# Patient Record
Sex: MALE | Race: WHITE | Employment: OTHER | URBAN - METROPOLITAN AREA
[De-identification: names, ages, dates, MRNs, and addresses within clinical notes are randomized per-mention and may not be internally consistent; named-entity substitution may affect disease eponyms.]

---

## 2018-01-08 PROBLEM — N40.1 BENIGN PROSTATIC HYPERPLASIA WITH WEAK URINARY STREAM: Chronic | Status: ACTIVE | Noted: 2018-01-08

## 2018-01-08 PROBLEM — R39.12 BENIGN PROSTATIC HYPERPLASIA WITH WEAK URINARY STREAM: Chronic | Status: ACTIVE | Noted: 2018-01-08

## 2018-01-08 PROBLEM — K40.90 NON-RECURRENT UNILATERAL INGUINAL HERNIA WITHOUT OBSTRUCTION OR GANGRENE: Chronic | Status: ACTIVE | Noted: 2018-01-08

## 2018-01-09 DIAGNOSIS — Z13.220 SCREENING FOR HYPERLIPIDEMIA: ICD-10-CM

## 2018-01-09 DIAGNOSIS — Z11.59 ENCOUNTER FOR HEPATITIS C SCREENING TEST FOR LOW RISK PATIENT: ICD-10-CM

## 2018-01-09 DIAGNOSIS — Z13.1 SCREENING FOR DIABETES MELLITUS: ICD-10-CM

## 2018-01-09 DIAGNOSIS — Z11.4 SCREENING FOR HIV (HUMAN IMMUNODEFICIENCY VIRUS): ICD-10-CM

## 2018-01-09 LAB
CHOLESTEROL, TOTAL: 192 MG/DL (ref 0–199)
GLUCOSE FASTING: 89 MG/DL (ref 74–109)
HDLC SERPL-MCNC: 39 MG/DL (ref 40–59)
HEPATITIS C ANTIBODY INTERPRETATION: NORMAL
LDL CHOLESTEROL CALCULATED: 140 MG/DL (ref 0–129)
TRIGL SERPL-MCNC: 66 MG/DL (ref 0–200)

## 2018-01-11 LAB — HIV-1 AND HIV-2 ANTIBODIES: NEGATIVE

## 2018-01-12 PROBLEM — E78.00 PURE HYPERCHOLESTEROLEMIA: Chronic | Status: ACTIVE | Noted: 2018-01-12

## 2018-01-18 ENCOUNTER — OFFICE VISIT (OUTPATIENT)
Dept: UROLOGY | Age: 58
End: 2018-01-18

## 2018-01-18 VITALS
WEIGHT: 205 LBS | DIASTOLIC BLOOD PRESSURE: 80 MMHG | BODY MASS INDEX: 27.17 KG/M2 | SYSTOLIC BLOOD PRESSURE: 130 MMHG | HEART RATE: 77 BPM | HEIGHT: 73 IN

## 2018-01-18 DIAGNOSIS — N40.1 BPH WITH OBSTRUCTION/LOWER URINARY TRACT SYMPTOMS: Primary | ICD-10-CM

## 2018-01-18 DIAGNOSIS — N40.1 BPH WITH OBSTRUCTION/LOWER URINARY TRACT SYMPTOMS: ICD-10-CM

## 2018-01-18 DIAGNOSIS — N13.8 BPH WITH OBSTRUCTION/LOWER URINARY TRACT SYMPTOMS: ICD-10-CM

## 2018-01-18 DIAGNOSIS — N13.8 BPH WITH OBSTRUCTION/LOWER URINARY TRACT SYMPTOMS: Primary | ICD-10-CM

## 2018-01-18 LAB — PROSTATE SPECIFIC ANTIGEN: 1.64 NG/ML (ref 0–5.4)

## 2018-01-18 PROCEDURE — 99243 OFF/OP CNSLTJ NEW/EST LOW 30: CPT | Performed by: UROLOGY

## 2018-01-18 ASSESSMENT — ENCOUNTER SYMPTOMS
RESPIRATORY NEGATIVE: 1
GASTROINTESTINAL NEGATIVE: 1
ALLERGIC/IMMUNOLOGIC NEGATIVE: 1

## 2018-01-18 NOTE — PROGRESS NOTES
Subjective:      Patient ID: Flaca Smith is a 59 y.o. male. HPI  This is a 60 yo male with no reported PMH sent for consultation by Dr Phuc Macias for voiding problems. He reports having slowly developed a decreased urinary stream with PVD. 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 He has no hematuria or dysuria. He has no urgency or UI. He feels the bladder empties and has no hesitancy. He has no intermittency. He has no prior UTI's and no abd or flank pain. He has no prior kidney stones. He has no prior Gu surgical history. He is not that bothered by his current voiding complaints. He quit cigg smoking in the 1980's. He has no family h/o  malignancies. He has no other complaints. He was unable to void for a U/A today. Past Medical History:   Diagnosis Date    Pure hypercholesterolemia 1/12/2018     History reviewed. No pertinent surgical history. Social History     Social History    Marital status: Single     Spouse name: N/A    Number of children: N/A    Years of education: N/A     Social History Main Topics    Smoking status: Former Smoker     Quit date: 1/8/1988    Smokeless tobacco: Never Used    Alcohol use No    Drug use: No    Sexual activity: Not Asked     Other Topics Concern    None     Social History Narrative    None     Family History   Problem Relation Age of Onset    Cancer Mother     No Known Problems Brother     Other Sister     Cancer Sister     No Known Problems Sister     No Known Problems Brother      Current Outpatient Prescriptions   Medication Sig Dispense Refill    Multiple Vitamins-Minerals (THERAPEUTIC MULTIVITAMIN-MINERALS) tablet Take 1 tablet by mouth daily      Multiple Vitamins-Minerals (EYE VITAMINS PO) Take by mouth      COD LIVER OIL PO Take by mouth       No current facility-administered medications for this visit. Review of patient's allergies indicates no known allergies.   reviewed      Review of 11:49 AM - Marissa Mohr     Component Results     Component Collected Lab   Color, UA 01/08/2018 11:47 AM Unknown   yellow    Clarity, UA 01/08/2018 11:47 AM Unknown   clear    Glucose, UA POC 01/08/2018 11:47 AM Unknown   neg    Bilirubin, UA 01/08/2018 11:47 AM Unknown   neg    Ketones, UA 01/08/2018 11:47 AM Unknown   neg    Spec Grav, UA 01/08/2018 11:47 AM Unknown   1.025    Blood, UA POC 01/08/2018 11:47 AM Unknown   trace    Comment:   non-hemolyzed   pH, UA 01/08/2018 11:47 AM Unknown   5.0    Protein, UA POC 01/08/2018 11:47 AM Unknown   trace    Urobilinogen, UA 01/08/2018 11:47 AM Unknown   neg    Leukocytes, UA 01/08/2018 11:47 AM Unknown   neg    Nitrite, UA 01/08/2018 11:47 AM Unknown   neg    Lab and Collection         Assessment: This is a 60 yo male with BPH by exam and mild LUTs. I recommend a Flow/PVR in follow-up and U/A. I also recommend a PSA. The purpose of which was dicussed today. Will discuss treatment option for his BPH in follow-up. He may decide against any treatment given his symptoms are not that bothersome which is reasonable. Plan:      1. F/U 1-2 weeks for Flow/PVR and PSA and U/A  2.  See Dr Brenda Goldberg on same day for Rt inguinal hernia

## 2018-02-01 ENCOUNTER — OFFICE VISIT (OUTPATIENT)
Dept: UROLOGY | Age: 58
End: 2018-02-01
Payer: COMMERCIAL

## 2018-02-01 ENCOUNTER — OFFICE VISIT (OUTPATIENT)
Dept: SURGERY | Age: 58
End: 2018-02-01
Payer: COMMERCIAL

## 2018-02-01 ENCOUNTER — PREP FOR PROCEDURE (OUTPATIENT)
Dept: SURGERY | Age: 58
End: 2018-02-01

## 2018-02-01 VITALS
HEIGHT: 73 IN | SYSTOLIC BLOOD PRESSURE: 130 MMHG | WEIGHT: 209 LBS | BODY MASS INDEX: 27.7 KG/M2 | DIASTOLIC BLOOD PRESSURE: 84 MMHG | HEART RATE: 69 BPM

## 2018-02-01 VITALS
TEMPERATURE: 97.3 F | WEIGHT: 209 LBS | BODY MASS INDEX: 27.7 KG/M2 | HEIGHT: 73 IN | SYSTOLIC BLOOD PRESSURE: 130 MMHG | DIASTOLIC BLOOD PRESSURE: 84 MMHG

## 2018-02-01 DIAGNOSIS — N40.1 BPH WITH OBSTRUCTION/LOWER URINARY TRACT SYMPTOMS: ICD-10-CM

## 2018-02-01 DIAGNOSIS — R33.9 INCOMPLETE EMPTYING OF BLADDER: Primary | ICD-10-CM

## 2018-02-01 DIAGNOSIS — N13.8 BPH WITH OBSTRUCTION/LOWER URINARY TRACT SYMPTOMS: ICD-10-CM

## 2018-02-01 DIAGNOSIS — K40.90 RIGHT INGUINAL HERNIA: Primary | ICD-10-CM

## 2018-02-01 LAB
BILIRUBIN, POC: NORMAL
BLOOD URINE, POC: NORMAL
CLARITY, POC: CLEAR
COLOR, POC: YELLOW
GLUCOSE URINE, POC: NORMAL
KETONES, POC: NORMAL
LEUKOCYTE EST, POC: NORMAL
NITRITE, POC: NORMAL
PH, POC: 6
PROTEIN, POC: NORMAL
SPECIFIC GRAVITY, POC: <1.005
UROBILINOGEN, POC: 0.2

## 2018-02-01 PROCEDURE — 81003 URINALYSIS AUTO W/O SCOPE: CPT | Performed by: UROLOGY

## 2018-02-01 PROCEDURE — 99214 OFFICE O/P EST MOD 30 MIN: CPT | Performed by: UROLOGY

## 2018-02-01 PROCEDURE — 99203 OFFICE O/P NEW LOW 30 MIN: CPT | Performed by: SURGERY

## 2018-02-01 PROCEDURE — 99999 PR ELECTRO-UROFLOWMETRY, FIRST: CPT | Performed by: UROLOGY

## 2018-02-01 PROCEDURE — 51798 US URINE CAPACITY MEASURE: CPT | Performed by: UROLOGY

## 2018-02-01 RX ORDER — TAMSULOSIN HYDROCHLORIDE 0.4 MG/1
0.4 CAPSULE ORAL DAILY
Qty: 90 CAPSULE | Refills: 3 | Status: SHIPPED | OUTPATIENT
Start: 2018-02-01 | End: 2020-01-31

## 2018-02-01 ASSESSMENT — ENCOUNTER SYMPTOMS
ABDOMINAL PAIN: 0
COLOR CHANGE: 0
ABDOMINAL PAIN: 0
CHEST TIGHTNESS: 0
SHORTNESS OF BREATH: 0
SHORTNESS OF BREATH: 0
CONSTIPATION: 0
EYES NEGATIVE: 1
ABDOMINAL DISTENTION: 0
RHINORRHEA: 0
ABDOMINAL DISTENTION: 0
ALLERGIC/IMMUNOLOGIC NEGATIVE: 1
BLOOD IN STOOL: 0

## 2018-02-01 NOTE — PROGRESS NOTES
allergies indicates no known allergies. No Known Allergies  Current Outpatient Prescriptions   Medication Sig Dispense Refill    Multiple Vitamins-Minerals (THERAPEUTIC MULTIVITAMIN-MINERALS) tablet Take 1 tablet by mouth daily      Multiple Vitamins-Minerals (EYE VITAMINS PO) Take by mouth      COD LIVER OIL PO Take by mouth      tamsulosin (FLOMAX) 0.4 MG capsule Take 1 capsule by mouth daily 90 capsule 3     No current facility-administered medications for this visit. /84   Temp 97.3 °F (36.3 °C) (Temporal)   Ht 6' 1\" (1.854 m)   Wt 209 lb (94.8 kg)   BMI 27.57 kg/m²     Objective:   Physical Exam   Constitutional: He is oriented to person, place, and time. He appears well-developed and well-nourished. No distress. HENT:   Mouth/Throat: Oropharynx is clear and moist.   Eyes: Pupils are equal, round, and reactive to light. Cardiovascular: Normal rate and normal heart sounds. Pulmonary/Chest: Effort normal and breath sounds normal. No respiratory distress. Abdominal: There is no hepatosplenomegaly. There is no tenderness. A hernia is present. Hernia confirmed positive in the right inguinal area (large reducible RIH). Hernia confirmed negative in the left inguinal area. Musculoskeletal:   Normal gait   Neurological: He is alert and oriented to person, place, and time. Skin: No bruising, no lesion and no rash noted. Psychiatric: He has a normal mood and affect. Judgment normal.       Assessment:      Reducible right inguinal hernia      Plan:      Right Inguinal hernia repair    The risks, benefits and indications for repair of the inguinal hernia are reviewed with the patient. The potential risks and complications including but not exclusive to bleeding, infection, nerve damage, testicular damage, chronic pain and recurrence were reviewed. Anticipated convalescence is discussed. The probable use of prosthetic materials/ mesh is reviewed.   All questions are answered and the patient elects to proceed with surgical repair.

## 2018-02-07 ENCOUNTER — TELEPHONE (OUTPATIENT)
Dept: SURGERY | Age: 58
End: 2018-02-07

## 2018-02-20 ENCOUNTER — OFFICE VISIT (OUTPATIENT)
Dept: UROLOGY | Age: 58
End: 2018-02-20
Payer: COMMERCIAL

## 2018-02-20 VITALS
SYSTOLIC BLOOD PRESSURE: 132 MMHG | BODY MASS INDEX: 27.17 KG/M2 | HEART RATE: 74 BPM | HEIGHT: 73 IN | WEIGHT: 205 LBS | DIASTOLIC BLOOD PRESSURE: 86 MMHG

## 2018-02-20 DIAGNOSIS — N13.8 BPH WITH OBSTRUCTION/LOWER URINARY TRACT SYMPTOMS: Primary | ICD-10-CM

## 2018-02-20 DIAGNOSIS — N40.1 BPH WITH OBSTRUCTION/LOWER URINARY TRACT SYMPTOMS: Primary | ICD-10-CM

## 2018-02-20 PROCEDURE — 99212 OFFICE O/P EST SF 10 MIN: CPT | Performed by: UROLOGY

## 2018-02-20 PROCEDURE — 51798 US URINE CAPACITY MEASURE: CPT | Performed by: UROLOGY

## 2018-02-20 PROCEDURE — 51741 ELECTRO-UROFLOWMETRY FIRST: CPT | Performed by: UROLOGY

## 2018-02-20 ASSESSMENT — ENCOUNTER SYMPTOMS
ABDOMINAL PAIN: 0
ABDOMINAL DISTENTION: 0

## 2018-04-13 DIAGNOSIS — E78.00 PURE HYPERCHOLESTEROLEMIA: Chronic | ICD-10-CM

## 2018-04-13 LAB
CHOLESTEROL, FASTING: 180 MG/DL (ref 0–199)
HDLC SERPL-MCNC: 42 MG/DL (ref 40–59)
LDL CHOLESTEROL CALCULATED: 123 MG/DL (ref 0–129)
TRIGLYCERIDE, FASTING: 73 MG/DL (ref 0–200)

## 2018-04-17 ENCOUNTER — OFFICE VISIT (OUTPATIENT)
Dept: FAMILY MEDICINE CLINIC | Age: 58
End: 2018-04-17
Payer: COMMERCIAL

## 2018-04-17 VITALS
SYSTOLIC BLOOD PRESSURE: 128 MMHG | DIASTOLIC BLOOD PRESSURE: 92 MMHG | WEIGHT: 214.38 LBS | BODY MASS INDEX: 28.28 KG/M2 | OXYGEN SATURATION: 96 % | HEART RATE: 85 BPM | TEMPERATURE: 97.8 F

## 2018-04-17 DIAGNOSIS — R03.0 ELEVATED BLOOD PRESSURE READING: Chronic | ICD-10-CM

## 2018-04-17 DIAGNOSIS — E78.00 PURE HYPERCHOLESTEROLEMIA: Primary | Chronic | ICD-10-CM

## 2018-04-17 DIAGNOSIS — B35.4 TINEA CORPORIS: ICD-10-CM

## 2018-04-17 PROCEDURE — 99213 OFFICE O/P EST LOW 20 MIN: CPT | Performed by: FAMILY MEDICINE

## 2018-04-17 ASSESSMENT — PATIENT HEALTH QUESTIONNAIRE - PHQ9
1. LITTLE INTEREST OR PLEASURE IN DOING THINGS: 0
SUM OF ALL RESPONSES TO PHQ9 QUESTIONS 1 & 2: 0
2. FEELING DOWN, DEPRESSED OR HOPELESS: 0
SUM OF ALL RESPONSES TO PHQ QUESTIONS 1-9: 0

## 2018-04-18 PROBLEM — R03.0 ELEVATED BLOOD PRESSURE READING: Chronic | Status: ACTIVE | Noted: 2018-04-18

## 2018-04-18 RX ORDER — CLOTRIMAZOLE AND BETAMETHASONE DIPROPIONATE 10; .64 MG/G; MG/G
CREAM TOPICAL
Qty: 45 G | Refills: 0 | Status: SHIPPED | OUTPATIENT
Start: 2018-04-18 | End: 2020-01-31

## 2020-01-31 ENCOUNTER — OFFICE VISIT (OUTPATIENT)
Dept: FAMILY MEDICINE CLINIC | Age: 60
End: 2020-01-31

## 2020-01-31 VITALS
TEMPERATURE: 97.1 F | OXYGEN SATURATION: 98 % | HEIGHT: 72 IN | SYSTOLIC BLOOD PRESSURE: 146 MMHG | WEIGHT: 209 LBS | HEART RATE: 66 BPM | BODY MASS INDEX: 28.31 KG/M2 | RESPIRATION RATE: 16 BRPM | DIASTOLIC BLOOD PRESSURE: 82 MMHG

## 2020-01-31 PROBLEM — I10 ESSENTIAL HYPERTENSION: Chronic | Status: ACTIVE | Noted: 2018-04-18

## 2020-01-31 PROCEDURE — 99213 OFFICE O/P EST LOW 20 MIN: CPT | Performed by: FAMILY MEDICINE

## 2020-01-31 RX ORDER — LISINOPRIL 20 MG/1
20 TABLET ORAL DAILY
Qty: 90 TABLET | Refills: 4 | Status: SHIPPED | OUTPATIENT
Start: 2020-01-31 | End: 2021-01-01 | Stop reason: SDUPTHER

## 2020-01-31 RX ORDER — LISINOPRIL 20 MG/1
20 TABLET ORAL DAILY
Qty: 90 TABLET | Refills: 4 | Status: SHIPPED | OUTPATIENT
Start: 2020-01-31 | End: 2020-01-31 | Stop reason: SDUPTHER

## 2020-01-31 ASSESSMENT — PATIENT HEALTH QUESTIONNAIRE - PHQ9
SUM OF ALL RESPONSES TO PHQ9 QUESTIONS 1 & 2: 0
1. LITTLE INTEREST OR PLEASURE IN DOING THINGS: 0
2. FEELING DOWN, DEPRESSED OR HOPELESS: 0
SUM OF ALL RESPONSES TO PHQ QUESTIONS 1-9: 0
SUM OF ALL RESPONSES TO PHQ QUESTIONS 1-9: 0
DEPRESSION UNABLE TO ASSESS: PT REFUSES

## 2020-01-31 NOTE — PROGRESS NOTES
Subjective  Adam Sarah, 62 y.o. male presents today with:  Chief Complaint   Patient presents with    Hypertension     Patient present today to check his BP. 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 Just want to make sure his Bp is ok.  Health Maintenance     Patient refuse all. HPI    Patient is here for f/u HTN. 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 Avoids added salt. Tries to eat healthy. Exercises regularly. Has no chest pain, shortness of breath, palpitations or edema. No other questions and or concerns for today's visit      Review of Systems   No fevers, chills, sweats. No unintended weight loss. No abdominal pain, nausea, vomiting, diarrhea, constipation, bloody stools, black tarry stools. No rashes. No swollen glands. Past Medical History:   Diagnosis Date    Pure hypercholesterolemia 2018     History reviewed. No pertinent surgical history.   Social History     Socioeconomic History    Marital status: Single     Spouse name: Not on file    Number of children: Not on file    Years of education: Not on file    Highest education level: Not on file   Occupational History    Not on file   Social Needs    Financial resource strain: Not on file    Food insecurity:     Worry: Not on file     Inability: Not on file    Transportation needs:     Medical: Not on file     Non-medical: Not on file   Tobacco Use    Smoking status: Former Smoker     Packs/day: 1.00     Years: 10.00     Pack years: 10.00     Types: Cigarettes     Start date: 1979     Last attempt to quit: 1988     Years since quittin.0    Smokeless tobacco: Never Used   Substance and Sexual Activity    Alcohol use: No    Drug use: No    Sexual activity: Not on file   Lifestyle    Physical activity:     Days per week: Not on file     Minutes per session: Not on file    Stress: Not on file Relationships    Social connections:     Talks on phone: Not on file     Gets together: Not on file     Attends [employer] service: Not on file     Active member of club or organization: Not on file     Attends meetings of clubs or organizations: Not on file     Relationship status: Not on file    Intimate partner violence:     Fear of current or ex partner: Not on file     Emotionally abused: Not on file     Physically abused: Not on file     Forced sexual activity: Not on file   Other Topics Concern    Not on file   Social History Narrative    Not on file     Family History   Problem Relation Age of Onset    Cancer Mother     No Known Problems Brother     Other Sister     Cancer Sister     No Known Problems Sister     No Known Problems Brother      No Known Allergies  Current Outpatient Medications   Medication Sig Dispense Refill    lisinopril (PRINIVIL;ZESTRIL) 20 MG tablet Take 1 tablet by mouth daily 90 tablet 4     No current facility-administered medications for this visit. PMH, Surgical Hx, Family Hx, and Social Hxreviewed and updated. Health Maintenance reviewed. Objective    Vitals:    01/31/20 0807   BP: (!) 146/82   Site: Left Upper Arm   Position: Sitting   Cuff Size: Medium Adult   Pulse: 66   Resp: 16   Temp: 97.1 °F (36.2 °C)   TempSrc: Tympanic   SpO2: 98%   Weight: 209 lb (94.8 kg)   Height: 6' (1.829 m)        Physical Exam  Constitutional:       Appearance: He is well-developed. HENT:      Head: Normocephalic and atraumatic. Eyes:      General: No scleral icterus. Conjunctiva/sclera: Conjunctivae normal.   Neck:      Musculoskeletal: Normal range of motion and neck supple. Thyroid: No thyromegaly. Vascular: No carotid bruit. Cardiovascular:      Rate and Rhythm: Normal rate and regular rhythm. Heart sounds: Normal heart sounds, S1 normal and S2 normal.   Pulmonary:      Effort: Pulmonary effort is normal.      Breath sounds: Normal breath sounds.  No wheezing or rales. Abdominal:      General: Bowel sounds are normal. There is no distension. Palpations: Abdomen is soft. There is no mass. Tenderness: There is no abdominal tenderness. There is no guarding or rebound. Skin:     General: Skin is warm and dry. Neurological:      Mental Status: He is alert and oriented to person, place, and time. No results found for: LABA1C  No results found for: LABMICR, CREATININE  No results found for: ALT, AST  Lab Results   Component Value Date    CHOL 192 01/09/2018    TRIG 66 01/09/2018    HDL 42 04/13/2018    LDLCALC 123 04/13/2018        Assessment & Plan   Visit Diagnoses and Associated Orders     Essential hypertension    -  Primary    Basic Metabolic Panel [91130 Custom]   - Future Order    Lipid, Fasting [39763 Custom]   - Future Order    lisinopril (PRINIVIL;ZESTRIL) 20 MG tablet [[id number]]               Reviewed with the patient: all disease processes, current clinical status, medications, activities and diet.      Side effects, adverse effects of the medication prescribed today, as well as treatment plan/ rationale and result expectations have been discussed with the patient who expresses understanding and desires to proceed.     Close follow up to evaluate treatment results and for coordination of care. I have reviewed the patient's medical history in detail and updated the computerized patient record. More than 50% of the appointment was spent in face-to-face counseling, education and care coordination.       Orders Placed This Encounter   Procedures    Basic Metabolic Panel     Standing Status:   Future     Standing Expiration Date:   1/31/2021    Lipid, Fasting     Standing Status:   Future     Standing Expiration Date:   1/31/2021     Orders Placed This Encounter   Medications    lisinopril (PRINIVIL;ZESTRIL) 20 MG tablet     Sig: Take 1 tablet by mouth daily     Dispense:  90 tablet     Refill:  4     Medications Discontinued During

## 2020-01-31 NOTE — PATIENT INSTRUCTIONS
are labeled \"unsalted,\" \"sodium-free,\" or \"low-sodium. \" Foods labeled \"reduced-sodium\" and \"light sodium\" may still have too much sodium. ? Flavor your food with garlic, lemon juice, onion, vinegar, herbs, and spices instead of salt. Do not use soy sauce, steak sauce, onion salt, garlic salt, mustard, or ketchup on your food. ? Use less salt (or none) when recipes call for it. You can often use half the salt a recipe calls for without losing flavor. · Be physically active. Get at least 30 minutes of exercise on most days of the week. Walking is a good choice. You also may want to do other activities, such as running, swimming, cycling, or playing tennis or team sports. · Limit alcohol to 2 drinks a day for men and 1 drink a day for women. · Eat plenty of fruits, vegetables, and low-fat dairy products. Eat less saturated and total fats. How is high blood pressure treated? · Your doctor will suggest making lifestyle changes to help your heart. For example, your doctor may ask you to eat healthy foods, quit smoking, lose extra weight, and be more active. · If lifestyle changes don't help enough, your doctor may recommend that you take medicine. · When blood pressure is very high, medicines are needed to lower it. Follow-up care is a key part of your treatment and safety. Be sure to make and go to all appointments, and call your doctor if you are having problems. It's also a good idea to know your test results and keep a list of the medicines you take. Where can you learn more? Go to [email or web address] and sign in to your [employer] account. Enter P501 in the [facility] box to learn more about \"Learning About High Blood Pressure. \"     If you do not have an account, please click on the \"Sign Up Now\" link. Current as of: April 9, 2019  Content Version: 12.3  © 0581-2966 [employer]. Care instructions adapted under license by [facility] ([facility]).  If you have questions

## 2020-03-13 ENCOUNTER — OFFICE VISIT (OUTPATIENT)
Dept: FAMILY MEDICINE CLINIC | Age: 60
End: 2020-03-13

## 2020-03-13 VITALS
SYSTOLIC BLOOD PRESSURE: 128 MMHG | HEIGHT: 72 IN | RESPIRATION RATE: 16 BRPM | BODY MASS INDEX: 28.17 KG/M2 | TEMPERATURE: 96.7 F | WEIGHT: 208 LBS | HEART RATE: 79 BPM | OXYGEN SATURATION: 99 % | DIASTOLIC BLOOD PRESSURE: 62 MMHG

## 2020-03-13 PROCEDURE — 99213 OFFICE O/P EST LOW 20 MIN: CPT | Performed by: FAMILY MEDICINE

## 2020-03-13 NOTE — PROGRESS NOTES
organization: Not on file     Attends meetings of clubs or organizations: Not on file     Relationship status: Not on file    Intimate partner violence     Fear of current or ex partner: Not on file     Emotionally abused: Not on file     Physically abused: Not on file     Forced sexual activity: Not on file   Other Topics Concern    Not on file   Social History Narrative    Not on file     Family History   Problem Relation Age of Onset    Cancer Mother     No Known Problems Brother     Other Sister     Cancer Sister     No Known Problems Sister     No Known Problems Brother      No Known Allergies  Current Outpatient Medications   Medication Sig Dispense Refill    lisinopril (PRINIVIL;ZESTRIL) 20 MG tablet Take 1 tablet by mouth daily 90 tablet 4     No current facility-administered medications for this visit. PMH, Surgical Hx, Family Hx, and Social Hxreviewed and updated. Health Maintenance reviewed. Objective    Vitals:    03/13/20 0750   BP: 128/62   Site: Left Upper Arm   Position: Sitting   Cuff Size: Medium Adult   Pulse: 79   Resp: 16   Temp: 96.7 °F (35.9 °C)   TempSrc: Temporal   SpO2: 99%   Weight: 208 lb (94.3 kg)   Height: 6' (1.829 m)        Physical Exam  Constitutional:       Appearance: He is well-developed. HENT:      Head: Normocephalic and atraumatic. Eyes:      Conjunctiva/sclera: Conjunctivae normal.   Neck:      Musculoskeletal: Normal range of motion and neck supple. Thyroid: No thyromegaly. Vascular: No carotid bruit. Cardiovascular:      Rate and Rhythm: Normal rate and regular rhythm. Heart sounds: S1 normal and S2 normal.   Pulmonary:      Effort: Pulmonary effort is normal.      Breath sounds: No wheezing or rales. Musculoskeletal:      Right lower leg: No edema. Left lower leg: No edema. Skin:     General: Skin is warm and dry. Neurological:      Mental Status: He is alert and oriented to person, place, and time.            Lab Results

## 2021-01-01 ENCOUNTER — TELEPHONE (OUTPATIENT)
Dept: PALLATIVE CARE | Age: 61
End: 2021-01-01

## 2021-01-01 ENCOUNTER — HOSPITAL ENCOUNTER (INPATIENT)
Age: 61
LOS: 2 days | Discharge: HOME | DRG: 134 | End: 2021-12-12
Attending: EMERGENCY MEDICINE | Admitting: INTERNAL MEDICINE
Payer: COMMERCIAL

## 2021-01-01 ENCOUNTER — OFFICE VISIT (OUTPATIENT)
Dept: FAMILY MEDICINE CLINIC | Age: 61
End: 2021-01-01

## 2021-01-01 ENCOUNTER — TELEPHONE (OUTPATIENT)
Dept: CARDIOLOGY CLINIC | Age: 61
End: 2021-01-01

## 2021-01-01 ENCOUNTER — HOSPITAL ENCOUNTER (INPATIENT)
Age: 61
LOS: 1 days | DRG: 139 | End: 2021-12-30
Attending: INTERNAL MEDICINE | Admitting: INTERNAL MEDICINE
Payer: COMMERCIAL

## 2021-01-01 ENCOUNTER — HOSPITAL ENCOUNTER (OUTPATIENT)
Dept: ULTRASOUND IMAGING | Age: 61
Discharge: HOME | End: 2021-11-07
Payer: COMMERCIAL

## 2021-01-01 ENCOUNTER — CARE COORDINATION (OUTPATIENT)
Dept: CARE COORDINATION | Age: 61
End: 2021-01-01

## 2021-01-01 ENCOUNTER — OFFICE VISIT (OUTPATIENT)
Dept: PALLATIVE CARE | Age: 61
End: 2021-01-01
Payer: COMMERCIAL

## 2021-01-01 ENCOUNTER — TELEPHONE (OUTPATIENT)
Dept: FAMILY MEDICINE CLINIC | Age: 61
End: 2021-01-01

## 2021-01-01 ENCOUNTER — HOSPITAL ENCOUNTER (OUTPATIENT)
Dept: CT IMAGING | Age: 61
Discharge: HOME | End: 2021-10-04
Payer: COMMERCIAL

## 2021-01-01 ENCOUNTER — HOSPITAL ENCOUNTER (EMERGENCY)
Age: 61
Discharge: HOME | End: 2021-10-30
Attending: EMERGENCY MEDICINE
Payer: COMMERCIAL

## 2021-01-01 ENCOUNTER — APPOINTMENT (OUTPATIENT)
Dept: ULTRASOUND IMAGING | Age: 61
DRG: 139 | End: 2021-01-01
Payer: COMMERCIAL

## 2021-01-01 ENCOUNTER — TELEPHONE (OUTPATIENT)
Dept: OTHER | Facility: CLINIC | Age: 61
End: 2021-01-01

## 2021-01-01 ENCOUNTER — APPOINTMENT (OUTPATIENT)
Dept: CT IMAGING | Age: 61
DRG: 139 | End: 2021-01-01
Payer: COMMERCIAL

## 2021-01-01 ENCOUNTER — TELEPHONE (OUTPATIENT)
Dept: INTERVENTIONAL RADIOLOGY/VASCULAR | Age: 61
End: 2021-01-01

## 2021-01-01 ENCOUNTER — APPOINTMENT (OUTPATIENT)
Dept: CT IMAGING | Age: 61
End: 2021-01-01
Payer: COMMERCIAL

## 2021-01-01 ENCOUNTER — APPOINTMENT (OUTPATIENT)
Dept: CT IMAGING | Age: 61
DRG: 137 | End: 2021-01-01
Payer: COMMERCIAL

## 2021-01-01 ENCOUNTER — VIRTUAL VISIT (OUTPATIENT)
Dept: PALLATIVE CARE | Age: 61
End: 2021-01-01
Payer: COMMERCIAL

## 2021-01-01 ENCOUNTER — HOSPITAL ENCOUNTER (OUTPATIENT)
Dept: GENERAL RADIOLOGY | Age: 61
Discharge: HOME | End: 2021-10-04
Payer: COMMERCIAL

## 2021-01-01 ENCOUNTER — PREP FOR PROCEDURE (OUTPATIENT)
Dept: INTERVENTIONAL RADIOLOGY/VASCULAR | Age: 61
End: 2021-01-01

## 2021-01-01 ENCOUNTER — HOSPITAL ENCOUNTER (EMERGENCY)
Age: 61
Discharge: HOME | End: 2021-12-16
Attending: EMERGENCY MEDICINE
Payer: COMMERCIAL

## 2021-01-01 ENCOUNTER — VIRTUAL VISIT (OUTPATIENT)
Dept: INTERVENTIONAL RADIOLOGY/VASCULAR | Age: 61
End: 2021-01-01
Payer: COMMERCIAL

## 2021-01-01 ENCOUNTER — HOSPITAL ENCOUNTER (INPATIENT)
Age: 61
LOS: 2 days | Discharge: HOME | DRG: 137 | End: 2021-11-18
Attending: INTERNAL MEDICINE | Admitting: INTERNAL MEDICINE
Payer: COMMERCIAL

## 2021-01-01 ENCOUNTER — APPOINTMENT (OUTPATIENT)
Dept: GENERAL RADIOLOGY | Age: 61
DRG: 139 | End: 2021-01-01
Payer: COMMERCIAL

## 2021-01-01 ENCOUNTER — APPOINTMENT (OUTPATIENT)
Dept: CT IMAGING | Age: 61
DRG: 134 | End: 2021-01-01
Payer: COMMERCIAL

## 2021-01-01 ENCOUNTER — APPOINTMENT (OUTPATIENT)
Dept: ULTRASOUND IMAGING | Age: 61
DRG: 134 | End: 2021-01-01
Payer: COMMERCIAL

## 2021-01-01 ENCOUNTER — HOSPITAL ENCOUNTER (EMERGENCY)
Age: 61
Discharge: HOME | End: 2021-11-05
Payer: COMMERCIAL

## 2021-01-01 VITALS
DIASTOLIC BLOOD PRESSURE: 84 MMHG | WEIGHT: 150 LBS | TEMPERATURE: 98 F | OXYGEN SATURATION: 92 % | BODY MASS INDEX: 21.47 KG/M2 | SYSTOLIC BLOOD PRESSURE: 125 MMHG | RESPIRATION RATE: 19 BRPM | HEART RATE: 125 BPM | HEIGHT: 70 IN

## 2021-01-01 VITALS
SYSTOLIC BLOOD PRESSURE: 91 MMHG | HEART RATE: 134 BPM | TEMPERATURE: 98.2 F | DIASTOLIC BLOOD PRESSURE: 68 MMHG | RESPIRATION RATE: 32 BRPM | OXYGEN SATURATION: 94 % | WEIGHT: 150 LBS | BODY MASS INDEX: 22.73 KG/M2 | HEIGHT: 68 IN

## 2021-01-01 VITALS
HEIGHT: 70 IN | WEIGHT: 170 LBS | DIASTOLIC BLOOD PRESSURE: 79 MMHG | OXYGEN SATURATION: 98 % | SYSTOLIC BLOOD PRESSURE: 117 MMHG | HEART RATE: 97 BPM | TEMPERATURE: 98.2 F | BODY MASS INDEX: 24.34 KG/M2 | RESPIRATION RATE: 18 BRPM

## 2021-01-01 VITALS
OXYGEN SATURATION: 94 % | BODY MASS INDEX: 22.03 KG/M2 | HEIGHT: 70 IN | WEIGHT: 153.88 LBS | DIASTOLIC BLOOD PRESSURE: 89 MMHG | SYSTOLIC BLOOD PRESSURE: 133 MMHG | RESPIRATION RATE: 20 BRPM | TEMPERATURE: 97.3 F | HEART RATE: 118 BPM

## 2021-01-01 VITALS
DIASTOLIC BLOOD PRESSURE: 66 MMHG | SYSTOLIC BLOOD PRESSURE: 109 MMHG | TEMPERATURE: 98.5 F | RESPIRATION RATE: 18 BRPM | OXYGEN SATURATION: 96 % | HEART RATE: 94 BPM

## 2021-01-01 VITALS
HEART RATE: 106 BPM | TEMPERATURE: 97.5 F | SYSTOLIC BLOOD PRESSURE: 119 MMHG | OXYGEN SATURATION: 96 % | RESPIRATION RATE: 18 BRPM | HEIGHT: 70 IN | WEIGHT: 171 LBS | BODY MASS INDEX: 24.48 KG/M2 | DIASTOLIC BLOOD PRESSURE: 73 MMHG

## 2021-01-01 VITALS
TEMPERATURE: 97.2 F | HEART RATE: 97 BPM | WEIGHT: 188.6 LBS | DIASTOLIC BLOOD PRESSURE: 80 MMHG | OXYGEN SATURATION: 96 % | BODY MASS INDEX: 25.55 KG/M2 | HEIGHT: 72 IN | RESPIRATION RATE: 16 BRPM | SYSTOLIC BLOOD PRESSURE: 112 MMHG

## 2021-01-01 VITALS
HEART RATE: 86 BPM | DIASTOLIC BLOOD PRESSURE: 78 MMHG | OXYGEN SATURATION: 98 % | RESPIRATION RATE: 20 BRPM | SYSTOLIC BLOOD PRESSURE: 114 MMHG

## 2021-01-01 VITALS — SYSTOLIC BLOOD PRESSURE: 144 MMHG | RESPIRATION RATE: 16 BRPM | DIASTOLIC BLOOD PRESSURE: 85 MMHG | HEART RATE: 118 BPM

## 2021-01-01 DIAGNOSIS — G89.3 CANCER RELATED PAIN: ICD-10-CM

## 2021-01-01 DIAGNOSIS — R53.1 WEAKNESS: ICD-10-CM

## 2021-01-01 DIAGNOSIS — G89.29 CHRONIC BILATERAL LOW BACK PAIN WITHOUT SCIATICA: Chronic | ICD-10-CM

## 2021-01-01 DIAGNOSIS — I10 ESSENTIAL HYPERTENSION: Chronic | ICD-10-CM

## 2021-01-01 DIAGNOSIS — K81.0 ACUTE CHOLECYSTITIS: ICD-10-CM

## 2021-01-01 DIAGNOSIS — Z51.5 ENCOUNTER FOR PALLIATIVE CARE: ICD-10-CM

## 2021-01-01 DIAGNOSIS — R53.1 WEAKNESS: Primary | ICD-10-CM

## 2021-01-01 DIAGNOSIS — G47.00 INSOMNIA, UNSPECIFIED TYPE: ICD-10-CM

## 2021-01-01 DIAGNOSIS — I26.94 MULTIPLE SUBSEGMENTAL PULMONARY EMBOLI WITHOUT ACUTE COR PULMONALE (HCC): ICD-10-CM

## 2021-01-01 DIAGNOSIS — M89.9 BONE LESION: ICD-10-CM

## 2021-01-01 DIAGNOSIS — E87.20 LACTIC ACIDOSIS: ICD-10-CM

## 2021-01-01 DIAGNOSIS — R53.81 DEBILITY: ICD-10-CM

## 2021-01-01 DIAGNOSIS — I46.9 CARDIOPULMONARY ARREST (HCC): ICD-10-CM

## 2021-01-01 DIAGNOSIS — R93.5 ABNORMAL ABDOMINAL CT SCAN: ICD-10-CM

## 2021-01-01 DIAGNOSIS — K76.9 LESION OF LIVER: ICD-10-CM

## 2021-01-01 DIAGNOSIS — Z12.5 SCREENING FOR PROSTATE CANCER: ICD-10-CM

## 2021-01-01 DIAGNOSIS — R10.0 ACUTE ABDOMEN: ICD-10-CM

## 2021-01-01 DIAGNOSIS — K40.90 NON-RECURRENT UNILATERAL INGUINAL HERNIA WITHOUT OBSTRUCTION OR GANGRENE: Chronic | ICD-10-CM

## 2021-01-01 DIAGNOSIS — R68.89 SUSPECTED MALIGNANT NEOPLASM: ICD-10-CM

## 2021-01-01 DIAGNOSIS — R63.0 ANOREXIA: ICD-10-CM

## 2021-01-01 DIAGNOSIS — M89.8X5 LYTIC BONE LESION OF HIP: ICD-10-CM

## 2021-01-01 DIAGNOSIS — K76.9 LIVER LESION: ICD-10-CM

## 2021-01-01 DIAGNOSIS — J18.9 PNEUMONIA DUE TO INFECTIOUS ORGANISM, UNSPECIFIED LATERALITY, UNSPECIFIED PART OF LUNG: ICD-10-CM

## 2021-01-01 DIAGNOSIS — M48.54XA PATHOLOGIC COMPRESSION FRACTURE OF THORACIC VERTEBRA, INITIAL ENCOUNTER (HCC): ICD-10-CM

## 2021-01-01 DIAGNOSIS — G89.29 OTHER CHRONIC PAIN: Primary | ICD-10-CM

## 2021-01-01 DIAGNOSIS — K40.91 UNILATERAL RECURRENT INGUINAL HERNIA WITHOUT OBSTRUCTION OR GANGRENE: Primary | ICD-10-CM

## 2021-01-01 DIAGNOSIS — J90 PLEURAL EFFUSION: Primary | ICD-10-CM

## 2021-01-01 DIAGNOSIS — U07.1 COVID-19: Primary | ICD-10-CM

## 2021-01-01 DIAGNOSIS — M54.50 CHRONIC BILATERAL LOW BACK PAIN WITHOUT SCIATICA: Chronic | ICD-10-CM

## 2021-01-01 DIAGNOSIS — G89.29 OTHER CHRONIC PAIN: ICD-10-CM

## 2021-01-01 DIAGNOSIS — R63.4 UNINTENTIONAL WEIGHT LOSS: Chronic | ICD-10-CM

## 2021-01-01 DIAGNOSIS — K76.9 LIVER LESION: Primary | ICD-10-CM

## 2021-01-01 DIAGNOSIS — E78.00 PURE HYPERCHOLESTEROLEMIA: Chronic | ICD-10-CM

## 2021-01-01 DIAGNOSIS — R00.0 TACHYCARDIA: Primary | ICD-10-CM

## 2021-01-01 DIAGNOSIS — R63.4 UNINTENTIONAL WEIGHT LOSS: ICD-10-CM

## 2021-01-01 DIAGNOSIS — M79.89 PAIN AND SWELLING OF RIGHT LOWER LEG: ICD-10-CM

## 2021-01-01 DIAGNOSIS — K72.90 LIVER FAILURE WITHOUT HEPATIC COMA, UNSPECIFIED CHRONICITY (HCC): ICD-10-CM

## 2021-01-01 DIAGNOSIS — M79.89 LEG SWELLING: ICD-10-CM

## 2021-01-01 DIAGNOSIS — Z12.11 SCREEN FOR COLON CANCER: ICD-10-CM

## 2021-01-01 DIAGNOSIS — I27.82 CHRONIC PULMONARY EMBOLISM WITH ACUTE COR PULMONALE, UNSPECIFIED PULMONARY EMBOLISM TYPE (HCC): ICD-10-CM

## 2021-01-01 DIAGNOSIS — A41.9 SEPTICEMIA (HCC): ICD-10-CM

## 2021-01-01 DIAGNOSIS — J90 CHRONIC BILATERAL PLEURAL EFFUSIONS: ICD-10-CM

## 2021-01-01 DIAGNOSIS — R63.4 UNINTENTIONAL WEIGHT LOSS: Primary | ICD-10-CM

## 2021-01-01 DIAGNOSIS — F41.9 ANXIETY: Primary | ICD-10-CM

## 2021-01-01 DIAGNOSIS — I26.09 CHRONIC PULMONARY EMBOLISM WITH ACUTE COR PULMONALE, UNSPECIFIED PULMONARY EMBOLISM TYPE (HCC): ICD-10-CM

## 2021-01-01 DIAGNOSIS — I26.02 ACUTE SADDLE PULMONARY EMBOLISM WITH ACUTE COR PULMONALE (HCC): ICD-10-CM

## 2021-01-01 DIAGNOSIS — I82.411 ACUTE DEEP VEIN THROMBOSIS (DVT) OF FEMORAL VEIN OF RIGHT LOWER EXTREMITY (HCC): Primary | ICD-10-CM

## 2021-01-01 DIAGNOSIS — R73.09 ABNORMAL GLUCOSE: ICD-10-CM

## 2021-01-01 DIAGNOSIS — R52 PAIN: ICD-10-CM

## 2021-01-01 DIAGNOSIS — I10 ESSENTIAL HYPERTENSION: Primary | ICD-10-CM

## 2021-01-01 DIAGNOSIS — J96.21 ACUTE ON CHRONIC RESPIRATORY FAILURE WITH HYPOXIA (HCC): Primary | ICD-10-CM

## 2021-01-01 DIAGNOSIS — M79.661 PAIN AND SWELLING OF RIGHT LOWER LEG: ICD-10-CM

## 2021-01-01 LAB
ALBUMIN SERPL-MCNC: 2.1 G/DL (ref 3.5–4.6)
ALBUMIN SERPL-MCNC: 2.1 G/DL (ref 3.5–4.6)
ALBUMIN SERPL-MCNC: 2.2 G/DL (ref 3.5–4.6)
ALBUMIN SERPL-MCNC: 2.3 G/DL (ref 3.5–4.6)
ALBUMIN SERPL-MCNC: 2.4 G/DL (ref 3.5–4.6)
ALBUMIN SERPL-MCNC: 2.4 G/DL (ref 3.5–4.6)
ALBUMIN SERPL-MCNC: 2.7 G/DL (ref 3.5–4.6)
ALBUMIN SERPL-MCNC: 2.7 G/DL (ref 3.5–4.6)
ALBUMIN SERPL-MCNC: 3 G/DL (ref 3.5–4.6)
ALBUMIN SERPL-MCNC: 3.4 G/DL (ref 3.5–4.6)
ALP BLD-CCNC: 1126 U/L (ref 35–104)
ALP BLD-CCNC: 1184 U/L (ref 35–104)
ALP BLD-CCNC: 1190 U/L (ref 35–104)
ALP BLD-CCNC: 1402 U/L (ref 35–104)
ALP BLD-CCNC: 1461 U/L (ref 35–104)
ALP BLD-CCNC: 1558 U/L (ref 35–104)
ALP BLD-CCNC: 1909 U/L (ref 35–104)
ALP BLD-CCNC: 3409 U/L (ref 35–104)
ALP BLD-CCNC: 3435 U/L (ref 35–104)
ALP BLD-CCNC: 3643 U/L (ref 35–104)
ALT SERPL-CCNC: 35 U/L (ref 0–41)
ALT SERPL-CCNC: 36 U/L (ref 0–41)
ALT SERPL-CCNC: 47 U/L (ref 0–41)
ALT SERPL-CCNC: 52 U/L (ref 0–41)
ALT SERPL-CCNC: 53 U/L (ref 0–41)
ALT SERPL-CCNC: 57 U/L (ref 0–41)
ALT SERPL-CCNC: 61 U/L (ref 0–41)
ALT SERPL-CCNC: 63 U/L (ref 0–41)
ALT SERPL-CCNC: 64 U/L (ref 0–41)
ALT SERPL-CCNC: 77 U/L (ref 0–41)
ANION GAP SERPL CALCULATED.3IONS-SCNC: 11 MEQ/L (ref 9–15)
ANION GAP SERPL CALCULATED.3IONS-SCNC: 12 MEQ/L (ref 9–15)
ANION GAP SERPL CALCULATED.3IONS-SCNC: 13 MEQ/L (ref 9–15)
ANION GAP SERPL CALCULATED.3IONS-SCNC: 13 MEQ/L (ref 9–15)
ANION GAP SERPL CALCULATED.3IONS-SCNC: 15 MEQ/L (ref 9–15)
ANION GAP SERPL CALCULATED.3IONS-SCNC: 15 MEQ/L (ref 9–15)
ANION GAP SERPL CALCULATED.3IONS-SCNC: 16 MEQ/L (ref 9–15)
ANION GAP SERPL CALCULATED.3IONS-SCNC: 18 MEQ/L (ref 9–15)
ANISOCYTOSIS: ABNORMAL
ANTI-XA UNFRAC HEPARIN: 0.13 IU/ML
ANTI-XA UNFRAC HEPARIN: 0.33 IU/ML
ANTI-XA UNFRAC HEPARIN: 0.36 IU/ML
ANTI-XA UNFRAC HEPARIN: 0.47 IU/ML
ANTICARDIOLIPIN IGG ANTIBODY: <10 GPL
APTT: 29.5 SEC (ref 24.4–36.8)
APTT: 35 SEC (ref 24.4–36.8)
APTT: 37.4 SEC (ref 24.4–36.8)
APTT: 37.8 SEC (ref 24.4–36.8)
APTT: 38.5 SEC (ref 24.4–36.8)
AST SERPL-CCNC: 106 U/L (ref 0–40)
AST SERPL-CCNC: 129 U/L (ref 0–40)
AST SERPL-CCNC: 42 U/L (ref 0–40)
AST SERPL-CCNC: 50 U/L (ref 0–40)
AST SERPL-CCNC: 50 U/L (ref 0–40)
AST SERPL-CCNC: 53 U/L (ref 0–40)
AST SERPL-CCNC: 54 U/L (ref 0–40)
AST SERPL-CCNC: 64 U/L (ref 0–40)
AST SERPL-CCNC: 82 U/L (ref 0–40)
AST SERPL-CCNC: 91 U/L (ref 0–40)
ATYPICAL LYMPHOCYTE RELATIVE PERCENT: 1 %
BANDED NEUTROPHILS RELATIVE PERCENT: 2 %
BANDED NEUTROPHILS RELATIVE PERCENT: 7 %
BASE EXCESS ARTERIAL: -1 (ref -3–3)
BASOPHILS ABSOLUTE: 0 K/UL (ref 0–0.2)
BASOPHILS ABSOLUTE: 0.1 K/UL (ref 0–0.2)
BASOPHILS RELATIVE PERCENT: 0.2 %
BASOPHILS RELATIVE PERCENT: 0.3 %
BASOPHILS RELATIVE PERCENT: 0.3 %
BASOPHILS RELATIVE PERCENT: 0.4 %
BASOPHILS RELATIVE PERCENT: 0.4 %
BASOPHILS RELATIVE PERCENT: 0.5 %
BASOPHILS RELATIVE PERCENT: 0.5 %
BASOPHILS RELATIVE PERCENT: 0.7 %
BASOPHILS RELATIVE PERCENT: 0.7 %
BASOPHILS RELATIVE PERCENT: 1 %
BETA-2 GLYCOPROTEIN 1 IGA ANTIBODY: <10 SAU
BETA-2 GLYCOPROTEIN 1 IGG ANTIBODY: <10 SGU
BETA-2 GLYCOPROTEIN 1 IGM ANTIBODY: <10 SMU
BILIRUB SERPL-MCNC: 0.3 MG/DL (ref 0.2–0.7)
BILIRUB SERPL-MCNC: 0.4 MG/DL (ref 0.2–0.7)
BILIRUB SERPL-MCNC: 0.5 MG/DL (ref 0.2–0.7)
BILIRUB SERPL-MCNC: 0.5 MG/DL (ref 0.2–0.7)
BILIRUB SERPL-MCNC: 0.6 MG/DL (ref 0.2–0.7)
BILIRUB SERPL-MCNC: 0.9 MG/DL (ref 0.2–0.7)
BILIRUB SERPL-MCNC: 1.3 MG/DL (ref 0.2–0.7)
BILIRUB SERPL-MCNC: 1.6 MG/DL (ref 0.2–0.7)
BILIRUBIN URINE: NEGATIVE
BILIRUBIN URINE: NEGATIVE
BLOOD CULTURE, ROUTINE: NORMAL
BLOOD, URINE: NEGATIVE
BLOOD, URINE: NEGATIVE
BUN BLDV-MCNC: 13 MG/DL (ref 8–23)
BUN BLDV-MCNC: 13 MG/DL (ref 8–23)
BUN BLDV-MCNC: 16 MG/DL (ref 8–23)
BUN BLDV-MCNC: 17 MG/DL (ref 8–23)
BUN BLDV-MCNC: 19 MG/DL (ref 8–23)
BUN BLDV-MCNC: 20 MG/DL (ref 8–23)
BUN BLDV-MCNC: 21 MG/DL (ref 8–23)
BUN BLDV-MCNC: 26 MG/DL (ref 8–23)
BUN BLDV-MCNC: 28 MG/DL (ref 8–23)
BUN BLDV-MCNC: 29 MG/DL (ref 8–23)
CA 19-9: 14 U/ML (ref 0–35)
CALCIUM IONIZED: 1.21 MMOL/L (ref 1.12–1.32)
CALCIUM SERPL-MCNC: 7.9 MG/DL (ref 8.5–9.9)
CALCIUM SERPL-MCNC: 8.1 MG/DL (ref 8.5–9.9)
CALCIUM SERPL-MCNC: 8.2 MG/DL (ref 8.5–9.9)
CALCIUM SERPL-MCNC: 8.3 MG/DL (ref 8.5–9.9)
CALCIUM SERPL-MCNC: 8.3 MG/DL (ref 8.5–9.9)
CALCIUM SERPL-MCNC: 8.4 MG/DL (ref 8.5–9.9)
CALCIUM SERPL-MCNC: 8.4 MG/DL (ref 8.5–9.9)
CALCIUM SERPL-MCNC: 8.5 MG/DL (ref 8.5–9.9)
CALCIUM SERPL-MCNC: 8.6 MG/DL (ref 8.5–9.9)
CALCIUM SERPL-MCNC: 9.2 MG/DL (ref 8.5–9.9)
CARDIOLIPIN AB IGM: <10 MPL
CEA: 8596 NG/ML (ref 0–5.5)
CHLORIDE BLD-SCNC: 100 MEQ/L (ref 95–107)
CHLORIDE BLD-SCNC: 101 MEQ/L (ref 95–107)
CHLORIDE BLD-SCNC: 101 MEQ/L (ref 95–107)
CHLORIDE BLD-SCNC: 102 MEQ/L (ref 95–107)
CHLORIDE BLD-SCNC: 107 MEQ/L (ref 95–107)
CHLORIDE BLD-SCNC: 108 MEQ/L (ref 95–107)
CHLORIDE BLD-SCNC: 97 MEQ/L (ref 95–107)
CHLORIDE BLD-SCNC: 98 MEQ/L (ref 95–107)
CLARITY: CLEAR
CLARITY: CLEAR
CO2: 19 MEQ/L (ref 20–31)
CO2: 19 MEQ/L (ref 20–31)
CO2: 21 MEQ/L (ref 20–31)
CO2: 21 MEQ/L (ref 20–31)
CO2: 22 MEQ/L (ref 20–31)
CO2: 22 MEQ/L (ref 20–31)
CO2: 23 MEQ/L (ref 20–31)
CO2: 24 MEQ/L (ref 20–31)
CO2: 24 MEQ/L (ref 20–31)
CO2: 26 MEQ/L (ref 20–31)
COLOR: YELLOW
COLOR: YELLOW
CREAT SERPL-MCNC: 0.32 MG/DL (ref 0.7–1.2)
CREAT SERPL-MCNC: 0.36 MG/DL (ref 0.7–1.2)
CREAT SERPL-MCNC: 0.37 MG/DL (ref 0.7–1.2)
CREAT SERPL-MCNC: 0.4 MG/DL (ref 0.7–1.2)
CREAT SERPL-MCNC: 0.4 MG/DL (ref 0.7–1.2)
CREAT SERPL-MCNC: 0.41 MG/DL (ref 0.7–1.2)
CREAT SERPL-MCNC: 0.42 MG/DL (ref 0.7–1.2)
CREAT SERPL-MCNC: 0.44 MG/DL (ref 0.7–1.2)
CREAT SERPL-MCNC: 0.55 MG/DL (ref 0.7–1.2)
CREAT SERPL-MCNC: 0.65 MG/DL (ref 0.7–1.2)
CULTURE, BLOOD 2: NORMAL
DRVVT CONFIRMATION TEST: ABNORMAL RATIO
DRVVT SCREEN: 37 SEC (ref 33–44)
DRVVT,DIL: ABNORMAL SEC (ref 33–44)
EKG ATRIAL RATE: 109 BPM
EKG ATRIAL RATE: 118 BPM
EKG ATRIAL RATE: 122 BPM
EKG ATRIAL RATE: 137 BPM
EKG P AXIS: 37 DEGREES
EKG P AXIS: 47 DEGREES
EKG P AXIS: 48 DEGREES
EKG P AXIS: 63 DEGREES
EKG P-R INTERVAL: 130 MS
EKG P-R INTERVAL: 136 MS
EKG P-R INTERVAL: 138 MS
EKG P-R INTERVAL: 142 MS
EKG Q-T INTERVAL: 280 MS
EKG Q-T INTERVAL: 310 MS
EKG Q-T INTERVAL: 320 MS
EKG Q-T INTERVAL: 320 MS
EKG QRS DURATION: 84 MS
EKG QRS DURATION: 84 MS
EKG QRS DURATION: 86 MS
EKG QRS DURATION: 86 MS
EKG QTC CALCULATION (BAZETT): 422 MS
EKG QTC CALCULATION (BAZETT): 430 MS
EKG QTC CALCULATION (BAZETT): 434 MS
EKG QTC CALCULATION (BAZETT): 456 MS
EKG R AXIS: -6 DEGREES
EKG R AXIS: 37 DEGREES
EKG R AXIS: 6 DEGREES
EKG R AXIS: 8 DEGREES
EKG T AXIS: 21 DEGREES
EKG T AXIS: 28 DEGREES
EKG T AXIS: 33 DEGREES
EKG T AXIS: 43 DEGREES
EKG VENTRICULAR RATE: 109 BPM
EKG VENTRICULAR RATE: 118 BPM
EKG VENTRICULAR RATE: 122 BPM
EKG VENTRICULAR RATE: 137 BPM
EOSINOPHILS ABSOLUTE: 0 K/UL (ref 0–0.7)
EOSINOPHILS ABSOLUTE: 0.1 K/UL (ref 0–0.7)
EOSINOPHILS RELATIVE PERCENT: 0.1 %
EOSINOPHILS RELATIVE PERCENT: 0.2 %
EOSINOPHILS RELATIVE PERCENT: 0.6 %
EOSINOPHILS RELATIVE PERCENT: 0.7 %
EOSINOPHILS RELATIVE PERCENT: 0.7 %
EOSINOPHILS RELATIVE PERCENT: 0.9 %
EOSINOPHILS RELATIVE PERCENT: 1 %
EOSINOPHILS RELATIVE PERCENT: 1.2 %
FERRITIN: 2260 NG/ML (ref 30–400)
FIBRINOGEN: 298 MG/DL (ref 235–507)
GFR AFRICAN AMERICAN: >60
GFR NON-AFRICAN AMERICAN: >60
GLOBULIN: 2.7 G/DL (ref 2.3–3.5)
GLOBULIN: 2.7 G/DL (ref 2.3–3.5)
GLOBULIN: 2.8 G/DL (ref 2.3–3.5)
GLOBULIN: 2.8 G/DL (ref 2.3–3.5)
GLOBULIN: 3 G/DL (ref 2.3–3.5)
GLOBULIN: 3.2 G/DL (ref 2.3–3.5)
GLOBULIN: 3.3 G/DL (ref 2.3–3.5)
GLUCOSE BLD-MCNC: 104 MG/DL (ref 70–99)
GLUCOSE BLD-MCNC: 106 MG/DL (ref 70–99)
GLUCOSE BLD-MCNC: 107 MG/DL (ref 60–115)
GLUCOSE BLD-MCNC: 113 MG/DL (ref 70–99)
GLUCOSE BLD-MCNC: 118 MG/DL (ref 70–99)
GLUCOSE BLD-MCNC: 131 MG/DL (ref 70–99)
GLUCOSE BLD-MCNC: 132 MG/DL (ref 70–99)
GLUCOSE BLD-MCNC: 73 MG/DL (ref 70–99)
GLUCOSE BLD-MCNC: 79 MG/DL (ref 70–99)
GLUCOSE BLD-MCNC: 85 MG/DL (ref 70–99)
GLUCOSE BLD-MCNC: 89 MG/DL (ref 70–99)
GLUCOSE URINE: NEGATIVE MG/DL
GLUCOSE URINE: NEGATIVE MG/DL
HBA1C MFR BLD: 5.4 %
HCO3 ARTERIAL: 22.8 MMOL/L (ref 21–29)
HCT VFR BLD CALC: 32.6 % (ref 42–52)
HCT VFR BLD CALC: 32.9 % (ref 42–52)
HCT VFR BLD CALC: 33.3 % (ref 42–52)
HCT VFR BLD CALC: 33.3 % (ref 42–52)
HCT VFR BLD CALC: 33.4 % (ref 42–52)
HCT VFR BLD CALC: 34.1 % (ref 42–52)
HCT VFR BLD CALC: 35 % (ref 42–52)
HCT VFR BLD CALC: 36.3 % (ref 42–52)
HCT VFR BLD CALC: 36.4 % (ref 42–52)
HCT VFR BLD CALC: 36.5 % (ref 42–52)
HCT VFR BLD CALC: 38.5 % (ref 42–52)
HEMOGLOBIN: 10.5 G/DL (ref 14–18)
HEMOGLOBIN: 10.6 G/DL (ref 14–18)
HEMOGLOBIN: 10.8 G/DL (ref 14–18)
HEMOGLOBIN: 10.9 G/DL (ref 14–18)
HEMOGLOBIN: 11.1 G/DL (ref 14–18)
HEMOGLOBIN: 11.6 G/DL (ref 14–18)
HEMOGLOBIN: 11.6 G/DL (ref 14–18)
HEMOGLOBIN: 12.2 G/DL (ref 14–18)
HEMOGLOBIN: 12.4 G/DL (ref 14–18)
HEMOGLOBIN: 13.4 GM/DL (ref 13.5–17.5)
HEXAGONAL PHOSPHOLIPID NEUTRALIZAT TEST: POSITIVE
HOWELL-JOLLY BODIES: ABNORMAL
HYPOCHROMIA: ABNORMAL
INFLUENZA A BY PCR: NEGATIVE
INFLUENZA B BY PCR: NEGATIVE
INR BLD: 1.1
INR BLD: 1.2
INR BLD: 1.5
INR BLD: 1.8
INR BLD: 3.1
KETONES, URINE: 15 MG/DL
KETONES, URINE: NEGATIVE MG/DL
LACTATE DEHYDROGENASE: 965 U/L (ref 135–225)
LACTATE: 6.28 MMOL/L (ref 0.4–2)
LACTIC ACID: 2.4 MMOL/L (ref 0.5–2.2)
LACTIC ACID: 5.5 MMOL/L (ref 0.5–2.2)
LACTIC ACID: 5.9 MMOL/L (ref 0.5–2.2)
LACTIC ACID: 6 MMOL/L (ref 0.5–2.2)
LACTIC ACID: 6.3 MMOL/L (ref 0.5–2.2)
LACTIC ACID: 6.6 MMOL/L (ref 0.5–2.2)
LACTIC ACID: 6.8 MMOL/L (ref 0.5–2.2)
LEUKOCYTE ESTERASE, URINE: NEGATIVE
LEUKOCYTE ESTERASE, URINE: NEGATIVE
LIPASE: 19 U/L (ref 12–95)
LUPUS ANTICOAG INTERP: ABNORMAL
LYMPHOCYTES ABSOLUTE: 0.2 K/UL (ref 1–4.8)
LYMPHOCYTES ABSOLUTE: 0.2 K/UL (ref 1–4.8)
LYMPHOCYTES ABSOLUTE: 0.8 K/UL (ref 1–4.8)
LYMPHOCYTES ABSOLUTE: 1 K/UL (ref 1–4.8)
LYMPHOCYTES ABSOLUTE: 1.1 K/UL (ref 1–4.8)
LYMPHOCYTES ABSOLUTE: 1.2 K/UL (ref 1–4.8)
LYMPHOCYTES ABSOLUTE: 1.3 K/UL (ref 1–4.8)
LYMPHOCYTES ABSOLUTE: 1.4 K/UL (ref 1–4.8)
LYMPHOCYTES ABSOLUTE: 1.4 K/UL (ref 1–4.8)
LYMPHOCYTES ABSOLUTE: 1.6 K/UL (ref 1–4.8)
LYMPHOCYTES RELATIVE PERCENT: 1 %
LYMPHOCYTES RELATIVE PERCENT: 11.2 %
LYMPHOCYTES RELATIVE PERCENT: 12.8 %
LYMPHOCYTES RELATIVE PERCENT: 13.4 %
LYMPHOCYTES RELATIVE PERCENT: 13.7 %
LYMPHOCYTES RELATIVE PERCENT: 13.7 %
LYMPHOCYTES RELATIVE PERCENT: 14 %
LYMPHOCYTES RELATIVE PERCENT: 14.3 %
LYMPHOCYTES RELATIVE PERCENT: 2 %
LYMPHOCYTES RELATIVE PERCENT: 9.1 %
MAGNESIUM: 1.9 MG/DL (ref 1.7–2.4)
MAGNESIUM: 2 MG/DL (ref 1.7–2.4)
MAGNESIUM: 2.1 MG/DL (ref 1.7–2.4)
MAGNESIUM: 2.4 MG/DL (ref 1.7–2.4)
MAGNESIUM: 2.5 MG/DL (ref 1.7–2.4)
MCH RBC QN AUTO: 27.9 PG (ref 27–31.3)
MCH RBC QN AUTO: 28.2 PG (ref 27–31.3)
MCH RBC QN AUTO: 28.4 PG (ref 27–31.3)
MCH RBC QN AUTO: 28.4 PG (ref 27–31.3)
MCH RBC QN AUTO: 28.5 PG (ref 27–31.3)
MCH RBC QN AUTO: 28.5 PG (ref 27–31.3)
MCH RBC QN AUTO: 28.7 PG (ref 27–31.3)
MCH RBC QN AUTO: 28.7 PG (ref 27–31.3)
MCH RBC QN AUTO: 28.8 PG (ref 27–31.3)
MCH RBC QN AUTO: 28.9 PG (ref 27–31.3)
MCH RBC QN AUTO: 29.3 PG (ref 27–31.3)
MCHC RBC AUTO-ENTMCNC: 31.5 % (ref 33–37)
MCHC RBC AUTO-ENTMCNC: 31.6 % (ref 33–37)
MCHC RBC AUTO-ENTMCNC: 31.8 % (ref 33–37)
MCHC RBC AUTO-ENTMCNC: 31.9 % (ref 33–37)
MCHC RBC AUTO-ENTMCNC: 32 % (ref 33–37)
MCHC RBC AUTO-ENTMCNC: 32 % (ref 33–37)
MCHC RBC AUTO-ENTMCNC: 32.3 % (ref 33–37)
MCHC RBC AUTO-ENTMCNC: 32.7 % (ref 33–37)
MCHC RBC AUTO-ENTMCNC: 34.1 % (ref 33–37)
MCV RBC AUTO: 85.8 FL (ref 80–100)
MCV RBC AUTO: 87.6 FL (ref 80–100)
MCV RBC AUTO: 88 FL (ref 80–100)
MCV RBC AUTO: 88.1 FL (ref 80–100)
MCV RBC AUTO: 89.2 FL (ref 80–100)
MCV RBC AUTO: 89.3 FL (ref 80–100)
MCV RBC AUTO: 89.3 FL (ref 80–100)
MCV RBC AUTO: 89.7 FL (ref 80–100)
MCV RBC AUTO: 89.9 FL (ref 80–100)
MCV RBC AUTO: 90.5 FL (ref 80–100)
MCV RBC AUTO: 90.7 FL (ref 80–100)
METAMYELOCYTES RELATIVE PERCENT: 1 %
MICROCYTES: ABNORMAL
MONOCYTES ABSOLUTE: 0.1 K/UL (ref 0.2–0.8)
MONOCYTES ABSOLUTE: 0.3 K/UL (ref 0.2–0.8)
MONOCYTES ABSOLUTE: 0.5 K/UL (ref 0.2–0.8)
MONOCYTES ABSOLUTE: 0.6 K/UL (ref 0.2–0.8)
MONOCYTES ABSOLUTE: 0.9 K/UL (ref 0.2–0.8)
MONOCYTES RELATIVE PERCENT: 0.9 %
MONOCYTES RELATIVE PERCENT: 2.5 %
MONOCYTES RELATIVE PERCENT: 3.9 %
MONOCYTES RELATIVE PERCENT: 4.7 %
MONOCYTES RELATIVE PERCENT: 5.1 %
MONOCYTES RELATIVE PERCENT: 6.4 %
MONOCYTES RELATIVE PERCENT: 6.4 %
MONOCYTES RELATIVE PERCENT: 6.9 %
MONOCYTES RELATIVE PERCENT: 7.4 %
MONOCYTES RELATIVE PERCENT: 8.6 %
MYELOCYTE PERCENT: 3 %
NEUTROPHILS ABSOLUTE: 10.2 K/UL (ref 1.4–6.5)
NEUTROPHILS ABSOLUTE: 10.7 K/UL (ref 1.4–6.5)
NEUTROPHILS ABSOLUTE: 5.7 K/UL (ref 1.4–6.5)
NEUTROPHILS ABSOLUTE: 6.3 K/UL (ref 1.4–6.5)
NEUTROPHILS ABSOLUTE: 7.7 K/UL (ref 1.4–6.5)
NEUTROPHILS ABSOLUTE: 7.8 K/UL (ref 1.4–6.5)
NEUTROPHILS ABSOLUTE: 7.8 K/UL (ref 1.4–6.5)
NEUTROPHILS ABSOLUTE: 8.4 K/UL (ref 1.4–6.5)
NEUTROPHILS ABSOLUTE: 8.9 K/UL (ref 1.4–6.5)
NEUTROPHILS ABSOLUTE: 8.9 K/UL (ref 1.4–6.5)
NEUTROPHILS RELATIVE PERCENT: 77 %
NEUTROPHILS RELATIVE PERCENT: 77.2 %
NEUTROPHILS RELATIVE PERCENT: 78 %
NEUTROPHILS RELATIVE PERCENT: 78.4 %
NEUTROPHILS RELATIVE PERCENT: 78.8 %
NEUTROPHILS RELATIVE PERCENT: 81.2 %
NEUTROPHILS RELATIVE PERCENT: 82 %
NEUTROPHILS RELATIVE PERCENT: 84.1 %
NEUTROPHILS RELATIVE PERCENT: 91 %
NEUTROPHILS RELATIVE PERCENT: 92 %
NITRITE, URINE: NEGATIVE
NITRITE, URINE: NEGATIVE
NUCLEATED RED BLOOD CELLS: 1 /100 WBC
O2 SAT, ARTERIAL: 94 % (ref 93–100)
PCO2 ARTERIAL: 32 MM HG (ref 35–45)
PDW BLD-RTO: 16 % (ref 11.5–14.5)
PDW BLD-RTO: 17 % (ref 11.5–14.5)
PDW BLD-RTO: 17.4 % (ref 11.5–14.5)
PDW BLD-RTO: 17.5 % (ref 11.5–14.5)
PDW BLD-RTO: 18.3 % (ref 11.5–14.5)
PDW BLD-RTO: 18.3 % (ref 11.5–14.5)
PDW BLD-RTO: 18.5 % (ref 11.5–14.5)
PDW BLD-RTO: 18.7 % (ref 11.5–14.5)
PDW BLD-RTO: 18.8 % (ref 11.5–14.5)
PERFORMED ON: ABNORMAL
PH ARTERIAL: 7.47 (ref 7.35–7.45)
PH UA: 5.5 (ref 5–9)
PH UA: 7 (ref 5–9)
PLATELET # BLD: 171 K/UL (ref 130–400)
PLATELET # BLD: 206 K/UL (ref 130–400)
PLATELET # BLD: 207 K/UL (ref 130–400)
PLATELET # BLD: 228 K/UL (ref 130–400)
PLATELET # BLD: 234 K/UL (ref 130–400)
PLATELET # BLD: 35 K/UL (ref 130–400)
PLATELET # BLD: 350 K/UL (ref 130–400)
PLATELET # BLD: 358 K/UL (ref 130–400)
PLATELET # BLD: 362 K/UL (ref 130–400)
PLATELET # BLD: 386 K/UL (ref 130–400)
PLATELET # BLD: 53 K/UL (ref 130–400)
PLATELET SLIDE REVIEW: ABNORMAL
PLATELET SLIDE REVIEW: ABNORMAL
PLATELET SLIDE REVIEW: ADEQUATE
PLATELET SLIDE REVIEW: NORMAL
PLT NEUTA: NEGATIVE
PO2 ARTERIAL: 64 MM HG (ref 75–108)
POC CHLORIDE: 104 MEQ/L (ref 99–110)
POC CREATININE WHOLE BLOOD: 0.4
POC CREATININE WHOLE BLOOD: 0.4
POC CREATININE: 0.4 MG/DL (ref 0.8–1.3)
POC CREATININE: 0.4 MG/DL (ref 0.8–1.3)
POC CREATININE: 0.5 MG/DL (ref 0.8–1.3)
POC CREATININE: 0.5 MG/DL (ref 0.8–1.3)
POC CREATININE: 0.6 MG/DL (ref 0.8–1.3)
POC FIO2: 5
POC HEMATOCRIT: 39 % (ref 41–53)
POC POTASSIUM: 4.5 MEQ/L (ref 3.5–5.1)
POC SAMPLE TYPE: ABNORMAL
POC SODIUM: 141 MEQ/L (ref 136–145)
POIKILOCYTES: ABNORMAL
POIKILOCYTES: ABNORMAL
POLYCHROMASIA: ABNORMAL
POTASSIUM REFLEX MAGNESIUM: 3.9 MEQ/L (ref 3.4–4.9)
POTASSIUM REFLEX MAGNESIUM: 3.9 MEQ/L (ref 3.4–4.9)
POTASSIUM SERPL-SCNC: 3.5 MEQ/L (ref 3.4–4.9)
POTASSIUM SERPL-SCNC: 3.7 MEQ/L (ref 3.4–4.9)
POTASSIUM SERPL-SCNC: 3.7 MEQ/L (ref 3.4–4.9)
POTASSIUM SERPL-SCNC: 4.1 MEQ/L (ref 3.4–4.9)
POTASSIUM SERPL-SCNC: 4.1 MEQ/L (ref 3.4–4.9)
POTASSIUM SERPL-SCNC: 4.5 MEQ/L (ref 3.4–4.9)
POTASSIUM SERPL-SCNC: 4.6 MEQ/L (ref 3.4–4.9)
POTASSIUM SERPL-SCNC: 4.7 MEQ/L (ref 3.4–4.9)
PRO-BNP: 112 PG/ML
PRO-BNP: 214 PG/ML
PRO-BNP: 420 PG/ML
PROCALCITONIN: 0.11 NG/ML (ref 0–0.15)
PROCALCITONIN: 0.35 NG/ML (ref 0–0.15)
PROCALCITONIN: 0.49 NG/ML (ref 0–0.15)
PROLYMPHOCYTES: 1 %
PROTEIN UA: NEGATIVE MG/DL
PROTEIN UA: NEGATIVE MG/DL
PROTHROMBIN TIME: 14.4 SEC (ref 12.3–14.9)
PROTHROMBIN TIME: 14.8 SEC (ref 12.3–14.9)
PROTHROMBIN TIME: 17.8 SEC (ref 12.3–14.9)
PROTHROMBIN TIME: 20.8 SEC (ref 12.3–14.9)
PROTHROMBIN TIME: 31.4 SEC (ref 12.3–14.9)
PT D: 15.6 SEC (ref 12–15.5)
PTT D: 56 SEC (ref 32–48)
PTT-D CORR REFLEX: 49 SEC (ref 32–48)
PTT-HEPARIN NEUTRALIZED: ABNORMAL SEC (ref 32–48)
RBC # BLD: 3.69 M/UL (ref 4.7–6.1)
RBC # BLD: 3.7 M/UL (ref 4.7–6.1)
RBC # BLD: 3.71 M/UL (ref 4.7–6.1)
RBC # BLD: 3.79 M/UL (ref 4.7–6.1)
RBC # BLD: 3.81 M/UL (ref 4.7–6.1)
RBC # BLD: 3.82 M/UL (ref 4.7–6.1)
RBC # BLD: 3.9 M/UL (ref 4.7–6.1)
RBC # BLD: 4.01 M/UL (ref 4.7–6.1)
RBC # BLD: 4.04 M/UL (ref 4.7–6.1)
RBC # BLD: 4.24 M/UL (ref 4.7–6.1)
RBC # BLD: 4.31 M/UL (ref 4.7–6.1)
REPTILASE TIME: ABNORMAL SEC
SARS-COV-2, NAAT: DETECTED
SARS-COV-2, NAAT: NOT DETECTED
SARS-COV-2, NAAT: NOT DETECTED
SLIDE REVIEW: ABNORMAL
SMUDGE CELLS: 2.8
SMUDGE CELLS: 5.9
SODIUM BLD-SCNC: 131 MEQ/L (ref 135–144)
SODIUM BLD-SCNC: 137 MEQ/L (ref 135–144)
SODIUM BLD-SCNC: 137 MEQ/L (ref 135–144)
SODIUM BLD-SCNC: 139 MEQ/L (ref 135–144)
SODIUM BLD-SCNC: 141 MEQ/L (ref 135–144)
SPECIFIC GRAVITY UA: 1.08 (ref 1–1.03)
SPECIFIC GRAVITY UA: 1.09 (ref 1–1.03)
TCO2 ARTERIAL: 24 (ref 22–29)
TEAR DROP CELLS: ABNORMAL
TEAR DROP CELLS: ABNORMAL
THROMBIN TIME: 19.5 SEC (ref 14.7–19.5)
TOTAL PROTEIN: 5 G/DL (ref 6.3–8)
TOTAL PROTEIN: 5.2 G/DL (ref 6.3–8)
TOTAL PROTEIN: 5.2 G/DL (ref 6.3–8)
TOTAL PROTEIN: 5.4 G/DL (ref 6.3–8)
TOTAL PROTEIN: 5.4 G/DL (ref 6.3–8)
TOTAL PROTEIN: 5.5 G/DL (ref 6.3–8)
TOTAL PROTEIN: 5.7 G/DL (ref 6.3–8)
TOTAL PROTEIN: 6 G/DL (ref 6.3–8)
TOTAL PROTEIN: 6.1 G/DL (ref 6.3–8)
TOTAL PROTEIN: 6.2 G/DL (ref 6.3–8)
TROPONIN: <0.01 NG/ML (ref 0–0.01)
TSH SERPL DL<=0.05 MIU/L-ACNC: 2.6 UIU/ML (ref 0.44–3.86)
URINE REFLEX TO CULTURE: ABNORMAL
UROBILINOGEN, URINE: 1 E.U./DL
UROBILINOGEN, URINE: 1 E.U./DL
WBC # BLD: 10 K/UL (ref 4.8–10.8)
WBC # BLD: 10.3 K/UL (ref 4.8–10.8)
WBC # BLD: 10.4 K/UL (ref 4.8–10.8)
WBC # BLD: 10.9 K/UL (ref 4.8–10.8)
WBC # BLD: 10.9 K/UL (ref 4.8–10.8)
WBC # BLD: 11.2 K/UL (ref 4.8–10.8)
WBC # BLD: 11.4 K/UL (ref 4.8–10.8)
WBC # BLD: 7.3 K/UL (ref 4.8–10.8)
WBC # BLD: 8.2 K/UL (ref 4.8–10.8)
WBC # BLD: 9.3 K/UL (ref 4.8–10.8)
WBC # BLD: 9.9 K/UL (ref 4.8–10.8)

## 2021-01-01 PROCEDURE — 85613 RUSSELL VIPER VENOM DILUTED: CPT

## 2021-01-01 PROCEDURE — 80053 COMPREHEN METABOLIC PANEL: CPT

## 2021-01-01 PROCEDURE — 96375 TX/PRO/DX INJ NEW DRUG ADDON: CPT

## 2021-01-01 PROCEDURE — 83735 ASSAY OF MAGNESIUM: CPT

## 2021-01-01 PROCEDURE — 6360000002 HC RX W HCPCS: Performed by: INTERNAL MEDICINE

## 2021-01-01 PROCEDURE — 71275 CT ANGIOGRAPHY CHEST: CPT

## 2021-01-01 PROCEDURE — 96374 THER/PROPH/DIAG INJ IV PUSH: CPT

## 2021-01-01 PROCEDURE — 82803 BLOOD GASES ANY COMBINATION: CPT

## 2021-01-01 PROCEDURE — 99283 EMERGENCY DEPT VISIT LOW MDM: CPT

## 2021-01-01 PROCEDURE — 96365 THER/PROPH/DIAG IV INF INIT: CPT

## 2021-01-01 PROCEDURE — 83880 ASSAY OF NATRIURETIC PEPTIDE: CPT

## 2021-01-01 PROCEDURE — 97166 OT EVAL MOD COMPLEX 45 MIN: CPT

## 2021-01-01 PROCEDURE — 6360000004 HC RX CONTRAST MEDICATION: Performed by: EMERGENCY MEDICINE

## 2021-01-01 PROCEDURE — 99214 OFFICE O/P EST MOD 30 MIN: CPT | Performed by: NURSE PRACTITIONER

## 2021-01-01 PROCEDURE — 36415 COLL VENOUS BLD VENIPUNCTURE: CPT

## 2021-01-01 PROCEDURE — 85610 PROTHROMBIN TIME: CPT

## 2021-01-01 PROCEDURE — XW033E5 INTRODUCTION OF REMDESIVIR ANTI-INFECTIVE INTO PERIPHERAL VEIN, PERCUTANEOUS APPROACH, NEW TECHNOLOGY GROUP 5: ICD-10-PCS | Performed by: INTERNAL MEDICINE

## 2021-01-01 PROCEDURE — 81003 URINALYSIS AUTO W/O SCOPE: CPT

## 2021-01-01 PROCEDURE — 85730 THROMBOPLASTIN TIME PARTIAL: CPT

## 2021-01-01 PROCEDURE — 87635 SARS-COV-2 COVID-19 AMP PRB: CPT

## 2021-01-01 PROCEDURE — 6360000002 HC RX W HCPCS: Performed by: NURSE PRACTITIONER

## 2021-01-01 PROCEDURE — 87040 BLOOD CULTURE FOR BACTERIA: CPT

## 2021-01-01 PROCEDURE — 6360000002 HC RX W HCPCS: Performed by: EMERGENCY MEDICINE

## 2021-01-01 PROCEDURE — 85597 PHOSPHOLIPID PLTLT NEUTRALIZ: CPT

## 2021-01-01 PROCEDURE — 85598 HEXAGNAL PHOSPH PLTLT NEUTRL: CPT

## 2021-01-01 PROCEDURE — 2580000003 HC RX 258: Performed by: NURSE PRACTITIONER

## 2021-01-01 PROCEDURE — 85025 COMPLETE CBC W/AUTO DIFF WBC: CPT

## 2021-01-01 PROCEDURE — 6360000004 HC RX CONTRAST MEDICATION: Performed by: PHYSICIAN ASSISTANT

## 2021-01-01 PROCEDURE — 97162 PT EVAL MOD COMPLEX 30 MIN: CPT

## 2021-01-01 PROCEDURE — 6360000004 HC RX CONTRAST MEDICATION: Performed by: GENERAL PRACTICE

## 2021-01-01 PROCEDURE — 83605 ASSAY OF LACTIC ACID: CPT

## 2021-01-01 PROCEDURE — 1111F DSCHRG MED/CURRENT MED MERGE: CPT | Performed by: FAMILY MEDICINE

## 2021-01-01 PROCEDURE — G8420 CALC BMI NORM PARAMETERS: HCPCS | Performed by: FAMILY MEDICINE

## 2021-01-01 PROCEDURE — G8484 FLU IMMUNIZE NO ADMIN: HCPCS | Performed by: FAMILY MEDICINE

## 2021-01-01 PROCEDURE — 85520 HEPARIN ASSAY: CPT

## 2021-01-01 PROCEDURE — 93005 ELECTROCARDIOGRAM TRACING: CPT | Performed by: EMERGENCY MEDICINE

## 2021-01-01 PROCEDURE — 84295 ASSAY OF SERUM SODIUM: CPT

## 2021-01-01 PROCEDURE — 84145 PROCALCITONIN (PCT): CPT

## 2021-01-01 PROCEDURE — 73030 X-RAY EXAM OF SHOULDER: CPT

## 2021-01-01 PROCEDURE — 86147 CARDIOLIPIN ANTIBODY EA IG: CPT

## 2021-01-01 PROCEDURE — 87081 CULTURE SCREEN ONLY: CPT

## 2021-01-01 PROCEDURE — 6360000004 HC RX CONTRAST MEDICATION: Performed by: NURSE PRACTITIONER

## 2021-01-01 PROCEDURE — 2580000003 HC RX 258: Performed by: INTERNAL MEDICINE

## 2021-01-01 PROCEDURE — 74177 CT ABD & PELVIS W/CONTRAST: CPT

## 2021-01-01 PROCEDURE — 82330 ASSAY OF CALCIUM: CPT

## 2021-01-01 PROCEDURE — 2580000003 HC RX 258

## 2021-01-01 PROCEDURE — 6370000000 HC RX 637 (ALT 250 FOR IP): Performed by: INTERNAL MEDICINE

## 2021-01-01 PROCEDURE — 2700000000 HC OXYGEN THERAPY PER DAY

## 2021-01-01 PROCEDURE — 99232 SBSQ HOSP IP/OBS MODERATE 35: CPT | Performed by: INTERNAL MEDICINE

## 2021-01-01 PROCEDURE — 6370000000 HC RX 637 (ALT 250 FOR IP): Performed by: STUDENT IN AN ORGANIZED HEALTH CARE EDUCATION/TRAINING PROGRAM

## 2021-01-01 PROCEDURE — 2580000003 HC RX 258: Performed by: STUDENT IN AN ORGANIZED HEALTH CARE EDUCATION/TRAINING PROGRAM

## 2021-01-01 PROCEDURE — 81002 URINALYSIS NONAUTO W/O SCOPE: CPT | Performed by: FAMILY MEDICINE

## 2021-01-01 PROCEDURE — 76705 ECHO EXAM OF ABDOMEN: CPT

## 2021-01-01 PROCEDURE — 71045 X-RAY EXAM CHEST 1 VIEW: CPT

## 2021-01-01 PROCEDURE — 93005 ELECTROCARDIOGRAM TRACING: CPT

## 2021-01-01 PROCEDURE — 84484 ASSAY OF TROPONIN QUANT: CPT

## 2021-01-01 PROCEDURE — 6360000002 HC RX W HCPCS: Performed by: STUDENT IN AN ORGANIZED HEALTH CARE EDUCATION/TRAINING PROGRAM

## 2021-01-01 PROCEDURE — 2060000000 HC ICU INTERMEDIATE R&B

## 2021-01-01 PROCEDURE — 85027 COMPLETE CBC AUTOMATED: CPT

## 2021-01-01 PROCEDURE — 93926 LOWER EXTREMITY STUDY: CPT

## 2021-01-01 PROCEDURE — 2500000003 HC RX 250 WO HCPCS: Performed by: GENERAL PRACTICE

## 2021-01-01 PROCEDURE — 94761 N-INVAS EAR/PLS OXIMETRY MLT: CPT

## 2021-01-01 PROCEDURE — 2500000003 HC RX 250 WO HCPCS

## 2021-01-01 PROCEDURE — 93320 DOPPLER ECHO COMPLETE: CPT

## 2021-01-01 PROCEDURE — 99255 IP/OBS CONSLTJ NEW/EST HI 80: CPT | Performed by: INTERNAL MEDICINE

## 2021-01-01 PROCEDURE — 99223 1ST HOSP IP/OBS HIGH 75: CPT | Performed by: INTERNAL MEDICINE

## 2021-01-01 PROCEDURE — 99215 OFFICE O/P EST HI 40 MIN: CPT | Performed by: FAMILY MEDICINE

## 2021-01-01 PROCEDURE — 86301 IMMUNOASSAY TUMOR CA 19-9: CPT

## 2021-01-01 PROCEDURE — 85670 THROMBIN TIME PLASMA: CPT

## 2021-01-01 PROCEDURE — 93005 ELECTROCARDIOGRAM TRACING: CPT | Performed by: NURSE PRACTITIONER

## 2021-01-01 PROCEDURE — 92950 HEART/LUNG RESUSCITATION CPR: CPT

## 2021-01-01 PROCEDURE — 1210000000 HC MED SURG R&B

## 2021-01-01 PROCEDURE — 93308 TTE F-UP OR LMTD: CPT

## 2021-01-01 PROCEDURE — G8428 CUR MEDS NOT DOCUMENT: HCPCS | Performed by: FAMILY MEDICINE

## 2021-01-01 PROCEDURE — 82435 ASSAY OF BLOOD CHLORIDE: CPT

## 2021-01-01 PROCEDURE — 82728 ASSAY OF FERRITIN: CPT

## 2021-01-01 PROCEDURE — 85384 FIBRINOGEN ACTIVITY: CPT

## 2021-01-01 PROCEDURE — 83690 ASSAY OF LIPASE: CPT

## 2021-01-01 PROCEDURE — G8420 CALC BMI NORM PARAMETERS: HCPCS | Performed by: NURSE PRACTITIONER

## 2021-01-01 PROCEDURE — 82378 CARCINOEMBRYONIC ANTIGEN: CPT

## 2021-01-01 PROCEDURE — 6360000002 HC RX W HCPCS

## 2021-01-01 PROCEDURE — 93010 ELECTROCARDIOGRAM REPORT: CPT | Performed by: INTERNAL MEDICINE

## 2021-01-01 PROCEDURE — 6360000004 HC RX CONTRAST MEDICATION

## 2021-01-01 PROCEDURE — 87502 INFLUENZA DNA AMP PROBE: CPT

## 2021-01-01 PROCEDURE — 6370000000 HC RX 637 (ALT 250 FOR IP): Performed by: NURSE PRACTITIONER

## 2021-01-01 PROCEDURE — 99285 EMERGENCY DEPT VISIT HI MDM: CPT

## 2021-01-01 PROCEDURE — 99214 OFFICE O/P EST MOD 30 MIN: CPT | Performed by: FAMILY MEDICINE

## 2021-01-01 PROCEDURE — 83615 LACTATE (LD) (LDH) ENZYME: CPT

## 2021-01-01 PROCEDURE — 93970 EXTREMITY STUDY: CPT

## 2021-01-01 PROCEDURE — 85732 THROMBOPLASTIN TIME PARTIAL: CPT

## 2021-01-01 PROCEDURE — G8427 DOCREV CUR MEDS BY ELIG CLIN: HCPCS | Performed by: FAMILY MEDICINE

## 2021-01-01 PROCEDURE — 84132 ASSAY OF SERUM POTASSIUM: CPT

## 2021-01-01 PROCEDURE — 85014 HEMATOCRIT: CPT

## 2021-01-01 PROCEDURE — 1036F TOBACCO NON-USER: CPT | Performed by: FAMILY MEDICINE

## 2021-01-01 PROCEDURE — 6360000002 HC RX W HCPCS: Performed by: PHYSICIAN ASSISTANT

## 2021-01-01 PROCEDURE — 3017F COLORECTAL CA SCREEN DOC REV: CPT | Performed by: FAMILY MEDICINE

## 2021-01-01 PROCEDURE — 94664 DEMO&/EVAL PT USE INHALER: CPT

## 2021-01-01 PROCEDURE — 2580000003 HC RX 258: Performed by: PHYSICIAN ASSISTANT

## 2021-01-01 PROCEDURE — 99205 OFFICE O/P NEW HI 60 MIN: CPT | Performed by: FAMILY MEDICINE

## 2021-01-01 PROCEDURE — 84443 ASSAY THYROID STIM HORMONE: CPT

## 2021-01-01 PROCEDURE — APPSS30 APP SPLIT SHARED TIME 16-30 MINUTES: Performed by: NURSE PRACTITIONER

## 2021-01-01 PROCEDURE — G8484 FLU IMMUNIZE NO ADMIN: HCPCS | Performed by: NURSE PRACTITIONER

## 2021-01-01 PROCEDURE — 93325 DOPPLER ECHO COLOR FLOW MAPG: CPT

## 2021-01-01 PROCEDURE — 93971 EXTREMITY STUDY: CPT

## 2021-01-01 PROCEDURE — 86146 BETA-2 GLYCOPROTEIN ANTIBODY: CPT

## 2021-01-01 PROCEDURE — 96372 THER/PROPH/DIAG INJ SC/IM: CPT

## 2021-01-01 PROCEDURE — 82565 ASSAY OF CREATININE: CPT

## 2021-01-01 PROCEDURE — G8428 CUR MEDS NOT DOCUMENT: HCPCS | Performed by: NURSE PRACTITIONER

## 2021-01-01 PROCEDURE — 83036 HEMOGLOBIN GLYCOSYLATED A1C: CPT | Performed by: FAMILY MEDICINE

## 2021-01-01 RX ORDER — IPRATROPIUM BROMIDE AND ALBUTEROL SULFATE 2.5; .5 MG/3ML; MG/3ML
1 SOLUTION RESPIRATORY (INHALATION) EVERY 4 HOURS PRN
Status: DISCONTINUED | OUTPATIENT
Start: 2021-01-01 | End: 2021-12-31 | Stop reason: HOSPADM

## 2021-01-01 RX ORDER — ACETAMINOPHEN 325 MG/1
650 TABLET ORAL EVERY 6 HOURS PRN
Status: DISCONTINUED | OUTPATIENT
Start: 2021-01-01 | End: 2021-01-01 | Stop reason: HOSPADM

## 2021-01-01 RX ORDER — SODIUM CHLORIDE 9 MG/ML
25 INJECTION, SOLUTION INTRAVENOUS PRN
Status: DISCONTINUED | OUTPATIENT
Start: 2021-01-01 | End: 2021-01-01 | Stop reason: HOSPADM

## 2021-01-01 RX ORDER — EPINEPHRINE 1 MG/ML
INJECTION, SOLUTION, CONCENTRATE INTRAVENOUS
Status: DISCONTINUED
Start: 2021-01-01 | End: 2021-12-31 | Stop reason: HOSPADM

## 2021-01-01 RX ORDER — TRAZODONE HYDROCHLORIDE 50 MG/1
50 TABLET ORAL NIGHTLY
Qty: 30 TABLET | Refills: 1 | Status: SHIPPED | OUTPATIENT
Start: 2021-01-01

## 2021-01-01 RX ORDER — 0.9 % SODIUM CHLORIDE 0.9 %
1000 INTRAVENOUS SOLUTION INTRAVENOUS ONCE
Status: COMPLETED | OUTPATIENT
Start: 2021-01-01 | End: 2021-01-01

## 2021-01-01 RX ORDER — SODIUM CHLORIDE 0.9 % (FLUSH) 0.9 %
5-40 SYRINGE (ML) INJECTION PRN
Status: DISCONTINUED | OUTPATIENT
Start: 2021-01-01 | End: 2021-01-01 | Stop reason: HOSPADM

## 2021-01-01 RX ORDER — HEPARIN SODIUM 1000 [USP'U]/ML
80 INJECTION, SOLUTION INTRAVENOUS; SUBCUTANEOUS PRN
Status: DISCONTINUED | OUTPATIENT
Start: 2021-01-01 | End: 2021-01-01 | Stop reason: ALTCHOICE

## 2021-01-01 RX ORDER — VITAMIN B COMPLEX
2000 TABLET ORAL DAILY
Status: DISCONTINUED | OUTPATIENT
Start: 2021-01-01 | End: 2021-01-01 | Stop reason: HOSPADM

## 2021-01-01 RX ORDER — OXYCODONE HYDROCHLORIDE AND ACETAMINOPHEN 5; 325 MG/1; MG/1
.5-1 TABLET ORAL EVERY 8 HOURS PRN
Qty: 90 TABLET | Refills: 0 | Status: SHIPPED | OUTPATIENT
Start: 2021-01-01 | End: 2021-01-01 | Stop reason: SDUPTHER

## 2021-01-01 RX ORDER — OXYCODONE HYDROCHLORIDE AND ACETAMINOPHEN 5; 325 MG/1; MG/1
1 TABLET ORAL EVERY 6 HOURS PRN
Qty: 12 TABLET | Refills: 0 | Status: SHIPPED | OUTPATIENT
Start: 2021-01-01 | End: 2021-01-01

## 2021-01-01 RX ORDER — IBUPROFEN 200 MG
TABLET ORAL ONCE
Status: CANCELLED | OUTPATIENT
Start: 2021-01-01 | End: 2021-01-01

## 2021-01-01 RX ORDER — LIDOCAINE HYDROCHLORIDE 20 MG/ML
10 INJECTION, SOLUTION INFILTRATION; PERINEURAL
Status: CANCELLED | OUTPATIENT
Start: 2021-01-01

## 2021-01-01 RX ORDER — EPINEPHRINE 0.1 MG/ML
SYRINGE (ML) INJECTION
Status: DISCONTINUED
Start: 2021-01-01 | End: 2021-12-31 | Stop reason: HOSPADM

## 2021-01-01 RX ORDER — HEPARIN SODIUM 1000 [USP'U]/ML
40 INJECTION, SOLUTION INTRAVENOUS; SUBCUTANEOUS PRN
Status: DISCONTINUED | OUTPATIENT
Start: 2021-01-01 | End: 2021-01-01 | Stop reason: ALTCHOICE

## 2021-01-01 RX ORDER — ACETAMINOPHEN 650 MG/1
650 SUPPOSITORY RECTAL EVERY 6 HOURS PRN
Status: DISCONTINUED | OUTPATIENT
Start: 2021-01-01 | End: 2021-12-31 | Stop reason: HOSPADM

## 2021-01-01 RX ORDER — METHYLPREDNISOLONE SODIUM SUCCINATE 125 MG/2ML
125 INJECTION, POWDER, LYOPHILIZED, FOR SOLUTION INTRAMUSCULAR; INTRAVENOUS ONCE
Status: COMPLETED | OUTPATIENT
Start: 2021-01-01 | End: 2021-01-01

## 2021-01-01 RX ORDER — SODIUM CHLORIDE 0.9 % (FLUSH) 0.9 %
10 SYRINGE (ML) INJECTION ONCE
Status: COMPLETED | OUTPATIENT
Start: 2021-01-01 | End: 2021-01-01

## 2021-01-01 RX ORDER — SODIUM CHLORIDE 0.9 % (FLUSH) 0.9 %
5-40 SYRINGE (ML) INJECTION EVERY 12 HOURS SCHEDULED
Status: DISCONTINUED | OUTPATIENT
Start: 2021-01-01 | End: 2021-12-31 | Stop reason: HOSPADM

## 2021-01-01 RX ORDER — FUROSEMIDE 40 MG/1
40 TABLET ORAL DAILY
Qty: 7 TABLET | Refills: 0 | Status: SHIPPED | OUTPATIENT
Start: 2021-01-01 | End: 2021-01-01

## 2021-01-01 RX ORDER — ONDANSETRON 4 MG/1
4 TABLET, ORALLY DISINTEGRATING ORAL EVERY 8 HOURS PRN
Status: DISCONTINUED | OUTPATIENT
Start: 2021-01-01 | End: 2021-01-01 | Stop reason: HOSPADM

## 2021-01-01 RX ORDER — POLYETHYLENE GLYCOL 3350 17 G/17G
17 POWDER, FOR SOLUTION ORAL DAILY PRN
Status: DISCONTINUED | OUTPATIENT
Start: 2021-01-01 | End: 2021-01-01 | Stop reason: HOSPADM

## 2021-01-01 RX ORDER — ONDANSETRON 2 MG/ML
4 INJECTION INTRAMUSCULAR; INTRAVENOUS EVERY 6 HOURS PRN
Status: DISCONTINUED | OUTPATIENT
Start: 2021-01-01 | End: 2021-12-31 | Stop reason: HOSPADM

## 2021-01-01 RX ORDER — MAGNESIUM SULFATE IN WATER 40 MG/ML
2000 INJECTION, SOLUTION INTRAVENOUS ONCE
Status: COMPLETED | OUTPATIENT
Start: 2021-01-01 | End: 2021-01-01

## 2021-01-01 RX ORDER — SODIUM CHLORIDE 0.9 % (FLUSH) 0.9 %
5-40 SYRINGE (ML) INJECTION EVERY 12 HOURS SCHEDULED
Status: DISCONTINUED | OUTPATIENT
Start: 2021-01-01 | End: 2021-01-01 | Stop reason: HOSPADM

## 2021-01-01 RX ORDER — ONDANSETRON 2 MG/ML
4 INJECTION INTRAMUSCULAR; INTRAVENOUS EVERY 6 HOURS PRN
Status: DISCONTINUED | OUTPATIENT
Start: 2021-01-01 | End: 2021-01-01 | Stop reason: HOSPADM

## 2021-01-01 RX ORDER — FENTANYL CITRATE 50 UG/ML
50 INJECTION, SOLUTION INTRAMUSCULAR; INTRAVENOUS
Status: CANCELLED | OUTPATIENT
Start: 2021-01-01

## 2021-01-01 RX ORDER — MIDAZOLAM HYDROCHLORIDE 1 MG/ML
0.5 INJECTION INTRAMUSCULAR; INTRAVENOUS
Status: CANCELLED | OUTPATIENT
Start: 2021-01-01

## 2021-01-01 RX ORDER — OXYCODONE HYDROCHLORIDE AND ACETAMINOPHEN 5; 325 MG/1; MG/1
0.5 TABLET ORAL ONCE
Status: COMPLETED | OUTPATIENT
Start: 2021-01-01 | End: 2021-01-01

## 2021-01-01 RX ORDER — FUROSEMIDE 10 MG/ML
40 INJECTION INTRAMUSCULAR; INTRAVENOUS ONCE
Status: COMPLETED | OUTPATIENT
Start: 2021-01-01 | End: 2021-01-01

## 2021-01-01 RX ORDER — SODIUM CHLORIDE 9 MG/ML
INJECTION, SOLUTION INTRAVENOUS CONTINUOUS
Status: DISCONTINUED | OUTPATIENT
Start: 2021-01-01 | End: 2021-01-01 | Stop reason: HOSPADM

## 2021-01-01 RX ORDER — TRAZODONE HYDROCHLORIDE 50 MG/1
50 TABLET ORAL NIGHTLY
Status: DISCONTINUED | OUTPATIENT
Start: 2021-01-01 | End: 2021-12-31 | Stop reason: HOSPADM

## 2021-01-01 RX ORDER — ACETAMINOPHEN 325 MG/1
650 TABLET ORAL EVERY 6 HOURS PRN
Status: DISCONTINUED | OUTPATIENT
Start: 2021-01-01 | End: 2021-12-31 | Stop reason: HOSPADM

## 2021-01-01 RX ORDER — ACETAMINOPHEN 650 MG/1
650 SUPPOSITORY RECTAL EVERY 6 HOURS PRN
Status: DISCONTINUED | OUTPATIENT
Start: 2021-01-01 | End: 2021-01-01 | Stop reason: HOSPADM

## 2021-01-01 RX ORDER — MIRTAZAPINE 15 MG/1
7.5 TABLET, FILM COATED ORAL NIGHTLY
Qty: 45 TABLET | Refills: 2 | Status: SHIPPED | OUTPATIENT
Start: 2021-01-01

## 2021-01-01 RX ORDER — GUAIFENESIN/DEXTROMETHORPHAN 100-10MG/5
5 SYRUP ORAL EVERY 4 HOURS PRN
Status: DISCONTINUED | OUTPATIENT
Start: 2021-01-01 | End: 2021-01-01 | Stop reason: HOSPADM

## 2021-01-01 RX ORDER — ATROPINE SULFATE 0.1 MG/ML
INJECTION INTRAVENOUS
Status: DISCONTINUED
Start: 2021-01-01 | End: 2021-12-31 | Stop reason: HOSPADM

## 2021-01-01 RX ORDER — LISINOPRIL 20 MG/1
20 TABLET ORAL DAILY
Qty: 90 TABLET | Refills: 0 | Status: SHIPPED | OUTPATIENT
Start: 2021-01-01 | End: 2021-01-01

## 2021-01-01 RX ORDER — HEPARIN SODIUM 10000 [USP'U]/100ML
5-30 INJECTION, SOLUTION INTRAVENOUS CONTINUOUS
Status: DISCONTINUED | OUTPATIENT
Start: 2021-01-01 | End: 2021-01-01

## 2021-01-01 RX ORDER — SODIUM CHLORIDE 0.9 % (FLUSH) 0.9 %
5-40 SYRINGE (ML) INJECTION PRN
Status: DISCONTINUED | OUTPATIENT
Start: 2021-01-01 | End: 2021-12-31 | Stop reason: HOSPADM

## 2021-01-01 RX ORDER — KETOROLAC TROMETHAMINE 30 MG/ML
30 INJECTION, SOLUTION INTRAMUSCULAR; INTRAVENOUS ONCE
Status: COMPLETED | OUTPATIENT
Start: 2021-01-01 | End: 2021-01-01

## 2021-01-01 RX ORDER — ESCITALOPRAM OXALATE 10 MG/1
10 TABLET ORAL DAILY
Qty: 30 TABLET | Refills: 3 | Status: SHIPPED | OUTPATIENT
Start: 2021-01-01

## 2021-01-01 RX ORDER — SODIUM CHLORIDE 9 MG/ML
25 INJECTION, SOLUTION INTRAVENOUS PRN
Status: DISCONTINUED | OUTPATIENT
Start: 2021-01-01 | End: 2021-12-31 | Stop reason: HOSPADM

## 2021-01-01 RX ORDER — FUROSEMIDE 10 MG/ML
20 INJECTION INTRAMUSCULAR; INTRAVENOUS ONCE
Status: COMPLETED | OUTPATIENT
Start: 2021-01-01 | End: 2021-01-01

## 2021-01-01 RX ORDER — OXYCODONE HYDROCHLORIDE AND ACETAMINOPHEN 5; 325 MG/1; MG/1
.5-1 TABLET ORAL EVERY 8 HOURS PRN
Qty: 90 TABLET | Refills: 0 | Status: SHIPPED | OUTPATIENT
Start: 2021-01-01 | End: 2022-01-28

## 2021-01-01 RX ORDER — OXYCODONE HYDROCHLORIDE AND ACETAMINOPHEN 5; 325 MG/1; MG/1
1 TABLET ORAL EVERY 8 HOURS PRN
Status: DISCONTINUED | OUTPATIENT
Start: 2021-01-01 | End: 2021-01-01 | Stop reason: HOSPADM

## 2021-01-01 RX ORDER — SODIUM CHLORIDE 0.9 % (FLUSH) 0.9 %
10 SYRINGE (ML) INJECTION 2 TIMES DAILY
Status: DISCONTINUED | OUTPATIENT
Start: 2021-01-01 | End: 2021-01-01 | Stop reason: HOSPADM

## 2021-01-01 RX ORDER — HEPARIN SODIUM 1000 [USP'U]/ML
80 INJECTION, SOLUTION INTRAVENOUS; SUBCUTANEOUS ONCE
Status: COMPLETED | OUTPATIENT
Start: 2021-01-01 | End: 2021-01-01

## 2021-01-01 RX ORDER — 0.9 % SODIUM CHLORIDE 0.9 %
250 INTRAVENOUS SOLUTION INTRAVENOUS
Status: CANCELLED | OUTPATIENT
Start: 2021-01-01

## 2021-01-01 RX ORDER — LORAZEPAM 2 MG/ML
0.5 INJECTION INTRAMUSCULAR ONCE
Status: COMPLETED | OUTPATIENT
Start: 2021-01-01 | End: 2021-01-01

## 2021-01-01 RX ORDER — POLYETHYLENE GLYCOL 3350 17 G/17G
17 POWDER, FOR SOLUTION ORAL DAILY PRN
Status: DISCONTINUED | OUTPATIENT
Start: 2021-01-01 | End: 2021-12-31 | Stop reason: HOSPADM

## 2021-01-01 RX ORDER — ONDANSETRON 4 MG/1
4 TABLET, ORALLY DISINTEGRATING ORAL EVERY 8 HOURS PRN
Status: DISCONTINUED | OUTPATIENT
Start: 2021-01-01 | End: 2021-12-31 | Stop reason: HOSPADM

## 2021-01-01 RX ORDER — DILTIAZEM HYDROCHLORIDE 5 MG/ML
10 INJECTION INTRAVENOUS ONCE
Status: COMPLETED | OUTPATIENT
Start: 2021-01-01 | End: 2021-01-01

## 2021-01-01 RX ORDER — KETOROLAC TROMETHAMINE 15 MG/ML
15 INJECTION, SOLUTION INTRAMUSCULAR; INTRAVENOUS ONCE
Status: DISCONTINUED | OUTPATIENT
Start: 2021-01-01 | End: 2021-01-01 | Stop reason: HOSPADM

## 2021-01-01 RX ORDER — LISINOPRIL 10 MG/1
20 TABLET ORAL DAILY
Status: CANCELLED | OUTPATIENT
Start: 2021-01-01

## 2021-01-01 RX ORDER — ETODOLAC 400 MG/1
400 TABLET, FILM COATED ORAL 2 TIMES DAILY
COMMUNITY
End: 2021-01-01

## 2021-01-01 RX ORDER — ESCITALOPRAM OXALATE 10 MG/1
10 TABLET ORAL DAILY
Status: DISCONTINUED | OUTPATIENT
Start: 2021-01-01 | End: 2021-12-31 | Stop reason: HOSPADM

## 2021-01-01 RX ORDER — HEPARIN SODIUM 10000 [USP'U]/100ML
5-30 INJECTION, SOLUTION INTRAVENOUS CONTINUOUS
Status: ACTIVE | OUTPATIENT
Start: 2021-01-01 | End: 2021-01-01

## 2021-01-01 RX ORDER — OXYCODONE HYDROCHLORIDE AND ACETAMINOPHEN 5; 325 MG/1; MG/1
1 TABLET ORAL EVERY 8 HOURS PRN
Status: DISCONTINUED | OUTPATIENT
Start: 2021-01-01 | End: 2021-12-31 | Stop reason: HOSPADM

## 2021-01-01 RX ORDER — SODIUM CHLORIDE 9 MG/ML
INJECTION, SOLUTION INTRAVENOUS CONTINUOUS
Status: DISCONTINUED | OUTPATIENT
Start: 2021-01-01 | End: 2021-01-01

## 2021-01-01 RX ORDER — SODIUM CHLORIDE 0.9 % (FLUSH) 0.9 %
10 SYRINGE (ML) INJECTION
Status: DISPENSED | OUTPATIENT
Start: 2021-01-01 | End: 2021-01-01

## 2021-01-01 RX ADMIN — KETOROLAC TROMETHAMINE 30 MG: 30 INJECTION, SOLUTION INTRAMUSCULAR at 15:00

## 2021-01-01 RX ADMIN — METHYLPREDNISOLONE SODIUM SUCCINATE 125 MG: 125 INJECTION, POWDER, FOR SOLUTION INTRAMUSCULAR; INTRAVENOUS at 17:04

## 2021-01-01 RX ADMIN — SODIUM CHLORIDE, PRESERVATIVE FREE 10 ML: 5 INJECTION INTRAVENOUS at 22:43

## 2021-01-01 RX ADMIN — MAGNESIUM SULFATE HEPTAHYDRATE 2000 MG: 40 INJECTION, SOLUTION INTRAVENOUS at 17:05

## 2021-01-01 RX ADMIN — HEPARIN SODIUM 22 UNITS/KG/HR: 10000 INJECTION, SOLUTION INTRAVENOUS at 13:36

## 2021-01-01 RX ADMIN — LORAZEPAM 0.5 MG: 2 INJECTION INTRAMUSCULAR; INTRAVENOUS at 17:05

## 2021-01-01 RX ADMIN — IOPAMIDOL 100 ML: 755 INJECTION, SOLUTION INTRAVENOUS at 12:03

## 2021-01-01 RX ADMIN — PIPERACILLIN AND TAZOBACTAM 3375 MG: 3; .375 INJECTION, POWDER, LYOPHILIZED, FOR SOLUTION INTRAVENOUS at 04:30

## 2021-01-01 RX ADMIN — DILTIAZEM HYDROCHLORIDE 10 MG: 5 INJECTION INTRAVENOUS at 17:04

## 2021-01-01 RX ADMIN — SODIUM CHLORIDE, PRESERVATIVE FREE 10 ML: 5 INJECTION INTRAVENOUS at 20:32

## 2021-01-01 RX ADMIN — ENOXAPARIN SODIUM 80 MG: 100 INJECTION SUBCUTANEOUS at 09:32

## 2021-01-01 RX ADMIN — FUROSEMIDE 40 MG: 10 INJECTION INTRAMUSCULAR; INTRAVENOUS at 20:13

## 2021-01-01 RX ADMIN — RIVAROXABAN 20 MG: 20 TABLET, FILM COATED ORAL at 13:26

## 2021-01-01 RX ADMIN — SODIUM CHLORIDE 1000 ML: 9 INJECTION, SOLUTION INTRAVENOUS at 15:00

## 2021-01-01 RX ADMIN — PIPERACILLIN AND TAZOBACTAM 3375 MG: 3; .375 INJECTION, POWDER, LYOPHILIZED, FOR SOLUTION INTRAVENOUS at 21:05

## 2021-01-01 RX ADMIN — ONDANSETRON 4 MG: 2 INJECTION INTRAMUSCULAR; INTRAVENOUS at 11:15

## 2021-01-01 RX ADMIN — FUROSEMIDE 20 MG: 10 INJECTION, SOLUTION INTRAVENOUS at 13:46

## 2021-01-01 RX ADMIN — METOPROLOL 12.5 MG: 25 TABLET ORAL at 08:56

## 2021-01-01 RX ADMIN — OXYCODONE AND ACETAMINOPHEN 0.5 TABLET: 5; 325 TABLET ORAL at 20:45

## 2021-01-01 RX ADMIN — RIVAROXABAN 20 MG: 20 TABLET, FILM COATED ORAL at 10:17

## 2021-01-01 RX ADMIN — SODIUM CHLORIDE, PRESERVATIVE FREE 10 ML: 5 INJECTION INTRAVENOUS at 02:27

## 2021-01-01 RX ADMIN — BARIUM SULFATE 450 ML: 20 SUSPENSION ORAL at 12:04

## 2021-01-01 RX ADMIN — PIPERACILLIN AND TAZOBACTAM 3375 MG: 3; .375 INJECTION, POWDER, LYOPHILIZED, FOR SOLUTION INTRAVENOUS at 13:46

## 2021-01-01 RX ADMIN — Medication 2000 UNITS: at 09:32

## 2021-01-01 RX ADMIN — SODIUM CHLORIDE, PRESERVATIVE FREE 10 ML: 5 INJECTION INTRAVENOUS at 00:18

## 2021-01-01 RX ADMIN — VANCOMYCIN HYDROCHLORIDE 1500 MG: 5 INJECTION, POWDER, LYOPHILIZED, FOR SOLUTION INTRAVENOUS at 10:55

## 2021-01-01 RX ADMIN — HEPARIN SODIUM 18 UNITS/KG/HR: 10000 INJECTION, SOLUTION INTRAVENOUS at 00:17

## 2021-01-01 RX ADMIN — SODIUM CHLORIDE: 9 INJECTION, SOLUTION INTRAVENOUS at 11:40

## 2021-01-01 RX ADMIN — HEPARIN SODIUM 6210 UNITS: 1000 INJECTION, SOLUTION INTRAVENOUS; SUBCUTANEOUS at 00:17

## 2021-01-01 RX ADMIN — IOPAMIDOL 169 ML: 612 INJECTION, SOLUTION INTRAVENOUS at 19:40

## 2021-01-01 RX ADMIN — IOPAMIDOL 100 ML: 755 INJECTION, SOLUTION INTRAVENOUS at 15:36

## 2021-01-01 RX ADMIN — OXYCODONE AND ACETAMINOPHEN 1 TABLET: 5; 325 TABLET ORAL at 08:56

## 2021-01-01 RX ADMIN — SODIUM CHLORIDE 1000 ML: 9 INJECTION, SOLUTION INTRAVENOUS at 17:35

## 2021-01-01 RX ADMIN — IOPAMIDOL 100 ML: 755 INJECTION, SOLUTION INTRAVENOUS at 15:26

## 2021-01-01 RX ADMIN — IOPAMIDOL 100 ML: 612 INJECTION, SOLUTION INTRAVENOUS at 20:00

## 2021-01-01 RX ADMIN — ENOXAPARIN SODIUM 80 MG: 100 INJECTION SUBCUTANEOUS at 20:57

## 2021-01-01 RX ADMIN — SODIUM CHLORIDE, PRESERVATIVE FREE 10 ML: 5 INJECTION INTRAVENOUS at 20:58

## 2021-01-01 RX ADMIN — ESCITALOPRAM OXALATE 10 MG: 10 TABLET ORAL at 08:56

## 2021-01-01 RX ADMIN — SODIUM CHLORIDE: 9 INJECTION, SOLUTION INTRAVENOUS at 18:55

## 2021-01-01 RX ADMIN — HEPARIN SODIUM 6210 UNITS: 1000 INJECTION, SOLUTION INTRAVENOUS; SUBCUTANEOUS at 07:12

## 2021-01-01 RX ADMIN — ENOXAPARIN SODIUM 80 MG: 100 INJECTION SUBCUTANEOUS at 21:22

## 2021-01-01 RX ADMIN — Medication 2000 UNITS: at 10:18

## 2021-01-01 RX ADMIN — IOPAMIDOL 100 ML: 612 INJECTION, SOLUTION INTRAVENOUS at 14:47

## 2021-01-01 RX ADMIN — CEFTRIAXONE SODIUM 1000 MG: 1 INJECTION, POWDER, FOR SOLUTION INTRAMUSCULAR; INTRAVENOUS at 15:00

## 2021-01-01 RX ADMIN — SODIUM CHLORIDE, PRESERVATIVE FREE 10 ML: 5 INJECTION INTRAVENOUS at 21:06

## 2021-01-01 RX ADMIN — SODIUM CHLORIDE: 9 INJECTION, SOLUTION INTRAVENOUS at 02:28

## 2021-01-01 RX ADMIN — HEPARIN SODIUM 22.04 UNITS/KG/HR: 10000 INJECTION, SOLUTION INTRAVENOUS at 05:41

## 2021-01-01 RX ADMIN — OXYCODONE AND ACETAMINOPHEN 0.5 TABLET: 5; 325 TABLET ORAL at 23:29

## 2021-01-01 RX ADMIN — OXYCODONE AND ACETAMINOPHEN 0.5 TABLET: 5; 325 TABLET ORAL at 11:30

## 2021-01-01 RX ADMIN — VANCOMYCIN HYDROCHLORIDE 1500 MG: 5 INJECTION, POWDER, LYOPHILIZED, FOR SOLUTION INTRAVENOUS at 21:56

## 2021-01-01 RX ADMIN — ENOXAPARIN SODIUM 80 MG: 100 INJECTION SUBCUTANEOUS at 10:18

## 2021-01-01 RX ADMIN — SODIUM CHLORIDE 100 ML/HR: 9 INJECTION, SOLUTION INTRAVENOUS at 05:43

## 2021-01-01 RX ADMIN — SODIUM CHLORIDE: 9 INJECTION, SOLUTION INTRAVENOUS at 14:24

## 2021-01-01 SDOH — ECONOMIC STABILITY: FOOD INSECURITY: WITHIN THE PAST 12 MONTHS, THE FOOD YOU BOUGHT JUST DIDN'T LAST AND YOU DIDN'T HAVE MONEY TO GET MORE.: NEVER TRUE

## 2021-01-01 SDOH — ECONOMIC STABILITY: TRANSPORTATION INSECURITY
IN THE PAST 12 MONTHS, HAS THE LACK OF TRANSPORTATION KEPT YOU FROM MEDICAL APPOINTMENTS OR FROM GETTING MEDICATIONS?: NO

## 2021-01-01 SDOH — ECONOMIC STABILITY: FOOD INSECURITY: WITHIN THE PAST 12 MONTHS, YOU WORRIED THAT YOUR FOOD WOULD RUN OUT BEFORE YOU GOT MONEY TO BUY MORE.: NEVER TRUE

## 2021-01-01 SDOH — ECONOMIC STABILITY: TRANSPORTATION INSECURITY
IN THE PAST 12 MONTHS, HAS LACK OF TRANSPORTATION KEPT YOU FROM MEETINGS, WORK, OR FROM GETTING THINGS NEEDED FOR DAILY LIVING?: NO

## 2021-01-01 ASSESSMENT — ENCOUNTER SYMPTOMS
EYE REDNESS: 0
ABDOMINAL DISTENTION: 0
COUGH: 1
PHOTOPHOBIA: 0
DIARRHEA: 0
BACK PAIN: 1
SHORTNESS OF BREATH: 0
EYE DISCHARGE: 0
SORE THROAT: 0
APNEA: 0
COUGH: 0
SHORTNESS OF BREATH: 0
NAUSEA: 0
NAUSEA: 0
VOMITING: 0
DIARRHEA: 0
ABDOMINAL DISTENTION: 0
ABDOMINAL DISTENTION: 0
EYE REDNESS: 0
WHEEZING: 0
RESPIRATORY NEGATIVE: 1
COUGH: 0
SORE THROAT: 0
DIARRHEA: 0
CONSTIPATION: 0
WHEEZING: 0
VOMITING: 0
SHORTNESS OF BREATH: 0
SHORTNESS OF BREATH: 0
ABDOMINAL PAIN: 0
WHEEZING: 0
GASTROINTESTINAL NEGATIVE: 1
CHOKING: 0
RECTAL PAIN: 0
ABDOMINAL PAIN: 0
NAUSEA: 0
VOMITING: 0
EYE ITCHING: 0
ABDOMINAL DISTENTION: 0
WHEEZING: 0
ABDOMINAL PAIN: 1
COUGH: 0
NAUSEA: 0
EYE DISCHARGE: 0
SHORTNESS OF BREATH: 1
COUGH: 0
COUGH: 0
GASTROINTESTINAL NEGATIVE: 1
COUGH: 0
SHORTNESS OF BREATH: 0
EYE PAIN: 0
EYE DISCHARGE: 0
ABDOMINAL DISTENTION: 0
GASTROINTESTINAL NEGATIVE: 1
SHORTNESS OF BREATH: 0
VOMITING: 0
TROUBLE SWALLOWING: 0
FACIAL SWELLING: 0
SORE THROAT: 0
ABDOMINAL PAIN: 0
BACK PAIN: 1
RHINORRHEA: 0
EYES NEGATIVE: 1
ABDOMINAL PAIN: 0
CONSTIPATION: 0
EYES NEGATIVE: 1
ABDOMINAL PAIN: 0
CONSTIPATION: 0
VOMITING: 0
COUGH: 0
TROUBLE SWALLOWING: 0
COUGH: 0
GASTROINTESTINAL NEGATIVE: 1
EYE DISCHARGE: 0
DIARRHEA: 0
RESPIRATORY NEGATIVE: 1
PHOTOPHOBIA: 0
SHORTNESS OF BREATH: 1
ABDOMINAL PAIN: 0
COLOR CHANGE: 0
DIARRHEA: 0
DIARRHEA: 0
BACK PAIN: 0
RESPIRATORY NEGATIVE: 1
CHEST TIGHTNESS: 0
BACK PAIN: 0
RESPIRATORY NEGATIVE: 1
EYES NEGATIVE: 1
FACIAL SWELLING: 0
NAUSEA: 0
RHINORRHEA: 0
CHEST TIGHTNESS: 0
CHEST TIGHTNESS: 0
SORE THROAT: 0
SHORTNESS OF BREATH: 0
STRIDOR: 0
APNEA: 0
STRIDOR: 0
RHINORRHEA: 0
NAUSEA: 0
DIARRHEA: 0
COLOR CHANGE: 0
PHOTOPHOBIA: 0
VOMITING: 0
SHORTNESS OF BREATH: 0
EYE REDNESS: 0
SHORTNESS OF BREATH: 0
BACK PAIN: 1
WHEEZING: 0
WHEEZING: 0
EYE DISCHARGE: 0
SHORTNESS OF BREATH: 1
VOMITING: 0
NAUSEA: 0
COUGH: 0
WHEEZING: 0
SORE THROAT: 0
RHINORRHEA: 0
TROUBLE SWALLOWING: 0
CHOKING: 0
SORE THROAT: 0
WHEEZING: 0
CONSTIPATION: 0
VOMITING: 0
NAUSEA: 0
BACK PAIN: 0

## 2021-01-01 ASSESSMENT — PAIN DESCRIPTION - PAIN TYPE
TYPE: ACUTE PAIN
TYPE: CHRONIC PAIN

## 2021-01-01 ASSESSMENT — PAIN SCALES - GENERAL
PAINLEVEL_OUTOF10: 10
PAINLEVEL_OUTOF10: 7
PAINLEVEL_OUTOF10: 0
PAINLEVEL_OUTOF10: 7
PAINLEVEL_OUTOF10: 0
PAINLEVEL_OUTOF10: 9
PAINLEVEL_OUTOF10: 0
PAINLEVEL_OUTOF10: 3
PAINLEVEL_OUTOF10: 0
PAINLEVEL_OUTOF10: 3
PAINLEVEL_OUTOF10: 9
PAINLEVEL_OUTOF10: 0
PAINLEVEL_OUTOF10: 10
PAINLEVEL_OUTOF10: 0
PAINLEVEL_OUTOF10: 3
PAINLEVEL_OUTOF10: 0

## 2021-01-01 ASSESSMENT — PATIENT HEALTH QUESTIONNAIRE - PHQ9
SUM OF ALL RESPONSES TO PHQ QUESTIONS 1-9: 0
SUM OF ALL RESPONSES TO PHQ9 QUESTIONS 1 & 2: 0
SUM OF ALL RESPONSES TO PHQ QUESTIONS 1-9: 0
SUM OF ALL RESPONSES TO PHQ QUESTIONS 1-9: 0
2. FEELING DOWN, DEPRESSED OR HOPELESS: 0
1. LITTLE INTEREST OR PLEASURE IN DOING THINGS: 0

## 2021-01-01 ASSESSMENT — PAIN DESCRIPTION - DESCRIPTORS: DESCRIPTORS: CONSTANT

## 2021-01-01 ASSESSMENT — PAIN DESCRIPTION - LOCATION
LOCATION: LEG
LOCATION: GROIN

## 2021-01-01 ASSESSMENT — PAIN DESCRIPTION - ORIENTATION
ORIENTATION: RIGHT
ORIENTATION: LEFT

## 2021-01-01 ASSESSMENT — SOCIAL DETERMINANTS OF HEALTH (SDOH): HOW HARD IS IT FOR YOU TO PAY FOR THE VERY BASICS LIKE FOOD, HOUSING, MEDICAL CARE, AND HEATING?: NOT HARD AT ALL

## 2021-04-15 NOTE — TELEPHONE ENCOUNTER
Rx request   Requested Prescriptions     Pending Prescriptions Disp Refills    lisinopril (PRINIVIL;ZESTRIL) 20 MG tablet 90 tablet 4     Sig: Take 1 tablet by mouth daily     LOV 3/13/2020  Next Visit Date:  No future appointments.

## 2021-08-25 PROBLEM — M54.50 CHRONIC BILATERAL LOW BACK PAIN WITHOUT SCIATICA: Chronic | Status: ACTIVE | Noted: 2021-01-01

## 2021-08-25 PROBLEM — R63.4 UNINTENTIONAL WEIGHT LOSS: Chronic | Status: ACTIVE | Noted: 2021-01-01

## 2021-08-25 PROBLEM — G89.29 CHRONIC BILATERAL LOW BACK PAIN WITHOUT SCIATICA: Chronic | Status: ACTIVE | Noted: 2021-01-01

## 2021-08-25 NOTE — Clinical Note
Patient complains of new phone system. Was difficult to navigate. Was prohibited from speaking with this office. Appointment reminder was left three times and then was called by this office for reminder calls. ALEXA.

## 2021-08-25 NOTE — PROGRESS NOTES
Subjective  Johnny Wood, 59 y.o. male presents today with:  Chief Complaint   Patient presents with    Back Pain     lower back pain for a while; unable to leave sample water is given     Other     when he is trying to sneeze there is some left side chest tightness and it's hard to sneeze; loss of appetite and right arm and shoulder discomfort           HPI    Urine is \"real dirty compared to previous\" for a couple of months. Has been having vague low back pain. Pain is worst if he is laying on his couch and then has to get up. Pain increases with lumbar flexion and with lifting heavy objects Getting out of bed is getting easier because he has adjusted how he arises. \"It used to be torture. \"  No numbness of tingling in legs. No saddle anesthesia. . No weakness. Back pain seems to be getting better over the last 2 days. No known trauma though, when he was put in a specific new truck,  The pain is starting to get worse. He quit two weeks ago. Hernia has gotten \"bigger than normal\" but has no pain. History of hyperglycemia - Today's A1C is 5.4%. When he has urge to sneeze he gets an unusual pain in right anterior rib cage and he can not complete sneeze. It occurs at least twice a week. No pain with deep inspiration. Does not exercise. Weight loss. Has lost 25 pounds without intention since April 2018. Has loss of appetite and sweats after he eats. No fevers, chills, sweats. No abdominal pain, nausea, vomiting, diarrhea, constipation, bloody stools, black tarry stools. No rashes. No swollen glands. No other questions and or concerns for today's visit    Review of Systems feels like he is losing muscle mass, needs more energy, loss of appetite Neck, shoulder arm on right used to hurt every once in while but not lately.        Past Medical History:   Diagnosis Date    Chronic bilateral low back pain without sciatica 8/25/2021    Chronic bilateral low back pain without sciatica 8/25/2021    Essential hypertension     Pure hypercholesterolemia 2018     History reviewed. No pertinent surgical history. Social History     Socioeconomic History    Marital status: Single     Spouse name: Not on file    Number of children: Not on file    Years of education: Not on file    Highest education level: Not on file   Occupational History    Not on file   Tobacco Use    Smoking status: Former Smoker     Packs/day: 1.00     Years: 10.00     Pack years: 10.00     Types: Cigarettes     Start date: 1979     Quit date: 1988     Years since quittin.6    Smokeless tobacco: Never Used   Substance and Sexual Activity    Alcohol use: No    Drug use: No    Sexual activity: Not on file   Other Topics Concern    Not on file   Social History Narrative    Not on file     Social Determinants of Health     Financial Resource Strain: Low Risk     Difficulty of Paying Living Expenses: Not hard at all   Food Insecurity: No Food Insecurity    Worried About [facility] in the Last Year: Never true    [email or web address] in the Last Year: Never true   Transportation Needs: No Transportation Needs    Lack of Transportation (Medical): No    Lack of Transportation (Non-Medical):  No   Physical Activity:     Days of Exercise per Week:     Minutes of Exercise per Session:    Stress:     Feeling of Stress :    Social Connections:     Frequency of Communication with Friends and Family:     Frequency of Social Gatherings with Friends and Family:     Attends Bahai Services:     Active Member of Clubs or Organizations:     Attends Club or Organization Meetings:     Marital Status:    Intimate Partner Violence:     Fear of Current or Ex-Partner:     Emotionally Abused:     Physically Abused:     Sexually Abused:      Family History   Problem Relation Age of Onset    Cancer Mother     No Known Problems Brother     Other Sister     Cancer Sister     No Known Problems Sister     No Known Problems Brother      No Known Allergies  Current Outpatient Medications   Medication Sig Dispense Refill    lisinopril (PRINIVIL;ZESTRIL) 20 MG tablet Take 1 tablet by mouth daily 90 tablet 0     No current facility-administered medications for this visit. PMH, Surgical Hx, Family Hx, and Social Hxreviewed and updated. Health Maintenance reviewed. Objective    Vitals:    08/25/21 1534   BP: 112/80   Pulse: 97   Resp: 16   Temp: 97.2 °F (36.2 °C)   TempSrc: Temporal   SpO2: 96%   Weight: 188 lb 9.6 oz (85.5 kg)   Height: 6' (1.829 m)        Physical Exam  Constitutional:       General: He is not in acute distress. Appearance: He is well-developed. HENT:      Head: Normocephalic and atraumatic. Eyes:      General: No scleral icterus. Conjunctiva/sclera: Conjunctivae normal.   Cardiovascular:      Rate and Rhythm: Normal rate and regular rhythm. Heart sounds: Normal heart sounds. Pulmonary:      Effort: Pulmonary effort is normal. No respiratory distress. Breath sounds: Normal breath sounds. No wheezing or rales. Abdominal:      General: Bowel sounds are normal. There is no distension. Palpations: Abdomen is soft. There is no mass. Tenderness: There is no abdominal tenderness. There is no guarding or rebound. Musculoskeletal:      Cervical back: Normal range of motion and neck supple. Comments: Lumbar flexion 80 degrees, extension 10 degrees, right lateral 20 degrees , left lateral flexion - 25 degrees   NL squat, NL toe walk, NL heel walk; NL rotational position   Lymphadenopathy:      Cervical: No cervical adenopathy. Skin:     General: Skin is warm and dry. Neurological:      Mental Status: He is alert and oriented to person, place, and time. Psychiatric:         Mood and Affect: Mood normal.         Behavior: Behavior normal.         Thought Content:  Thought content normal.         Judgment: Judgment normal.           Lab Results   Component Value Date LABA1C 5.4 08/25/2021    LABA1C 5.7 03/01/2020     Lab Results   Component Value Date    CREATININE 0.70 03/01/2020     Lab Results   Component Value Date    ALT 24 03/01/2020    AST 18 03/01/2020     Lab Results   Component Value Date    CHOL 165 03/01/2020    TRIG 51 03/01/2020    HDL 40 03/01/2020    LDLCALC 115 03/01/2020        Assessment & Plan   Visit Diagnoses and Associated Orders     Essential hypertension    -  Primary    POCT Urinalysis no Micro [89949 Custom]      TSH Without Reflex [[id number] Custom]   - Future Order         Pure hypercholesterolemia             Abnormal glucose        POCT glycosylated hemoglobin (Hb A1C) [[id number] Custom]           Unintentional weight loss        TSH Without Reflex [[id number] Custom]   - Future Order         Non-recurrent unilateral inguinal hernia without obstruction or gangrene        CBC With Auto Differential [[id number] Custom]   - Future Order    Comprehensive Metabolic Panel [[id number] Custom]   - Future Order         Chronic bilateral low back pain without sciatica        Culture, Urine [[id number] Custom]   - Future Order         Screen for colon cancer        Jillian [id number] MD, Gastroenterology, [facility] Custom]           Screening for prostate cancer        PSA Screening [ Custom]   - Future Order                 Reviewed with the patient: all disease processes, current clinical status, medications, activities and diet.      Side effects, adverse effects of the medication prescribed today, as well as treatment plan/ rationale and result expectations have been discussed with the patient who expresses understanding and desires to proceed.     Close follow up to evaluate treatment results and for coordination of care. I have reviewed the patient's medical history in detail and updated the computerized patient record. More than 50% of the appointment was spent in face-to-face counseling, education and care coordination.       Orders Placed This Encounter Procedures    Culture, Urine     Standing Status:   Future     Number of Occurrences:   1     Standing Expiration Date:   8/25/2022     Order Specific Question:   Specify (ex-cath, midstream, cysto, etc)? Answer:   midstream    TSH Without Reflex     Standing Status:   Future     Standing Expiration Date:   8/25/2022    CBC With Auto Differential     Standing Status:   Future     Standing Expiration Date:   8/25/2022    Comprehensive Metabolic Panel     Standing Status:   Future     Standing Expiration Date:   11/25/2021    PSA Screening     Standing Status:   Future     Standing Expiration Date:   8/25/2022   [address] [place], [place] [address]dolly ElmoreYajaira, Gastroenterology, [place]     Referral Priority:   Routine     Referral Type:   Eval and Treat     Referral Reason:   Specialty Services Required     Referred to Provider:   Francisca Mejia MD     Requested Specialty:   Gastroenterology     Number of Visits Requested:   1    POCT Urinalysis no Micro    POCT glycosylated hemoglobin (Hb A1C)     No orders of the defined types were placed in this encounter. There are no discontinued medications. Return in about 3 weeks (around 9/15/2021) for labs and back pain. Controlled Substance Monitoring:    Acute and Chronic Pain Monitoring:   RX Monitoring 1/8/2018   Attestation The Prescription Monitoring Report for this patient was reviewed today.            Cheryl Bauer MD

## 2021-08-25 NOTE — PATIENT INSTRUCTIONS
Patient Education        Back Stretches: Exercises  Introduction  Here are some examples of exercises for stretching your back. Start each exercise slowly. Ease off the exercise if you start to have pain. Your doctor or physical therapist will tell you when you can start these exercises and which ones will work best for you. How to do the exercises  Overhead stretch   1. Stand comfortably with your feet shoulder-width apart. 2. Looking straight ahead, raise both arms over your head and reach toward the ceiling. Do not allow your head to tilt back. 3. Hold for 15 to 30 seconds, then lower your arms to your sides. 4. Repeat 2 to 4 times. Side stretch   1. Stand comfortably with your feet shoulder-width apart. 2. Raise one arm over your head, and then lean to the other side. 3. Slide your hand down your leg as you let the weight of your arm gently stretch your side muscles. Hold for 15 to 30 seconds. 4. Repeat 2 to 4 times on each side. Press-up   1. Lie on your stomach, supporting your body with your forearms. 2. Press your elbows down into the floor to raise your upper back. As you do this, relax your stomach muscles and allow your back to arch without using your back muscles. As your press up, do not let your hips or pelvis come off the floor. 3. Hold for 15 to 30 seconds, then relax. 4. Repeat 2 to 4 times. Relax and rest   1. Lie on your back with a rolled towel under your neck and a pillow under your knees. Extend your arms comfortably to your sides. 2. Relax and breathe normally. 3. Remain in this position for about 10 minutes. 4. If you can, do this 2 or 3 times each day. Follow-up care is a key part of your treatment and safety. Be sure to make and go to all appointments, and call your doctor if you are having problems. It's also a good idea to know your test results and keep a list of the medicines you take. Where can you learn more? Go to https://doug.healthLionexpo. org and sign in to your [employer] account. Enter Z796 in the Zinc software box to learn more about \"Back Stretches: Exercises. \"     If you do not have an account, please click on the \"Sign Up Now\" link. Current as of: November 16, 2020               Content Version: 12.9  © 2006-2021 [employer]. Care instructions adapted under license by [facility] ([facility]). If you have questions about a medical condition or this instruction, always ask your healthcare professional. [address] any warranty or liability for your use of this information. Patient Education        Therapeutic Ball: Back Exercises  Introduction  Here are some examples of typical exercises for your condition. Start each exercise slowly. Ease off the exercise if you start to have pain. Your doctor or physical therapist will tell you when you can start these exercises and which ones will work best for you. To prepare, make sure that your ball is the right size for you. When inflated and firm, it should allow you to sit with your hips and knees bent at about a 90-degree angle (like the letter L). How to do the exercises  Seated position on ball   1. Use this exercise to get used to moving on the ball and to find your best sitting position. 2. Sit comfortably on the ball with your feet about hip-width apart. If you feel unsteady, rest your hands on the ball near your hips. 3. As you do this exercise, try to keep your shoulders and upper body relaxed and still. 4. Using your stomach and back muscles to move your pelvis, roll the ball forward. This will round your back. 5. Still using your stomach and back muscles, roll the ball back. You will arch your back. 6. Repeat this rounding-arching motion a few times. 7. Stop in between the two positions, where your back is not rounded or arched. This is called your neutral position. Pelvic rotation   1. Sit tall on the ball.   2. Slowly rotate your hips in a [place] pattern. Keep the movement focused at your hips. 3. Repeat, but [place] in the other direction. 4. Repeat 8 to 12 times. Postural sitting   1. Use this position to find a stable, relaxed posture on the ball. You can use this position as your starting point for other ball exercises. If you feel unsteady on the ball, start on a chair first.  2. Sit on a ball or chair, with your feet planted straight in front of you. 3. Imagine that a string at the top of your head is pulling you straight up. Think of yourself as 2 inches taller than you are.  4. Slightly tuck your chin. 5. Keep your shoulders back and relaxed. Knee extension   1. Sit tall on the ball with your feet planted in front of you, hip-width apart. As you do this exercise, avoid slumping your shoulders and arching your back. 2. Rest your hands on the ball near your hip or a steady object next to you. (If you feel very stable on the ball, rest your hands in your lap or at your side.)  3. Slowly straighten one leg at the knee. Slowly lower it back down. Repeat with the other leg. 4. Repeat this exercise 8 to 12 times. Roll-ups   1. Lie on your back with your knees bent, feet resting on the floor. 2. Lay the ball on your thighs. Rest your hands up high on the ball. 3. Raising your head and shoulder blades, roll the ball up your thighs. Exhale as you roll up. 4. If this is hard on your neck, gently support your lower head and upper neck with one hand. Don't use that hand to pull your head up. 5. Repeat 8 to 12 times. Ball curls   1. Lie on your back with your ankles resting on the ball, knees straight. 2. Use your legs to roll the exercise ball toward you. Allow your knees to bend and move closer to your chest.  3. Pause briefly, and then roll the ball to the starting position. Try to keep the ball rolling straight. You will feel the muscles in your lower belly working. 4. Repeat 8 to 12 times. Bridge with ball under legs   1.  [place] on your back with your legs up, calves resting on the ball. For more challenge, rest your heels on the ball. 2. Look up at the ceiling, and keep your chin relaxed. You can place a small pillow under your head or neck for comfort. 3. With your arms by your side, press your hands onto the floor for stability. 4. Tighten your belly muscles by pulling in your belly button toward your spine. 5. Push your heels down toward the floor, squeeze your buttocks, and lift your hips off the floor until your shoulders, hips, and knees are all in a straight line. 6. Try to keep the ball steady. Hold for about 6 seconds as you continue to breathe normally. 7. Slowly lower your hips back down to the floor. 8. Repeat 8 to 12 times. Ball curls with bridge   1. Start flat on your back with your ankles resting on the ball. 2. Look up at the ceiling, and keep your chin relaxed. You can place a small pillow under your head or neck for comfort. 3. With your arms by your side, press your hands onto the floor for stability. 4. Tighten your belly muscles by pulling in your belly button toward your spine. 5. Push your heels down toward the floor, squeeze your buttocks, and lift your hips off the floor until your shoulders, hips, and knees are all in a straight line. 6. While holding the bridge position, roll the ball toward you with your heels. Keep your hips as level as you can. 7. Pause briefly, and then roll the ball back out. Try to keep the ball rolling straight. You will feel the muscles in your lower belly working as you straighten your legs. 8. Lower your hips, and return to your starting position. 9. Repeat 8 to 12 times. 10. When you can keep your body and the ball steady throughout this exercise, you're ready for more challenge. Try keeping your hips raised while rolling the ball out, holding the bridge, and rolling back, a few times in a row. Praying mantis   1.  Kneel upright with the ball in front of you.  2. To start, clasp your hands together. Rest them on the ball in front of you. 3. As you do this exercise, keep your back and hips straight and tighten your belly and buttocks muscles. Keep your knees in place. 4. Press on the ball with your arms. Lean forward from the knees. This rolls the ball forward. You will bear most of your weight on your arms. 5. If your back starts to ache, you've gone too far. Pull back a bit. 6. Roll back to the start position. 7. Repeat 8 to 12 times. Walk-out plank on ball   1. Kneel over the ball. Place your hands on the floor in front of you. 2. Walk your hands forward until your legs are straight on the ball. This is the plank position. 3. When in plank position, hold your body straight and tighten your belly and buttocks muscles. Keep your chin slightly tucked. 4. Roll as far forward as you can without losing your balance or letting your hips drop. You may stop with the ball under your thighs, or even under your knees or shins. 5. Hold a few seconds, then walk your hands back and return to the start position. 6. Repeat 8 to 12 times. Push-up with thighs on ball   1. Kneel over the ball. Place your hands on the floor in front of you. 2. Walk your hands forward until your legs are straight on the ball. This is the plank position. 3. When in plank position, hold your body straight and tighten your belly and buttocks muscles. Keep your chin slightly tucked. 4. Roll as far forward as you can without losing your balance or letting your hips drop. You may stop with the ball under your thighs, or even under your knees or shins. 5. Bend your elbows. Slowly lower your body toward the ground as far as you can without losing your balance. 6. If your wrists hurt, try moving your hands a little farther apart so they're not right under your shoulders. 7. Slowly straighten your arms. 8. Do 8 to 12 of these push-ups. Wall squat with ball   1.  Stand facing away from a lower back. · Try sitting on an exercise ball. It can rock from side to side, which helps keep your back loose. · When driving, keep your knees nearly level with your hips. Sit straight, and drive with both hands on the steering wheel. Your arms should be in a slightly bent position. Reduce stress on your back through careful lifting   · Squat down, bending at the hips and knees only. If you need to, put one knee to the floor and extend your other knee in front of you, bent at a right angle (half kneeling). · Press your chest straight forward. This helps keep your upper back straight while keeping a slight arch in your low back. · Hold the load as close to your body as possible, at the level of your belly button (navel). · Use your feet to change direction, taking small steps. · Lead with your hips as you change direction. Keep your shoulders in line with your hips as you move. · Set down your load carefully, squatting with your knees and hips only. Exercise and stretch your back   · Do some exercise on most days of the week, if your doctor says it is okay. You can walk, run, swim, or cycle. · Stretch your back muscles. Here are a few exercises to try:  ? Lie on your back, and gently pull one bent knee to your chest. Put that foot back on the floor, and then pull the other knee to your chest.  ? Do pelvic tilts. Lie on your back with your knees bent. Tighten your stomach muscles. Pull your belly button (navel) in and up toward your ribs. You should feel like your back is pressing to the floor and your hips and pelvis are slightly lifting off the floor. Hold for 6 seconds while breathing smoothly. ? Sit with your back flat against a wall. · Keep your core muscles strong. The muscles of your back, belly (abdomen), and buttocks support your spine. ? Pull in your belly and imagine pulling your navel toward your spine. Hold this for 6 seconds, then relax.  Remember to keep breathing normally as you tense your muscles. ? Do curl-ups. Always do them with your knees bent. Keep your low back on the floor, and curl your shoulders toward your knees using a smooth, slow motion. Keep your arms folded across your chest. If this bothers your neck, try putting your hands behind your neck (not your head), with your elbows spread apart. ? Lie on your back with your knees bent and your feet flat on the floor. Tighten your belly muscles, and then push with your feet and raise your buttocks up a few inches. Hold this position 6 seconds as you continue to breathe normally, then lower yourself slowly to the floor. Repeat 8 to 12 times. ? If you like group exercise, try Pilates or yoga. These classes have poses that strengthen the core muscles. Lead a healthy lifestyle   · Stay at a healthy weight to avoid strain on your back. · Do not smoke. Smoking increases the risk of osteoporosis, which weakens the spine. If you need help quitting, talk to your doctor about stop-smoking programs and medicines. These can increase your chances of quitting for good. Where can you learn more? Go to https://[place].Waterfall. org and sign in to your [employer] account. Enter L315 in the App[employer]entive box to learn more about \"Learning About How to Have a Healthy Back. \"     If you do not have an account, please click on the \"Sign Up Now\" link. Current as of: November 16, 2020               Content Version: 12.9  © 8334-1188 [employer]. Care instructions adapted under license by [facility]. If you have questions about a medical condition or this instruction, always ask your healthcare professional. [address] any warranty or liability for your use of this information. Patient Education        Therapeutic Ball: Back Exercises  Introduction  Here are some examples of typical exercises for your condition. Start each exercise slowly. Ease off the exercise if you start to have pain.  Your doctor or physical therapist will tell you when you can start these exercises and which ones will work best for you. To prepare, make sure that your ball is the right size for you. When inflated and firm, it should allow you to sit with your hips and knees bent at about a 90-degree angle (like the letter L). How to do the exercises  Seated position on ball   8. Use this exercise to get used to moving on the ball and to find your best sitting position. 9. Sit comfortably on the ball with your feet about hip-width apart. If you feel unsteady, rest your hands on the ball near your hips. 10. As you do this exercise, try to keep your shoulders and upper body relaxed and still. 11. Using your stomach and back muscles to move your pelvis, roll the ball forward. This will round your back. 12. Still using your stomach and back muscles, roll the ball back. You will arch your back. 13. Repeat this rounding-arching motion a few times. 14. Stop in between the two positions, where your back is not rounded or arched. This is called your neutral position. Pelvic rotation   5. Sit tall on the ball. 6. Slowly rotate your hips in a [place] pattern. Keep the movement focused at your hips. 7. Repeat, but [place] in the other direction. 8. Repeat 8 to 12 times. Postural sitting   6. Use this position to find a stable, relaxed posture on the ball. You can use this position as your starting point for other ball exercises. If you feel unsteady on the ball, start on a chair first.  7. Sit on a ball or chair, with your feet planted straight in front of you. 8. Imagine that a string at the top of your head is pulling you straight up. Think of yourself as 2 inches taller than you are.  9. Slightly tuck your chin. 10. Keep your shoulders back and relaxed. Knee extension   5. Sit tall on the ball with your feet planted in front of you, hip-width apart.  As you do this exercise, avoid slumping your shoulders and arching your back.  6. Rest your hands on the ball near your hip or a steady object next to you. (If you feel very stable on the ball, rest your hands in your lap or at your side.)  7. Slowly straighten one leg at the knee. Slowly lower it back down. Repeat with the other leg. 8. Repeat this exercise 8 to 12 times. Roll-ups   6. Lie on your back with your knees bent, feet resting on the floor. 7. Lay the ball on your thighs. Rest your hands up high on the ball. 8. Raising your head and shoulder blades, roll the ball up your thighs. Exhale as you roll up. 9. If this is hard on your neck, gently support your lower head and upper neck with one hand. Don't use that hand to pull your head up. 10. Repeat 8 to 12 times. Ball curls   5. Lie on your back with your ankles resting on the ball, knees straight. 6. Use your legs to roll the exercise ball toward you. Allow your knees to bend and move closer to your chest.  7. Pause briefly, and then roll the ball to the starting position. Try to keep the ball rolling straight. You will feel the muscles in your lower belly working. 8. Repeat 8 to 12 times. Bridge with ball under legs   9. Lie on your back with your legs up, calves resting on the ball. For more challenge, rest your heels on the ball. 10. Look up at the ceiling, and keep your chin relaxed. You can place a small pillow under your head or neck for comfort. 11. With your arms by your side, press your hands onto the floor for stability. 12. Tighten your belly muscles by pulling in your belly button toward your spine. 13. Push your heels down toward the floor, squeeze your buttocks, and lift your hips off the floor until your shoulders, hips, and knees are all in a straight line. 14. Try to keep the ball steady. Hold for about 6 seconds as you continue to breathe normally. 15. Slowly lower your hips back down to the floor. 16. Repeat 8 to 12 times. Ball curls with bridge   11.  Start flat on your back with your ankles resting on the ball. 12. Look up at the ceiling, and keep your chin relaxed. You can place a small pillow under your head or neck for comfort. 13. With your arms by your side, press your hands onto the floor for stability. 15. Tighten your belly muscles by pulling in your belly button toward your spine. 15. Push your heels down toward the floor, squeeze your buttocks, and lift your hips off the floor until your shoulders, hips, and knees are all in a straight line. 16. While holding the bridge position, roll the ball toward you with your heels. Keep your hips as level as you can. 17. Pause briefly, and then roll the ball back out. Try to keep the ball rolling straight. You will feel the muscles in your lower belly working as you straighten your legs. 18. Lower your hips, and return to your starting position. 19. Repeat 8 to 12 times. 20. When you can keep your body and the ball steady throughout this exercise, you're ready for more challenge. Try keeping your hips raised while rolling the ball out, holding the bridge, and rolling back, a few times in a row. Praying chika   8. Kneel upright with the ball in front of you. 9. To start, clasp your hands together. Rest them on the ball in front of you. 10. As you do this exercise, keep your back and hips straight and tighten your belly and buttocks muscles. Keep your knees in place. 11. Press on the ball with your arms. Lean forward from the knees. This rolls the ball forward. You will bear most of your weight on your arms. 12. If your back starts to ache, you've gone too far. Pull back a bit. 13. Roll back to the start position. 14. Repeat 8 to 12 times. Walk-out plank on ball   7. Kneel over the ball. Place your hands on the floor in front of you. 8. Walk your hands forward until your legs are straight on the ball. This is the plank position.   9. When in plank position, hold your body straight and tighten your belly and buttocks muscles. Keep your chin slightly tucked. 10. Roll as far forward as you can without losing your balance or letting your hips drop. You may stop with the ball under your thighs, or even under your knees or shins. 11. Hold a few seconds, then walk your hands back and return to the start position. 12. Repeat 8 to 12 times. Push-up with thighs on ball   9. Kneel over the ball. Place your hands on the floor in front of you. 10. Walk your hands forward until your legs are straight on the ball. This is the plank position. 11. When in plank position, hold your body straight and tighten your belly and buttocks muscles. Keep your chin slightly tucked. 12. Roll as far forward as you can without losing your balance or letting your hips drop. You may stop with the ball under your thighs, or even under your knees or shins. [address] your elbows. Slowly lower your body toward the ground as far as you can without losing your balance. 14. If your wrists hurt, try moving your hands a little farther apart so they're not right under your shoulders. 15. Slowly straighten your arms. 16. Do 8 to 12 of these push-ups. Wall squat with ball   9. Stand facing away from a wall. Place your feet about shoulder-width apart. 10. Place the ball between your middle back and the wall. Move your feet out in front of you so they are about a foot in front of your hips. 11. Keep your arms at your sides, or put your hands on your hips. 12. Slowly squat down as if you are going to sit in a chair, rolling your back over the ball as you squat. The ball should move with you but stay pressed into the wall. 13. Be sure that your knees do not go in front of your toes as you squat. 14. Hold for 6 seconds. 15. Slowly rise to your standing position. 16. Repeat 8 to 12 times. Child's pose with ball   5. Kneeling upright with your back straight, rest your hands on the ball in front of you.   6. Breathe out as you bend at the hips, and roll the ball forward. Lower your chest toward the ground, and drop your hips back toward your heels. 7. To stretch your upper back and shoulders, hold this position for 15 to 30 seconds. 8. Repeat 2 to 4 times. Follow-up care is a key part of your treatment and safety. Be sure to make and go to all appointments, and call your doctor if you are having problems. It's also a good idea to know your test results and keep a list of the medicines you take. Where can you learn more? Go to https://Micro Interventional DevicespesatinderPushereb.Banjo. org and sign in to your in[employer]air account. Enter U357 in the Equidate box to learn more about \"Therapeutic Ball: Back Exercises. \"     If you do not have an account, please click on the \"Sign Up Now\" link. Current as of: November 16, 2020               Content Version: 12.9  © 2006-2021 [employer]. Care instructions adapted under license by [facility] ([facility]). If you have questions about a medical condition or this instruction, always ask your healthcare professional. [address] any warranty or liability for your use of this information. Patient Education        Home Blood Pressure Test: About This Test  What is it? A home blood pressure test allows you to keep track of your blood pressure at home. Blood pressure is a measure of the force of blood against the walls of your arteries. Blood pressure readings include two numbers, such as 130/80 (say \"130 over 80\"). The first number is the systolic pressure. The second number is the diastolic pressure. Why is this test done? You may do this test at home to:  · Find out if you have high blood pressure. · Track your blood pressure if you have high blood pressure. · Track how well medicine is working to reduce high blood pressure. · Check how lifestyle changes, such as weight loss and exercise, are affecting blood pressure.   How do you prepare for the test?  For at least 30 minutes before you take your blood pressure, don't exercise, drink caffeine, or smoke. Empty your bladder before the test. Sit quietly with your back straight and both feet on the floor for at least 5 minutes. This helps you take your blood pressure while you feel comfortable and relaxed. How is the test done? · If your doctor recommends it, take your blood pressure twice a day. Take it in the morning and evening. · Sit with your arm slightly bent and resting on a table so that your upper arm is at the same level as your heart. · Use the same arm each time you take your blood pressure. · Place the blood pressure cuff on the bare skin of your upper arm. You may have to roll up your sleeve, remove your arm from the sleeve, or take your shirt off. · Wrap the blood pressure cuff around your upper arm so that the lower edge of the cuff is about 1 inch above the bend of your elbow. · Do not move, talk, or text while you take your blood pressure. Follow the instructions that came with your blood pressure monitor. They might be different from the following. · Press the on/off button on the automatic monitor. Then you may need to wait until the screen says the monitor is ready. · Press the start button. The cuff will inflate and deflate by itself. · Your blood pressure numbers will appear on the screen. · Wait one minute and take your blood pressure again. · If your monitor does not automatically save your numbers, write them in your log book, along with the date and time. Follow-up care is a key part of your treatment and safety. Be sure to make and go to all appointments, and call your doctor if you are having problems. It's also a good idea to keep a list of the medicines you take. Where can you learn more? Go to https://chwily.Drill Cycle. org and sign in to your [employer] account.  Enter C427 in the Vision Sciences box to learn more about \"Home Blood Pressure Test: About This Test.\"     If you do not have an account, please click on the \"Sign Up Now\" link. Current as of: August 31, 2020               Content Version: 12.9  © 2006-2021 [employer]. Care instructions adapted under license by [facility] [address] If you have questions about a medical condition or this instruction, always ask your healthcare professional. [address] any warranty or liability for your use of this information. Patient Education        Learning About Relief for Back Pain  What is back strain? Back strain is an injury that happens when you overstretch, or pull, a muscle in your back. You may hurt your back in an accident or when you exercise or lift something. Most back pain gets better with rest and time. You can take care of yourself at home to help your back heal.  What can you do first to relieve back pain? When you first feel back pain, try these steps:  · Walk. Take a short walk (10 to 20 minutes) on a level surface (no slopes, hills, or stairs) every 2 to 3 hours. Walk only distances you can manage without pain, especially leg pain. · Relax. Find a comfortable position for rest. Some people are comfortable on the floor or a medium-firm bed with a small pillow under their head and another under their knees. Some people prefer to lie on their side with a pillow between their knees. Don't stay in one position for too long. · Try heat or ice. Try using a heating pad on a low or medium setting, or take a warm shower, for 15 to 20 minutes every 2 to 3 hours. Or you can buy single-use heat wraps that last up to 8 hours. You can also try an ice pack for 10 to 15 minutes every 2 to 3 hours. You can use an ice pack or a bag of frozen vegetables wrapped in a thin towel. There is not strong evidence that either heat or ice will help, but you can try them to see if they help. You may also want to try switching between heat and cold. · Take pain medicine exactly as directed.   ? If the doctor gave you a prescription medicine for pain, take it as prescribed. ? If you are not taking a prescription pain medicine, ask your doctor if you can take an over-the-counter medicine. What else can you do? · Stretch and exercise. Exercises that increase flexibility may relieve your pain and make it easier for your muscles to keep your spine in a good, neutral position. And don't forget to keep walking. · Do self-massage. You can use self-massage to unwind after work or school or to energize yourself in the morning. You can easily massage your feet, hands, or neck. Self-massage works best if you are in comfortable clothes and are sitting or lying in a comfortable position. Use oil or lotion to massage bare skin. · Reduce stress. Back pain can lead to a vicious [place]: Distress about the pain tenses the muscles in your back, which in turn causes more pain. Learn how to relax your mind and your muscles to lower your stress. Where can you learn more? Go to https://iContainers.[employer]hou [employer]star[employer]. org and sign in to your [employer] account. Enter N845 in the LensVector box to learn more about \"Learning About Relief for Back Pain. \"     If you do not have an account, please click on the \"Sign Up Now\" link. Current as of: November 16, 2020               Content Version: 12.9  © 5069-0765 Healthwise, Incorporated. Care instructions adapted under license by [facility] ([facility]). If you have questions about a medical condition or this instruction, always ask your healthcare professional. [place] [address] any warranty or liability for your use of this information. Patient Education        Back Care and Preventing Injuries: Care Instructions  Your Care Instructions     You can hurt your back doing many everyday activities: lifting a heavy box, bending down to garden, exercising at the gym, and even getting out of bed. But you can keep your back strong and healthy by doing some exercises.  You also can follow a few tips for sitting, sleeping, and lifting to avoid hurting your back again. Talk to your doctor before you start an exercise program. Ask for help if you want to learn more about keeping your back healthy. Follow-up care is a key part of your treatment and safety. Be sure to make and go to all appointments, and call your doctor if you are having problems. It's also a good idea to know your test results and keep a list of the medicines you take. How can you care for yourself at home? · Stay at a healthy weight to avoid strain on your lower back. · Do not smoke. Smoking increases the risk of osteoporosis, which weakens the spine. If you need help quitting, talk to your doctor about stop-smoking programs and medicines. These can increase your chances of quitting for good. · Make sure you sleep in a position that maintains your back's normal curves and on a mattress that feels comfortable. Sleep on your side with a pillow between your knees, or sleep on your back with a pillow under your knees. These positions can reduce strain on your back. · When you get out of bed, lie on your side and bend both knees. Drop your feet over the edge of the bed as you push up with both arms. Scoot to the edge of the bed. Make sure your feet are in line with your rear end (buttocks), and then stand up. · If you must stand for a long time, put one foot on a stool, ledge, or box. Exercise to strengthen your back and other muscles  · Get at least 30 minutes of exercise on most days of the week. Walking is a good choice. You also may want to do other activities, such as running, swimming, cycling, or playing tennis or team sports. · Stretch your back muscles. Here are few exercises to try:  ? Lie on your back with your knees bent and your feet flat on the floor. Gently pull one bent knee to your chest. Put that foot back on the floor, and then pull the other knee to your chest. Hold for 15 to 30 seconds.  Repeat 2 to 4 times.  ? Do pelvic tilts. Lie on your back with your knees bent. Tighten your stomach muscles. Pull your belly button (navel) in and up toward your ribs. You should feel like your back is pressing to the floor and your hips and pelvis are slightly lifting off the floor. Hold for 6 seconds while breathing smoothly. · Keep your core muscles strong. The muscles of your back, belly (abdomen), and buttocks support your spine. ? Pull in your belly, and imagine pulling your navel toward your spine. Hold this for 6 seconds, then relax. Remember to keep breathing normally as you tense your muscles. ? Do curl-ups. Always do them with your knees bent. Keep your low back on the floor, and curl your shoulders toward your knees using a smooth, slow motion. Keep your arms folded across your chest. If this bothers your neck, try putting your hands behind your neck (not your head), with your elbows spread apart. ? Lie on your back with your knees bent and your feet flat on the floor. Tighten your belly muscles, and then push with your feet and raise your buttocks up a few inches. Hold this position 6 seconds as you continue to breathe normally, then lower yourself slowly to the floor. Repeat 8 to 12 times. ? If you like group exercise, try Pilates or yoga. These classes have poses that strengthen the core muscles. Protect your back when you sit  · Place a small pillow, a rolled-up towel, or a lumbar roll in the curve of your back if you need extra support. · Sit in a chair that is low enough to let you place both feet flat on the floor with both knees nearly level with your hips. If your chair or desk is too high, use a foot rest to raise your knees. · When driving, keep your knees nearly level with your hips. Sit straight, and drive with both hands on the steering wheel. Your arms should be in a slightly bent position. · Try a kneeling chair, which helps tilt your hips forward.  This takes pressure off your lower back.  · Try sitting on an exercise ball. It can rock from side to side, which helps keep your back loose. Lift properly  · Squat down, bending at the hips and knees only. If you need to, put one knee to the floor and extend your other knee in front of you, bent at a right angle (half kneeling). · Press your chest straight forward. This helps keep your upper back straight while keeping a slight arch in your low back. · Hold the load as close to your body as possible, at the level of your navel. · Use your feet to change direction, taking small steps. · Lead with your hips as you change direction. Keep your shoulders in line with your hips as you move. Do not twist your body. · Set down your load carefully, squatting with your knees and hips only. When should you call for help? Watch closely for changes in your health, and be sure to contact your doctor if you have any problems. Where can you learn more? Go to https://Wukong.com.Topmission. org and sign in to your [employer] account. Enter S810 in the Relive box to learn more about \"Back Care and Preventing Injuries: Care Instructions. \"     If you do not have an account, please click on the \"Sign Up Now\" link. Current as of: November 16, 2020               Content Version: 12.9  © 2006-2021 Healthwise, Incorporated. Care instructions adapted under license by [facility] ([facility]). If you have questions about a medical condition or this instruction, always ask your healthcare professional. [place] [address] any warranty or liability for your use of this information. Kim quinteros

## 2021-09-14 NOTE — TELEPHONE ENCOUNTER
Pt called in to reschedule apt he had canceled for Friday 9/17. There is no availability on Friday anymore. I called patient and LM to get him scheduled on next available with [facility]. Thank you.

## 2021-10-30 NOTE — ED PROVIDER NOTES
[address] [facility] ED  eMERGENCY dEPARTMENT eNCOUnter      Pt Name: Ran Lundberg  MRN: [id number]  [place] 1960  Date of evaluation: 10/30/2021  Provider: Abigail Miner PA-C    CHIEF COMPLAINT       Chief Complaint   Patient presents with    Inguinal Hernia     Right side         HISTORY OF PRESENT ILLNESS   (Location/Symptom, Timing/Onset,Context/Setting, Quality, Duration, Modifying Factors, Severity)  Note limiting factors. Ran Lundberg is a 59 y.o. male who presents to the emergency department complaint of worsening right inguinal pain which patient states been present for over 2 years. Patient states 2 years ago he was supposed to follow-up with Dr. Azam Navarrete for hernia repair, he states he never followed up and kept this appointment. Patient states he has decreased appetite, increasing pain in the area of the right inguinal region, no nausea or vomiting, no urinary complaints, no constipation or diarrhea. Patient is rating his current pain at this time is a 10 out of 10. Patient was seen by his regular family physician had a CT scan completed on 10/2/2021, and was referred back to Dr. Azam Navarrete for this upcoming Thursday, but patient states he cannot wait, he states the pain is too intense. Past medical history significant for chronic low back pain, hypercholesterolemia, hypertension. HPI    NursingNotes were reviewed. REVIEW OF SYSTEMS    (2-9 systems for level 4, 10 or more for level 5)     Review of Systems   Constitutional: Negative for activity change and appetite change. HENT: Negative for congestion, ear discharge, ear pain, nosebleeds, rhinorrhea and sore throat. Eyes: Negative for discharge. Respiratory: Negative for shortness of breath. Cardiovascular: Negative for chest pain, palpitations and leg swelling. Gastrointestinal: Positive for abdominal pain. Negative for abdominal distention, constipation, nausea, rectal pain and vomiting.    Genitourinary: Negative for difficulty urinating and dysuria. Musculoskeletal: Negative for arthralgias. Skin: Negative for color change, pallor and wound. Neurological: Negative for dizziness, tremors, syncope, weakness, numbness and headaches. Psychiatric/Behavioral: Negative for agitation and confusion. Except as noted above the remainder of the review of systems was reviewed and negative. PAST MEDICAL HISTORY     Past Medical History:   Diagnosis Date    Chronic bilateral low back pain without sciatica 2021    Chronic bilateral low back pain without sciatica 2021    Essential hypertension     Pure hypercholesterolemia 2018         SURGICALHISTORY     History reviewed. No pertinent surgical history. CURRENT MEDICATIONS       Previous Medications    LISINOPRIL (PRINIVIL;ZESTRIL) 20 MG TABLET    Take 1 tablet by mouth daily       ALLERGIES     Patient has no known allergies.     FAMILY HISTORY       Family History   Problem Relation Age of Onset   Tere Hand Cancer Mother     No Known Problems Brother     Other Sister     Cancer Sister     No Known Problems Sister     No Known Problems Brother           SOCIAL HISTORY       Social History     Socioeconomic History    Marital status: Single     Spouse name: None    Number of children: None    Years of education: None    Highest education level: None   Occupational History    None   Tobacco Use    Smoking status: Former Smoker     Packs/day: 1.00     Years: 10.00     Pack years: 10.00     Types: Cigarettes     Start date: 1979     Quit date: 1988     Years since quittin.8    Smokeless tobacco: Never Used   Vaping Use    Vaping Use: Never used   Substance and Sexual Activity    Alcohol use: No    Drug use: No    Sexual activity: None   Other Topics Concern    None   Social History Narrative    None     Social Determinants of Health     Financial Resource Strain: Low Risk     Difficulty of Paying Living Expenses: Not hard at Normal rate. Pulses: Normal pulses. Heart sounds: Normal heart sounds. No murmur heard. No friction rub. No gallop. Pulmonary:      Effort: Pulmonary effort is normal. No tachypnea, accessory muscle usage, respiratory distress or retractions. Breath sounds: No stridor. No wheezing, rhonchi or rales. Chest:      Chest wall: No tenderness. Abdominal:      General: Abdomen is flat. Bowel sounds are normal. There is no distension or abdominal bruit. Palpations: There is no shifting dullness, fluid wave, hepatomegaly, splenomegaly, mass or pulsatile mass. Tenderness: There is no abdominal tenderness. There is no right CVA tenderness, left CVA tenderness, guarding or rebound. Negative signs include Brand's sign, Rovsing's sign and McBurney's sign. Hernia: A hernia is present. Comments: Patient has large right inguinal hernia no pain on palpation, no guarding, no rebound. Bowel sounds present in all 4 quadrants. Musculoskeletal:         General: No deformity. Cervical back: Normal range of motion and neck supple. No rigidity. Skin:     General: Skin is warm and dry. Capillary Refill: Capillary refill takes less than 2 seconds. Coloration: Skin is not jaundiced or pale. Findings: No lesion or rash. Neurological:      General: No focal deficit present. Mental Status: He is alert and oriented to person, place, and time. Mental status is at baseline. Cranial Nerves: No cranial nerve deficit. Sensory: No sensory deficit. Motor: No weakness.       Coordination: Coordination normal.   Psychiatric:         Mood and Affect: Mood normal.         DIAGNOSTIC RESULTS     EKG: All EKG's are interpreted by the Emergency Department Physician who either signs or Co-signsthis chart in the absence of a cardiologist.        RADIOLOGY:   Non-plain filmimages such as CT, Ultrasound and MRI are read by the radiologist. Plain radiographic images are visualized and preliminarily interpreted by the emergency physician with the below findings:        Interpretation per the Radiologist below, if available at the time ofthis note:    CT ABDOMEN PELVIS W IV CONTRAST Additional Contrast? None   Final Result      Numerous bone and liver lesions as detailed most concerning for metastatic disease. No significant interval change of a large fat-containing right inguinal hernia. All CT scans at this facility use dose modulation, iterative reconstruction, and/or weight based dosing when appropriate to reduce radiation dose to as low as reasonably achievable. ED BEDSIDE ULTRASOUND:   Performed by ED Physician - none    LABS:  Labs Reviewed   COMPREHENSIVE METABOLIC PANEL - Abnormal; Notable for the following components:       Result Value    Anion Gap 16 (*)     Glucose 106 (*)     BUN 26 (*)     CREATININE 0.65 (*)     Total Protein 6.1 (*)     Albumin 3.4 (*)     Total Bilirubin 0.9 (*)     Alkaline Phosphatase 3,435 (*)     ALT 61 (*)      (*)     All other components within normal limits   CBC WITH AUTO DIFFERENTIAL - Abnormal; Notable for the following components:    RBC 4.24 (*)     Hemoglobin 12.4 (*)     Hematocrit 36.4 (*)     RDW 16.0 (*)     All other components within normal limits   LACTIC ACID, PLASMA - Abnormal; Notable for the following components:    Lactic Acid 2.4 (*)     All other components within normal limits   LIPASE   URINE RT REFLEX TO CULTURE       All other labs were within normal range or not returned as of this dictation.     EMERGENCY DEPARTMENT COURSE and DIFFERENTIAL DIAGNOSIS/MDM:   Vitals:    Vitals:    10/30/21 1352 10/30/21 1515 10/30/21 1646   BP: 105/70 101/67 117/79   Pulse: 101 102 97   Resp: 18 16 18   Temp: 98.2 °F (36.8 °C)     TempSrc: Oral     SpO2: 98% 98% 98%   Weight: 170 lb (77.1 kg)     Height: 5' 10\" (1.778 m)            MDM  Number of Diagnoses or Management Options  Liver lesion  Unilateral recurrent inguinal hernia without obstruction or gangrene  Diagnosis management comments: Present emerged from complaint of right groin pain which he states been ongoing for approximate last 2 years, he states that he was recently diagnosed with an inguinal hernia which he was to follow-up with general surgery but never did. He states is a progressively gotten worse over the last couple days, keeping him awake at night, he states decreased appetite, no nausea vomiting or fevers. CT scan of the abdomen pelvis was completed today due to worsening pain concerns for incarceration of hernia. CT scan shows numerous bone and liver lesions most concerning for a metastatic disease. Patient does have elevation of liver enzymes, with total bili of 0.9, alk phos of 3435. ALT 61, . I did review with the patient, as well as friend was present with the patient concerns for bony lesions, as well as mets to the liver. He was given a prescription for pain medication at home, and advised to contact his regular family physician for follow up, as well as given information for oncology, he was advised to contact both these providers on Monday for close follow-up and further evaluation to determine underlying etiology. Patient was informed of the importance of not ignoring these findings, as this could progress into something causing permanent disability or death. Patient understands concerns for malignancy, cancer. He was advised if he has any worsening or changes symptoms, to return to the ER for reevaluation. CRITICAL CARE TIME   Total Critical Care time was 0 minutes, excluding separately reportableprocedures. There was a high probability of clinicallysignificant/life threatening deterioration in the patient's condition which required my urgent intervention. CONSULTS:  None    PROCEDURES:  Unless otherwise noted below, none     Procedures    FINAL IMPRESSION      1.  Unilateral recurrent inguinal hernia without obstruction or gangrene    2. Liver lesion          DISPOSITION/PLAN   DISPOSITION        PATIENT REFERRED TO:  Winnie Penny MD  [id number]  [phone]    In 2 days      Jailene Richey MD  [address]  [facility] [id number]    In 3 days      Clare Flanagan MD  [address] [facility]  [phone]    In 2 days        DISCHARGE MEDICATIONS:  New Prescriptions    OXYCODONE-ACETAMINOPHEN (PERCOCET) 5-325 MG PER TABLET    Take 1 tablet by mouth every 6 hours as needed for Pain for up to 3 days. Intended supply: 3 days.  Take lowest dose possible to manage pain          (Please note that portions of this note were completed with a voice recognition program.  Efforts were made to edit the dictations but occasionally words are mis-transcribed.)    Mila Walker PA-C (electronically signed)  Attending Emergency Physician         Mila Walker PA-C  10/30/21 2274

## 2021-10-30 NOTE — ED TRIAGE NOTES
Patient reports a right inguinal hernia. States pain has worsened. States Dr Carlos A Montilla started him on etodolac. States pain has worsened. Has an appointment with Dr Neftali Davis on Thursday.

## 2021-10-30 NOTE — ED NOTES
Pt back in room from cat scan. Pt states pain is \"much better\"      Criss Clark, RN  10/30/21 8920

## 2021-11-05 NOTE — ED PROVIDER NOTES
[address] [facility] ED  eMERGENCY dEPARTMENT eNCOUnter      Pt Name: Cecilio Jones  MRN: [id number]  Rickygfdevang 1960  Date of evaluation: 11/5/2021  Provider: Neli Malone PA-C    CHIEF COMPLAINT       Chief Complaint   Patient presents with    Other     sent by Dr. Chuck Mcclendon and ultrasound for DVT         HISTORY OF PRESENT ILLNESS   (Location/Symptom, Timing/Onset,Context/Setting, Quality, Duration, Modifying Factors, Severity)  Note limiting factors. Cecilio Jones is a 59 y.o. male who presents to the emergency department patient states he was sent for an outpatient ultrasound of his right lower extremity for pain with no acute injury, he states during the ultrasound procedure, he was advised that he is positive for DVT, it was advised to come to the emergency department for admission. Patient denies any other complaints, no cough, no fevers, no shortness of breath. Patient states recently on 10/30/2021 was seen in emergency  department for abdominal pain, with diagnosis of liver lesions, concerning for metastatic disease. He is currently being evaluated by cancer center Dr. Lei Negron. Patient rates his current pain at this time is a 2 out of 10. HPI    NursingNotes were reviewed. REVIEW OF SYSTEMS    (2-9 systems for level 4, 10 or more for level 5)     Review of Systems   Constitutional: Negative for activity change and appetite change. HENT: Negative for congestion, ear discharge, ear pain, nosebleeds, rhinorrhea and sore throat. Eyes: Negative for discharge. Respiratory: Negative for shortness of breath. Cardiovascular: Negative for chest pain, palpitations and leg swelling. Gastrointestinal: Negative for abdominal distention, abdominal pain and constipation. Genitourinary: Negative for difficulty urinating and dysuria. Musculoskeletal: Negative for arthralgias. Right lower extremity pain   Skin: Negative for color change.    Neurological: Negative for dizziness, syncope, numbness and headaches. Psychiatric/Behavioral: Negative for agitation and confusion. Except as noted above the remainder of the review of systems was reviewed and negative. PAST MEDICAL HISTORY     Past Medical History:   Diagnosis Date    Chronic bilateral low back pain without sciatica 2021    Chronic bilateral low back pain without sciatica 2021    Essential hypertension     Pure hypercholesterolemia 2018         SURGICALHISTORY     No past surgical history on file. CURRENT MEDICATIONS       Discharge Medication List as of 2021  2:19 PM      CONTINUE these medications which have NOT CHANGED    Details   etodolac (LODINE) 400 MG tablet Take 400 mg by mouth 2 times dailyHistorical Med      lisinopril (PRINIVIL;ZESTRIL) 20 MG tablet Take 1 tablet by mouth daily, Disp-90 tablet, R-0Normal             ALLERGIES     Patient has no known allergies.     FAMILY HISTORY       Family History   Problem Relation Age of Onset   Bryant Solders Cancer Mother     No Known Problems Brother     Other Sister     Cancer Sister     No Known Problems Sister     No Known Problems Brother           SOCIAL HISTORY       Social History     Socioeconomic History    Marital status: Single     Spouse name: Not on file    Number of children: Not on file    Years of education: Not on file    Highest education level: Not on file   Occupational History    Not on file   Tobacco Use    Smoking status: Former Smoker     Packs/day: 1.00     Years: 10.00     Pack years: 10.00     Types: Cigarettes     Start date: 1979     Quit date: 1988     Years since quittin.8    Smokeless tobacco: Never Used   Vaping Use    Vaping Use: Never used   Substance and Sexual Activity    Alcohol use: No    Drug use: No    Sexual activity: Not on file   Other Topics Concern    Not on file   Social History Narrative    Not on file     Social Determinants of Health     Financial Resource Strain: Low Risk     Difficulty of Paying Living Expenses: Not hard at all   Food Insecurity: No Food Insecurity    Worried About Running Out of Food in the Last Year: Never true    Ran Out of Food in the Last Year: Never true   Transportation Needs: No Transportation Needs    Lack of Transportation (Medical): No    Lack of Transportation (Non-Medical): No   Physical Activity:     Days of Exercise per Week: Not on file    Minutes of Exercise per Session: Not on file   Stress:     Feeling of Stress : Not on file   Social Connections:     Frequency of Communication with Friends and Family: Not on file    Frequency of Social Gatherings with Friends and Family: Not on file    Attends Restorationist Services: Not on file    Active Member of [address] [place] [address] [employer] or Organizations: Not on file    Attends Club or Organization Meetings: Not on file    Marital Status: Not on file   Intimate Partner Violence:     Fear of Current or Ex-Partner: Not on file    Emotionally Abused: Not on file    Physically Abused: Not on file    Sexually Abused: Not on file   Housing Stability:     Unable to Pay for Housing in the Last Year: Not on file    Number of [place] in the Last Year: Not on file    Unstable Housing in the Last Year: Not on file       SCREENINGS    [place] Coma Scale  Eye Opening: Spontaneous  Best Verbal Response: Oriented  Best Motor Response: Obeys commands  [place] Coma Scale Score: 15 @FLOW([id number])@      PHYSICAL EXAM    (up to 7 for level 4, 8 or more for level 5)     ED Triage Vitals   BP Temp Temp src Pulse Resp SpO2 Height Weight   -- -- -- -- -- -- -- --       Physical Exam  Vitals and nursing note reviewed. Constitutional:       General: He is not in acute distress. Appearance: He is well-developed. He is not ill-appearing, toxic-appearing or diaphoretic. HENT:      Head: Normocephalic. Nose: No congestion.       Mouth/Throat:      Mouth: Mucous membranes are moist.      Pharynx: No oropharyngeal exudate or posterior oropharyngeal erythema. Eyes:      Extraocular Movements: Extraocular movements intact. Conjunctiva/sclera: Conjunctivae normal.      Pupils: Pupils are equal, round, and reactive to light. Neck:      Vascular: No JVD. Trachea: No tracheal deviation. Cardiovascular:      Rate and Rhythm: Normal rate. Pulses: Normal pulses. Heart sounds: Normal heart sounds. No murmur heard. No friction rub. No gallop. Pulmonary:      Effort: Pulmonary effort is normal. No tachypnea, accessory muscle usage, respiratory distress or retractions. Breath sounds: Normal breath sounds. No stridor. No wheezing, rhonchi or rales. Chest:      Chest wall: No tenderness. Abdominal:      General: Abdomen is flat. Bowel sounds are normal. There is no distension or abdominal bruit. Palpations: There is no shifting dullness, fluid wave, hepatomegaly, splenomegaly, mass or pulsatile mass. Tenderness: There is no abdominal tenderness. There is no right CVA tenderness, left CVA tenderness, guarding or rebound. Negative signs include Brand's sign, Rovsing's sign and McBurney's sign. Musculoskeletal:         General: No deformity. Cervical back: Normal range of motion and neck supple. No rigidity. Comments: Right lower extremity shows no difficult signs of edema, no erythema, negative pain on palpation, negative Homans exam.  Positive dorsalis pedis and posterior tibialis pulses. Skin:     General: Skin is warm and dry. Capillary Refill: Capillary refill takes less than 2 seconds. Coloration: Skin is not jaundiced. Neurological:      General: No focal deficit present. Mental Status: He is alert and oriented to person, place, and time. Mental status is at baseline. Cranial Nerves: No cranial nerve deficit. Sensory: No sensory deficit. Motor: No weakness.       Coordination: Coordination normal.   Psychiatric:         Mood and Affect: Mood normal. DIAGNOSTIC RESULTS     EKG: All EKG's are interpreted by the Emergency Department Physician who either signs or Co-signsthis chart in the absence of a cardiologist.        RADIOLOGY:   [place] such as CT, Ultrasound and MRI are read by the radiologist. Plain radiographic images are visualized and preliminarily interpreted by the emergency physician with the below findings:        Interpretation per the Radiologist below, if available at the time ofthis note:    No orders to display         ED BEDSIDE ULTRASOUND:   Performed by ED Physician - none    LABS:  Labs Reviewed - No data to display    All other labs were within normal range or not returned as of this dictation. EMERGENCY DEPARTMENT COURSE and DIFFERENTIAL DIAGNOSIS/MDM:   Vitals:    Vitals:    11/05/21 1245   BP: 109/66   Pulse: 94   Resp: 18   Temp: 98.5 °F (36.9 °C)   TempSrc: Oral   SpO2: 96%          MDM  Number of Diagnoses or Management Options  Acute deep vein thrombosis (DVT) of femoral vein of right lower extremity (HCC)  Diagnosis management comments: Patient presented ED after having outpatient ultrasound which showed DVT of right lower extremity. Patient was advised by his primary doctor to come to the ER to be evaluated. After evaluation patient displays no acute distress, he was given a shot of Lovenox while in ER, sent home with a prescription for Eliquis, I did contact Dr. Lanie Salas, and discussed the findings with him, he is okay with plan for patient being discharged on Eliquis, he will follow in the office in the next 48 hours. Patient was advised if he has any change or worsening symptoms, to return to the ER for reevaluation. CRITICAL CARE TIME   Total Critical Care time was 0 minutes, excluding separately reportableprocedures. There was a high probability of clinicallysignificant/life threatening deterioration in the patient's condition which required my urgent intervention. CONSULTS:  None    PROCEDURES:  Unless otherwise noted below, none     Procedures    FINAL IMPRESSION      1.  Acute deep vein thrombosis (DVT) of femoral vein of right lower extremity [facility])          DISPOSITION/PLAN   DISPOSITION Decision To Discharge 11/05/2021 02:17:20 PM      PATIENT REFERRED TO:  Troy Avalos MD  [id number]  [phone]    In 2 days        DISCHARGE MEDICATIONS:  Discharge Medication List as of 11/5/2021  2:19 PM      START taking these medications    Details   apixaban starter pack (ELIQUIS DVT/PE STARTER PACK) 5 MG TBPK tablet Take 1 tablet by mouth See Admin Instructions, Disp-74 tablet, R-0Print                (Please note that portions of this note were completed with a voice recognition program.  Efforts were made to edit the dictations but occasionally words are mis-transcribed.)    Neli Malone PA-C (electronically signed)  Attending Emergency Physician         Neli Malone PA-C  11/08/21 [facility]  11/08/21 2622

## 2021-11-05 NOTE — ED TRIAGE NOTES
Pt came after getting a ultrasound and positive for DVT  Pt states that that felt pain and noticed swelling Wednesday. Pt states no pain at this time. Pt is alert and oriented times 4.

## 2021-11-16 PROBLEM — I26.94 MULTIPLE SUBSEGMENTAL PULMONARY EMBOLI WITHOUT ACUTE COR PULMONALE (HCC): Status: ACTIVE | Noted: 2021-01-01

## 2021-11-16 PROBLEM — A41.9 SEPTICEMIA (HCC): Status: ACTIVE | Noted: 2021-01-01

## 2021-11-16 PROBLEM — U07.1 COVID-19: Status: ACTIVE | Noted: 2021-01-01

## 2021-11-16 NOTE — Clinical Note
Patient Class: Inpatient [101]   REQUIRED: Diagnosis: DWNFR-00 [[id number]]   Estimated Length of Stay: Estimated stay of more than 2 midnights   Telemetry/Cardiac Monitoring Required?: Yes

## 2021-11-16 NOTE — CARE COORDINATION
Niece Jil Kwan is here and pt would like information on POA paperwork. Contacted spiritual care and Helio Fitzpatrick to come complete paperwork. [facility] EMERGENCY [facility] [place] Case Management Initial Discharge Assessment    Met with Patient to discuss discharge plan. PCP: Sundeep Gonsales MD   2 weeks ago   DR. Shanthi Henderson, ONCOLOGIST NEW DX                            Discharge Planning    Living Arrangements: independently at home PRIOR TO RECENT DVT DX 11/5    Who do you live with? SISTER    Who helps you with your care:  self or MY NIECE Crenshaw     If lives at home:     Do you have any barriers navigating in your home? Patient can perform ADL? Yes PRIOR TO HOSPITALIZATION HAS HAD WT LOSS IN 6 MONTHS MAKING HIM WEAKER    Current Services (outpatient and in home) :  None    Dialysis: No    Is transportation available to get to your appointments? Yes PT HAS NOT BEEN DRIVING     DME Equipment:  no    Respiratory equipment: None    Respiratory provider:  no     Pharmacy:  yes - [facility]    Consult with Medication Assistance Program?        Patient agreeable to [facility] ? TBD PENDING MEDICAL COURSE    Patient agreeable to SNF/Rehab? TBD    Other discharge needs identified? TBD PALLIATIVE DISCUSSED IN ONCOLOGY NOTE    Does Patient Have a High-Risk for Readmission Diagnosis (CHF, PN, MI, COPD)? Yes COVID PNA THIS ADMISSION CM TO ADDRESS ON THE UNIT     If Yes,     Consult with pulmonologist?   Jagdish with cardiologist?   [facility]?  Consult with Pharmacy for medication assessment prior to discharge?  Consult with Behavioral health to aid in depression, anxiety, or coping issues?  Palliative Care Consult?  Pulmonary Rehab order for COPD, PN, and CHF (if EF > 35%)?  Does patient have a reliable scale and know how to read it (for CHF)?  Nutrition consult for CHF?     Respiratory therapy consult that includes bedside instruction on administration of nebulizers and/or

## 2021-11-16 NOTE — H&P
Department of Internal Medicine  General Internal Medicine  Attending History and Physical      CHIEF COMPLAINT:  Cough, sob    Reason for Admission:  Bilateral PE, COVID    History Obtained From:  patient    HISTORY OF PRESENT ILLNESS:      The patient is a 59 y.o. male with significant past medical history of HTN and recently diagnosed DVT (2 weeks ago) started on Eliquis who presents with harsh cough and sob. Per pt, symptoms started about 3 days after discharge from the ED after diagnosis of DVT. Pt states he has been taking the Eliquis. States he started feeling fatigued and weak and lost his appetite. Cough and sob worsened so he came to the ED today where CTA showed bilateral PE's with groundglass consistent with COVID pneumonia. Pt O2 was 97% on RA. COVID test came back positive. Pt received his first dose of Pfizer and was due for his second dose recently. Pt mildly hypotensive at 89/68, but denied symptoms of light headedness or dizziness. Pt was given IVF, started on Lovenox full dose and admitted for further management and hematology consult in the setting of failed outpatient treatment. Past Medical History:        Diagnosis Date    Chronic bilateral low back pain without sciatica 8/25/2021    Chronic bilateral low back pain without sciatica 8/25/2021    Essential hypertension     Pure hypercholesterolemia 1/12/2018     Past Surgical History:    History reviewed. No pertinent surgical history. Medications Prior to Admission:    Not in a hospital admission. Allergies:  Patient has no known allergies.     Social History:   Denies tobacco, etoh, drugs    Family History:       Problem Relation Age of Onset    Cancer Mother     No Known Problems Brother     Other Sister     Cancer Sister     No Known Problems Sister     No Known Problems Brother      REVIEW OF SYSTEMS:  12 point ROS was negative unless otherwise noted in the HPI   PHYSICAL EXAM:    Vitals:  BP 98/66   Pulse 106   Temp 95.7 °F (35.4 °C) (Tympanic) Comment: MALINI oral  Resp 29   Ht 5' 10\" (1.778 m)   Wt 171 lb (77.6 kg)   SpO2 93%   BMI 24.54 kg/m²     Constitutional: Awake and alert in no acute distress. Lying in bed comfortably  Head: Normocephalic, atraumatic  Eyes: EOMI, PERRLA  ENT: moist mucous membranes  Neck: neck supple, trachea midline  Lungs: Good inspiratory effort, no wheeze, no rhonchi, no rales  Heart: RRR, normal S1 and S2  GI: Soft, non-distended, non tender, no guarding, no rebound, +BS  Skin: warm, dry  Psych: appropriate affect       DATA:  CBC:   Lab Results   Component Value Date    WBC 9.3 11/16/2021    RBC 4.31 11/16/2021    HGB 12.2 11/16/2021    HCT 38.5 11/16/2021    MCV 89.3 11/16/2021    MCH 28.2 11/16/2021    MCHC 31.6 11/16/2021    RDW 17.0 11/16/2021     11/16/2021     CMP:    Lab Results   Component Value Date     11/16/2021    K 4.6 11/16/2021     11/16/2021    CO2 22 11/16/2021    BUN 21 11/16/2021    CREATININE 0.55 11/16/2021    GFRAA >60.0 11/16/2021    LABGLOM >60.0 11/16/2021    GLUCOSE 104 11/16/2021    PROT 6.2 11/16/2021    LABALBU 3.0 11/16/2021    CALCIUM 8.4 11/16/2021    BILITOT 0.5 11/16/2021    ALKPHOS 1,184 11/16/2021    AST 64 11/16/2021    ALT 47 11/16/2021     ASSESSMENT AND PLAN:      # Bilateral PE  - timing is a little uncertain as far as whether provoked by COVID or not as he was not tested 2 weeks ago when he was diagnosed with acute DVT.    - as patient was not having current respiratory symptoms, CTA was not done, so unsure whether PE's were present at time of DVT diagnosis or developed while pt was taking Eliquis  - will treat as failed outpatient treatment and start lovenox BID  - hematology consulted  - echo ordered- if abnormal, will consult cardiology    # COVID  - has cough and sob, but not hypoxic so currently no indication to treat   - if develops hypoxia, will initiate treatment with remdesivir, decadron  - cont full dose lovenox    # Hypotension  - likely due to poor PO intake as pt states he has not eaten or drank hardly anything in about a week  - IVF and monitor  - follow up echo   - hold home BP meds    DVT: full dose lovenox    Disposition: Pt with bilateral PE's. Unsure if were present prior to initiating treatment with Eliquis so will treat as failed outpatient treatment and continue lovenox BID. Hematology consulted. Echo ordered. IVF. Anticipated length of stay greater than 48 hours.     Paulette Gonzalez DO  Internal Medicine

## 2021-11-16 NOTE — ED NOTES
Assumed care of patient. Placed him on monitor. Aware we are sending him to CT shortly. No acute distress noted at this time. A & Ox4. Skin pink warm and dry.       Christel Cuevas, RN  11/16/21 1114

## 2021-11-16 NOTE — ED TRIAGE NOTES
Pt to ed from [facility] via triage with niece with concern for covid. Per niece, pt has had recent exposure. PT is poor historian and niece correcting patient. Pt reports cough ongoing for several weeks. Pt reports that his oncologist instructed them to come to ed. Niece reports new cancer diagnosis but states \"they are figuring out what to do\" Pt has missed palliative care and f/u appointments due to \"feeling sick\". Per niece, pt has appt tomorrow for \"scan of his chest\" On arrival, pt respiration even and unlabored. Skin very dry with poor turgo noted.  Dry, nonproductive cough noted

## 2021-11-16 NOTE — ED NOTES
Returned from [facility]. Placed back onto monitor. Denies any further complaints. Call light within reach. Lights dimmed. Aware we are waiting on results. States he is unable to urinate at this time. Aware we need a sample. IV infusing well.      Valarie Mohr RN  11/16/21 1780

## 2021-11-16 NOTE — ED PROVIDER NOTES
Doron 51      Pt Name: J Carlos Keith  MRN: [id number]  [place] 1960  Date of evaluation: 11/16/2021  Provider: LYUDMILA Kearns 6626       Chief Complaint   Patient presents with    Cough     since 11/6, concern for for covid, has \"chest scan tomorrow\", \"sick\"         HISTORY OF PRESENT ILLNESS   (Location/Symptom, Timing/Onset, Context/Setting, Quality, Duration, Modifying Factors, Severity)  Note limiting factors. J Carlos Keith is a 59 y.o. male who presents to the emergency department      57-year-old  male comes to the ER accompanied by family member with complaints of cough, fatigue, right-sided chest pain with cough only. Patient states his symptoms started approximately 1 week ago. His family member states the person that is living with has been sick as well. Patient reports he was discharged from this hospital 2 weeks ago after having a blood clot in his left leg. He is on blood thinners for this. They did find a spot on his liver in his lung and is being seen by oncology for this. He was due for a scan of his lung today. Nursing Notes were reviewed. REVIEW OF SYSTEMS    (2-9 systems for level 4, 10 or more for level 5)     Review of Systems   Constitutional: Positive for fatigue. Negative for chills and fever. HENT: Negative. Respiratory: Positive for cough. Negative for chest tightness, shortness of breath and wheezing. Cardiovascular: Positive for chest pain. She reports right-sided chest pain with cough only   Gastrointestinal: Negative. Musculoskeletal: Negative. Psychiatric/Behavioral: Negative. Except as noted above the remainder of the review of systems was reviewed and negative.        PAST MEDICAL HISTORY     Past Medical History:   Diagnosis Date    Chronic bilateral low back pain without sciatica 8/25/2021    Chronic bilateral low back pain without sciatica 8/25/2021  Essential hypertension     Pure hypercholesterolemia 2018         SURGICAL HISTORY     History reviewed. No pertinent surgical history. CURRENT MEDICATIONS       Previous Medications    APIXABAN STARTER PACK (ELIQUIS DVT/PE STARTER PACK) 5 MG TBPK TABLET    Take 1 tablet by mouth See Admin Instructions    ETODOLAC (LODINE) 400 MG TABLET    Take 400 mg by mouth 2 times daily    LISINOPRIL (PRINIVIL;ZESTRIL) 20 MG TABLET    Take 1 tablet by mouth daily       ALLERGIES     Patient has no known allergies. FAMILY HISTORY       Family History   Problem Relation Age of Onset   Angelica Alu Cancer Mother     No Known Problems Brother     Other Sister     Cancer Sister     No Known Problems Sister     No Known Problems Brother           SOCIAL HISTORY       Social History     Socioeconomic History    Marital status: Single     Spouse name: None    Number of children: None    Years of education: None    Highest education level: None   Occupational History    None   Tobacco Use    Smoking status: Former Smoker     Packs/day: 1.00     Years: 10.00     Pack years: 10.00     Types: Cigarettes     Start date: 1979     Quit date: 1988     Years since quittin.8    Smokeless tobacco: Never Used   Vaping Use    Vaping Use: Never used   Substance and Sexual Activity    Alcohol use: No    Drug use: No    Sexual activity: None   Other Topics Concern    None   Social History Narrative    None     Social Determinants of Health     Financial Resource Strain: Low Risk     Difficulty of Paying Living Expenses: Not hard at all   Food Insecurity: No Food Insecurity    Worried About Running Out of Food in the Last Year: Never true    Ti of Food in the Last Year: Never true   Transportation Needs: No Transportation Needs    Lack of Transportation (Medical): No    Lack of Transportation (Non-Medical):  No   Physical Activity:     Days of Exercise per Week: Not on file    Minutes of Exercise per Session: Not on file   Stress:     Feeling of Stress : Not on file   Social Connections:     Frequency of Communication with Friends and Family: Not on file    Frequency of Social Gatherings with Friends and Family: Not on file    Attends Rastafari Services: Not on file    Active Member of [address] [place] [address] or Organizations: Not on file    Attends Club or Organization Meetings: Not on file    Marital Status: Not on file   Intimate Partner Violence:     Fear of Current or Ex-Partner: Not on file    Emotionally Abused: Not on file    Physically Abused: Not on file    Sexually Abused: Not on file   Housing Stability:     Unable to Pay for Housing in the Last Year: Not on file    Number of [place] in the Last Year: Not on file    Unstable Housing in the Last Year: Not on file       SCREENINGS               PHYSICAL EXAM    (up to 7 for level 4, 8 or more for level 5)     ED Triage Vitals [11/16/21 1250]   BP Temp Temp Source Pulse Resp SpO2 Height Weight   89/68 95.7 °F (35.4 °C) Tympanic 123 16 93 % 5' 10\" (1.778 m) 171 lb (77.6 kg)       Physical Exam  Constitutional:       Comments: Appears ill, thin, fatigue   Cardiovascular:      Rate and Rhythm: Normal rate and regular rhythm. Pulmonary:      Effort: Pulmonary effort is normal.      Comments: Breath sounds are coarse throughout  Abdominal:      General: Abdomen is flat. Palpations: Abdomen is soft. Musculoskeletal:         General: Normal range of motion. Skin:     General: Skin is warm and dry. Neurological:      General: No focal deficit present. Mental Status: He is alert and oriented to person, place, and time.    Psychiatric:         Mood and Affect: Mood normal.         Behavior: Behavior normal.         DIAGNOSTIC RESULTS     EKG: All EKG's are interpreted by the Emergency Department Physician who either signs or Co-signs this chart in the absence of a cardiologist.        RADIOLOGY:   Non-plain film images such as CT, Ultrasound and MRI are read by the radiologist. Plain radiographic images are visualized and preliminarily interpreted by the emergency physician with the below findings:    EKG reveals sinus tachycardia with a ventricular rate of 109 bpm.  IA interval is 142, QRS is 86    Interpretation per the Radiologist below, if available at the time of this note:    CTA Chest W WO  (PE study)   Final Result         Bilateral acute pulmonary emboli within segmental and subsegmental branches as discussed. Multiple bilateral diffuse groundglass opacity, bilateral lungs. Findings may be seen in patients with covid 19 pneumonia. However, other infectious and inflammatory etiologies may result in a similar radiographic appearance. Diffuse permeative change, thoracic spine, right scapula, and bilateral ribs. Metastatic malignancy diagnosis of exclusion. Fracture, T5 vertebral body. Given above findings, pathologic fracture diagnosis of exclusion. All CT scans at this facility use dose modulation, iterative reconstruction, and/or weight based dosing when appropriate to reduce radiation dose to as low as reasonably achievable.                ED BEDSIDE ULTRASOUND:   Performed by ED Physician - none    LABS:  Labs Reviewed   COVID-19, RAPID - Abnormal; Notable for the following components:       Result Value    SARS-CoV-2, NAAT DETECTED (*)     All other components within normal limits    Narrative:     Don Cerna tel. [phone],  called Robi Carr, 11/16/2021 14:20, by Nia Napoles   COMPREHENSIVE METABOLIC PANEL - Abnormal; Notable for the following components:    Anion Gap 16 (*)     Glucose 104 (*)     CREATININE 0.55 (*)     Calcium 8.4 (*)     Total Protein 6.2 (*)     Albumin 3.0 (*)     Alkaline Phosphatase 1,184 (*)     ALT 47 (*)     AST 64 (*)     All other components within normal limits   CBC WITH AUTO DIFFERENTIAL - Abnormal; Notable for the following components:    RBC 4.31 (*)     Hemoglobin 12.2 (*)     Hematocrit 38.5 (*)     MCHC 31.6 (*)     RDW 17.0 (*)     Neutrophils Absolute 7.8 (*)     Lymphocytes Absolute 0.8 (*)     All other components within normal limits   LACTIC ACID, PLASMA - Abnormal; Notable for the following components:    Lactic Acid 5.5 (*)     All other components within normal limits    Narrative:     CALL  [facility]  [facility] tel. [phone],  Lactic Acid results called to and read back by Rose HERNANDEZ RN, 11/16/2021  15:36, by Ade Lebron - Abnormal; Notable for the following components:    Protime 17.8 (*)     All other components within normal limits   POCT CREATININE - URINE - Normal   CULTURE, BLOOD 1   CULTURE, BLOOD 2   MAGNESIUM   TROPONIN   APTT   URINALYSIS       All other labs were within normal range or not returned as of this dictation. EMERGENCY DEPARTMENT COURSE and DIFFERENTIAL DIAGNOSIS/MDM:   Vitals:    Vitals:    11/16/21 1250 11/16/21 1500 11/16/21 1530 11/16/21 1600   BP: 89/68 101/61 102/69 105/65   Pulse: 123 105 107 107   Resp: 16 18 24 (!) 32   Temp: 95.7 °F (35.4 °C)      TempSrc: Tympanic      SpO2: 93% 95% 97% 93%   Weight: 171 lb (77.6 kg)      Height: 5' 10\" (1.778 m)              MDM  Number of Diagnoses or Management Options  COVID-19  Diagnosis management comments: Case reviewed with Dr. Milian [facility], ED attending. Rapid Covid is positive  Case reviewed with Dr. CRABTREE [facility], hospitalist.  [facility] agrees with admission regarding pulmonary embolism refractory to Eliquis treatment, as well as Covid. REASSESSMENT            PROCEDURES:  Unless otherwise noted below, none     Procedures      FINAL IMPRESSION      1. COVID-19    2. Multiple subsegmental pulmonary emboli without acute cor pulmonale (HCC)    3. Septicemia [facility])          DISPOSITION/PLAN   DISPOSITION Decision To Admit 11/16/2021 04:29:14 PM      PATIENT REFERRED TO:  No follow-up provider specified.     DISCHARGE MEDICATIONS:  New Prescriptions    No medications on file     Controlled Substances Monitoring:     RX Monitoring 1/8/2018   Attestation The Prescription Monitoring Report for this patient was reviewed today.        (Please note that portions of this note were completed with a voice recognition program.  Efforts were made to edit the dictations but occasionally words are mis-transcribed.)    Stanley Hatch 484, APRN - CNP (electronically signed)  Attending Emergency Physician         Heloise Alpers, LYUDMILA - CNP  11/16/21 LYUDMILA Barton CNP  11/16/21 2023

## 2021-11-16 NOTE — CARE COORDINATION
[facility] inquiring if he intended to just treat with lovenox bid or if he wanted heparin in case pt does not get a bed and his stay is extended down in ER. He informed me he just wants to do the lovenox bid. Relayed to Sameer Wen RN who is caring for the pt to pass on to oncoming shift.  Electronically signed by Bryce Gamboa RN on 11/16/2021 at 5:58 PM

## 2021-11-17 NOTE — CARE COORDINATION
INTERDISCIPLINARY ROUNDING    November 17, 2021    Anticipated Discharge Date: TBD      Anticipated Discharge Disposition: Tentatively home w/ family, will need home O2. Patient Mobility or PT/OT ordered: PT/OT evals done today - recommend HHC. Readmission Risk              Risk of Unplanned Readmission:  9         Discussed patient goal for the day, patient clinical progression, and barriers to discharge. The following Goal(s) of the Day/Commitment(s) have been identified:  Outcomes Documented in Discipline-specific Documentation. Updates / Report:      - Patient remains on room air - SPO2 92-96%. - Does have dyspnea on exertion and tachycardia per RN.   - CT chest showed bialt pulmonary emboli - on Eliquis and Lovenox. - Per onco note today - liver and bone lesions suspicious for malignancy.   - PT/OT evals were completed today - recommend HHC. - First COVID diagnosis on 11/16.   - DC plan - home with [facility] referral if patient is agreeable.       Isrrael Khan RN  November 17, 2021

## 2021-11-17 NOTE — PROGRESS NOTES
Patient transported to room A&Ox4 VSS. No complain of pain at this time. RN completes admission questions with patient. 20G PIV infusing continuous 0.9% sodium chloride @ 100ml/hr. Skin CDI expect appears flushed. Patient oriented to room and provided with water and ginger ale. Call light placed within reach. RN will continue to monitor.

## 2021-11-17 NOTE — CONSULTS
Hematology/Oncology Consult  Encounter Date: 2021 10:00 AM    Mr. Sana Richardson is a 59 y.o. male  : 1960  MRN: [id number]  Acct Number: [de-identified]  Requesting Provider: dr CRABTREE [facility]   Reason for request: pulmonary embolism     CONSULTANT: Chuck Guevara MD    HPI: Willy Frias was admitted for pulmonary embolism and positive Covid pneumonia. Seen earlier in ER and diagnosed with right leg DVT and started on Eliquis. Few days later developed dyspnea and pulmonary embolism. On Lovenox. Also had recent ct scan of abdomen and showed liver lesions and bone lesions suspicious for malignancy. LDH is elevated .     Patient Active Problem List   Diagnosis    Benign prostatic hyperplasia with weak urinary stream    Non-recurrent unilateral inguinal hernia without obstruction or gangrene    Pure hypercholesterolemia    Right inguinal hernia    Essential hypertension    Unintentional weight loss    Chronic bilateral low back pain without sciatica    COVID-19    Multiple subsegmental pulmonary emboli without acute cor pulmonale (HCC)    Septicemia (HCC)     Past Medical History:   Diagnosis Date    Chronic bilateral low back pain without sciatica 2021    Chronic bilateral low back pain without sciatica 2021    Essential hypertension     Pure hypercholesterolemia 2018     @PSH@  Family History   Problem Relation Age of Onset    Cancer Mother     No Known Problems Brother     Other Sister     Cancer Sister     No Known Problems Sister     No Known Problems Brother      Social History     Socioeconomic History    Marital status: Single     Spouse name: Not on file    Number of children: Not on file    Years of education: Not on file    Highest education level: Not on file   Occupational History    Not on file   Tobacco Use    Smoking status: Former Smoker     Packs/day: 1.00     Years: 10.00     Pack years: 10.00     Types: Cigarettes     Start date: 1979     Quit date: CARDIAC: Regular rate and rhythm, without murmurs, rubs or gallops. ABDOMINAL: Normal bowel soundspresent, soft, non-tender, no mass or  organomegaly.   MUSKL:  LAB RESULTS:  Recent Results (from the past 24 hour(s))   Urinalysis    Collection Time: 11/16/21  1:30 PM   Result Value Ref Range    Color, UA Yellow Straw/Yellow    Clarity, UA Clear Clear    Glucose, Ur Negative Negative mg/dL    Bilirubin Urine Negative Negative    Ketones, Urine Negative Negative mg/dL    Specific Gravity, UA 1.082 1.005 - 1.030    Blood, Urine Negative Negative    pH, UA 7.0 5.0 - 9.0    Protein, UA Negative Negative mg/dL    Urobilinogen, Urine 1.0 <2.0 E.U./dL    Nitrite, Urine Negative Negative    Leukocyte Esterase, Urine Negative Negative   EKG 12 Lead - Chest Pain    Collection Time: 11/16/21  1:36 PM   Result Value Ref Range    Ventricular Rate 109 BPM    Atrial Rate 109 BPM    P-R Interval 142 ms    QRS Duration 86 ms    Q-T Interval 320 ms    QTc Calculation (Bazett) 430 ms    P Axis 63 degrees    R Axis 37 degrees    T Axis 43 degrees   COVID-19, Rapid    Collection Time: 11/16/21  2:06 PM    Specimen: Nasopharyngeal Swab   Result Value Ref Range    SARS-CoV-2, NAAT DETECTED (A) Not Detected   POCT CREATININE    Collection Time: 11/16/21  2:46 PM   Result Value Ref Range    POC CREATININE WHOLE BLOOD 0.4    Comprehensive Metabolic Panel    Collection Time: 11/16/21  2:48 PM   Result Value Ref Range    Sodium 139 135 - 144 mEq/L    Potassium 4.6 3.4 - 4.9 mEq/L    Chloride 101 95 - 107 mEq/L    CO2 22 20 - 31 mEq/L    Anion Gap 16 (H) 9 - 15 mEq/L    Glucose 104 (H) 70 - 99 mg/dL    BUN 21 8 - 23 mg/dL    CREATININE 0.55 (L) 0.70 - 1.20 mg/dL    GFR Non-African American >60.0 >60    GFR  >60.0 >60    Calcium 8.4 (L) 8.5 - 9.9 mg/dL    Total Protein 6.2 (L) 6.3 - 8.0 g/dL    Albumin 3.0 (L) 3.5 - 4.6 g/dL    Total Bilirubin 0.5 0.2 - 0.7 mg/dL    Alkaline Phosphatase 1,184 (H) 35 - 104 U/L    ALT 47 (H) 0 - 41 U/L    AST 64 (H) 0 - 40 U/L    Globulin 3.2 2.3 - 3.5 g/dL   CBC Auto Differential    Collection Time: 11/16/21  2:48 PM   Result Value Ref Range    WBC 9.3 4.8 - 10.8 K/uL    RBC 4.31 (L) 4.70 - 6.10 M/uL    Hemoglobin 12.2 (L) 14.0 - 18.0 g/dL    Hematocrit 38.5 (L) 42.0 - 52.0 %    MCV 89.3 80.0 - 100.0 fL    MCH 28.2 27.0 - 31.3 pg    MCHC 31.6 (L) 33.0 - 37.0 %    RDW 17.0 (H) 11.5 - 14.5 %    Platelets 680 127 - 131 K/uL    Neutrophils % 84.1 %    Lymphocytes % 9.1 %    Monocytes % 6.4 %    Eosinophils % 0.2 %    Basophils % 0.2 %    Neutrophils Absolute 7.8 (H) 1.4 - 6.5 K/uL    Lymphocytes Absolute 0.8 (L) 1.0 - 4.8 K/uL    Monocytes Absolute 0.6 0.2 - 0.8 K/uL    Eosinophils Absolute 0.0 0.0 - 0.7 K/uL    Basophils Absolute 0.0 0.0 - 0.2 K/uL   Lactic Acid, Plasma    Collection Time: 11/16/21  2:48 PM   Result Value Ref Range    Lactic Acid 5.5 (HH) 0.5 - 2.2 mmol/L   Magnesium    Collection Time: 11/16/21  2:48 PM   Result Value Ref Range    Magnesium 2.4 1.7 - 2.4 mg/dL   Troponin    Collection Time: 11/16/21  2:48 PM   Result Value Ref Range    Troponin <0.010 0.000 - 0.010 ng/mL   APTT    Collection Time: 11/16/21  2:48 PM   Result Value Ref Range    aPTT 29.5 24.4 - 36.8 sec   Protime-INR    Collection Time: 11/16/21  2:48 PM   Result Value Ref Range    Protime 17.8 (H) 12.3 - 14.9 sec    INR 1.5    Lactate Dehydrogenase    Collection Time: 11/16/21 11:12 PM   Result Value Ref Range     (H) 135 - 225 U/L   Procalcitonin    Collection Time: 11/16/21 11:12 PM   Result Value Ref Range    Procalcitonin 0.11 0.00 - 0.15 ng/mL   Fibrinogen    Collection Time: 11/17/21  5:27 AM   Result Value Ref Range    Fibrinogen 298.0 235.0 - 507.0 mg/dL   CBC Auto Differential    Collection Time: 11/17/21  5:27 AM   Result Value Ref Range    WBC 7.3 4.8 - 10.8 K/uL    RBC 3.81 (L) 4.70 - 6.10 M/uL    Hemoglobin 10.6 (L) 14.0 - 18.0 g/dL    Hematocrit 33.4 (L) 42.0 - 52.0 %    MCV 87.6 80.0 - 100.0 fL    MCH pulmonary arteries. Thick section coronal MIP 3D reconstructions were performed  on a separate workstation. COMPARISON:none FINDINGS:  Pulmonary arteries: Intraluminal filling defects segmental and subsegmental branches left pulmonary artery, left upper and left lower lobe. Intraluminal filling defects, subsegmental branches right lower lobe. Thoracic aorta: Normal in course and caliber. Cardiac Size: Normal. Pericardial effusion: None. Right lung: Multiple, diffuse, peripheral groundglass opacities, right lung. Pleural-based noncalcified 1.4 x 1.7 cm consolidation, right lower lobe. Left lung: Multiple diffuse peripheral groundglass opacities left lung, greatest in left lower lobe. Small left effusion. No nodules, masses. Lymph nodes: No hilar, mediastinal, or axillary lymph node enlargement. Upper abdomen:Limited imaging upper abdomen shows no gross anomaly. Musculoskeletal: Compression fracture, T5. Diffuse permeative change, thoracic spine, sternum, right scapula and bilateral ribs. Bilateral acute pulmonary emboli within segmental and subsegmental branches as discussed. Multiple bilateral diffuse groundglass opacity, bilateral lungs. Findings may be seen in patients with covid 19 pneumonia. However, other infectious and inflammatory etiologies may result in a similar radiographic appearance. Diffuse permeative change, thoracic spine, right scapula, and bilateral ribs. Metastatic malignancy diagnosis of exclusion. Fracture, T5 vertebral body. Given above findings, pathologic fracture diagnosis of exclusion. All CT scans at this facility use dose modulation, iterative reconstruction, and/or weight based dosing when appropriate to reduce radiation dose to as low as reasonably achievable.      CT ABDOMEN PELVIS W IV CONTRAST Additional Contrast? None    Result Date: 10/30/2021  EXAM:  CT ABDOMEN PELVIS W IV CONTRAST History: Right groin pain Technique: Multiple contiguous axial images were obtained of the abdomen and pelvis from the level of the lung bases through the ischial tuberosities with IV contrast. Multiplanar reformats were obtained. Comparison: CT abdomen pelvis October 2, 2021 Findings: Lung bases are clear. Multiple hypodense lesions of the liver again identified and not significantly changed, the largest of which measures approximately 2.2 cm. The spleen, stomach, pancreas, gallbladder, and adrenal glands are within normal limits. The kidneys enhance uniformly. Bilateral parapelvic cysts again identified. No urinary tract calculi or hydronephrosis. Urinary bladder is well distended. The prostate is enlarged. Abdominal aorta is nonaneurysmal  and demonstrates atherosclerotic calcification . No retroperitoneal or abdominal/pelvic lymphadenopathy. No small bowel obstruction. No overt colonic mass or pericolonic inflammation. Appendix is within normal limits. No free fluid, loculated fluid collection, or pneumoperitoneum. Large fat-containing indirect right inguinal hernia is again identified and not significant changed from prior examination. Heterogenous sclerosis throughout the right and left iliac bones and sacrum, heterogenous sclerosis within multiple lower thoracic and lumbar vertebrae findings most concerning for metastatic disease. There is compression deformity of L3 with loss of height of approximately 40% but no retropulsion concerning for pathologic fracture. Mild compression deformity of T9 without retropulsion also concerning for pathologic compression fracture. Numerous bone and liver lesions as detailed most concerning for metastatic disease. No significant interval change of a large fat-containing right inguinal hernia. All CT scans at this facility use dose modulation, iterative reconstruction, and/or weight based dosing when appropriate to reduce radiation dose to as low as reasonably achievable.     US DUP LOWER EXTREMITY RIGHT PREETI    Result Date: 11/5/2021  EXAMINATION: US DUP LOWER

## 2021-11-17 NOTE — PROGRESS NOTES
0945 - assessment complete, pt A&Ox4. Dyspneic on exertion, lung sounds diminished bilaterally, on room air. Pt on telemetry, sinus tachy. Pt denies pain or further needs. In bed with call light in reach. 56 - niece (POA) updated on patient.

## 2021-11-17 NOTE — PROGRESS NOTES
Department of Internal Medicine  General Internal Medicine  Attending Progress Note      SUBJECTIVE:  Pt seen and examined. No complaints. Breathing slightly better today. Less cough. OBJECTIVE      Medications    Current Facility-Administered Medications: sodium chloride flush 0.9 % injection 5-40 mL, 5-40 mL, IntraVENous, 2 times per day  sodium chloride flush 0.9 % injection 5-40 mL, 5-40 mL, IntraVENous, PRN  0.9 % sodium chloride infusion, 25 mL, IntraVENous, PRN  enoxaparin (LOVENOX) injection 80 mg, 1 mg/kg, SubCUTAneous, BID  ondansetron (ZOFRAN-ODT) disintegrating tablet 4 mg, 4 mg, Oral, Q8H PRN **OR** ondansetron (ZOFRAN) injection 4 mg, 4 mg, IntraVENous, Q6H PRN  polyethylene glycol (GLYCOLAX) packet 17 g, 17 g, Oral, Daily PRN  acetaminophen (TYLENOL) tablet 650 mg, 650 mg, Oral, Q6H PRN **OR** acetaminophen (TYLENOL) suppository 650 mg, 650 mg, Rectal, Q6H PRN  guaiFENesin-dextromethorphan (ROBITUSSIN DM) 100-10 MG/5ML syrup 5 mL, 5 mL, Oral, Q4H PRN  Vitamin D (CHOLECALCIFEROL) tablet 2,000 Units, 2,000 Units, Oral, Daily  0.9 % sodium chloride infusion, , IntraVENous, Continuous  Physical    VITALS:  /88   Pulse 104   Temp 98.1 °F (36.7 °C) (Oral)   Resp 18   Ht 5' 10\" (1.778 m)   Wt 171 lb (77.6 kg)   SpO2 92%   BMI 24.54 kg/m²   Constitutional: Awake and alert in no acute distress.  Lying in bed comfortably  Head: Normocephalic, atraumatic  Eyes: EOMI, PERRLA  ENT: moist mucous membranes  Neck: neck supple, trachea midline  Lungs: Good inspiratory effort, no wheeze, no rhonchi, no rales  Heart: RRR, normal S1 and S2  GI: Soft, non-distended, non tender, no guarding, no rebound, +BS  Skin: warm, dry  Psych: appropriate affect  Data    CBC:   Lab Results   Component Value Date    WBC 7.3 11/17/2021    RBC 3.81 11/17/2021    HGB 10.6 11/17/2021    HCT 33.4 11/17/2021    MCV 87.6 11/17/2021    MCH 27.9 11/17/2021    MCHC 31.9 11/17/2021    RDW 17.5 11/17/2021     11/17/2021 CMP:    Lab Results   Component Value Date     11/17/2021    K 3.9 11/17/2021     11/17/2021    CO2 21 11/17/2021    BUN 16 11/17/2021    CREATININE 0.41 11/17/2021    GFRAA >60.0 11/17/2021    LABGLOM >60.0 11/17/2021    GLUCOSE 85 11/17/2021    PROT 5.2 11/17/2021    LABALBU 2.4 11/17/2021    CALCIUM 8.1 11/17/2021    BILITOT 0.5 11/17/2021    ALKPHOS 1,190 11/17/2021    AST 50 11/17/2021    ALT 36 11/17/2021       ASSESSMENT AND PLAN         # Bilateral PE  - timing is a little uncertain as far as whether provoked by COVID or not as he was not tested 2 weeks ago when he was diagnosed with acute DVT. - as patient was not having current respiratory symptoms, CTA was not done, so unsure whether PE's were present at time of DVT diagnosis or developed while pt was taking Eliquis  - will treat as failed outpatient treatment and start lovenox BID  - hematology consulted- malignant workup in progress as pt with bone and liver lesions which are likely metastatic  - echo ordered- if abnormal, will consult cardiology     # COVID  - has cough and sob, but not hypoxic so currently no indication to treat   - if develops hypoxia, will initiate treatment with remdesivir, decadron  - cont full dose lovenox     # Hypotension- resolved with IVF  - likely due to poor PO intake as pt states he has not eaten or drank hardly anything in about a week  - cont IVF as patient still with poor appetite  - follow up echo   - hold home BP meds- will resume if needed     DVT: full dose lovenox     Disposition: Pt with bilateral PE's. Unsure if were present prior to initiating treatment with Eliquis so will treat as failed outpatient treatment and continue lovenox BID. Hematology consulted. Awaiting malignancy workup due to likely metastatic lesions to the liver and bone.  Hypercoagulable workup pending      Stephanie Jacobs DO  Internal Medicine

## 2021-11-17 NOTE — PLAN OF CARE
Problem: Airway Clearance - Ineffective  Goal: Achieve or maintain patent airway  11/17/2021 0948 by Tess Westbrook RN  Outcome: Ongoing  11/17/2021 0143 by Nan Vasquez RN  Outcome: Ongoing     Problem: Gas Exchange - Impaired  Goal: Absence of hypoxia  11/17/2021 0948 by Tess Westbrook RN  Outcome: Ongoing  11/17/2021 0143 by Nan Vasquez RN  Outcome: Ongoing  Goal: Promote optimal lung function  11/17/2021 0948 by Tess Westbrook RN  Outcome: Ongoing  11/17/2021 0143 by Nan Vasquez RN  Outcome: Ongoing     Problem: Breathing Pattern - Ineffective  Goal: Ability to achieve and maintain a regular respiratory rate  11/17/2021 0948 by Tess Westbrook RN  Outcome: Ongoing  11/17/2021 0143 by Nan Vasquez RN  Outcome: Ongoing     Problem:  Body Temperature -  Risk of, Imbalanced  Goal: Ability to maintain a body temperature within defined limits  11/17/2021 0948 by Tess Westbrook RN  Outcome: Ongoing  11/17/2021 0143 by Nan Vasquez RN  Outcome: Ongoing  Goal: Will regain or maintain usual level of consciousness  11/17/2021 0948 by Tess Westbrook RN  Outcome: Ongoing  11/17/2021 0143 by Nan Vasquez RN  Outcome: Ongoing  Goal: Complications related to the disease process, condition or treatment will be avoided or minimized  11/17/2021 0948 by Tess Westbrook RN  Outcome: Ongoing  11/17/2021 0143 by Nan Vasquez RN  Outcome: Ongoing     Problem: Isolation Precautions - Risk of Spread of Infection  Goal: Prevent transmission of infection  11/17/2021 0948 by Tess Westbrook RN  Outcome: Ongoing  11/17/2021 0143 by Nan Vasquez RN  Outcome: Ongoing     Problem: Nutrition Deficits  Goal: Optimize nutritional status  11/17/2021 0948 by Tess Westbrook RN  Outcome: Ongoing  11/17/2021 0143 by Nan Vasquez RN  Outcome: Ongoing     Problem: Risk for Fluid Volume Deficit  Goal: Maintain normal heart rhythm  11/17/2021 0948 by Tess Westbrook RN  Outcome: Ongoing  11/17/2021 0143 by Suze Mera Juliette Romano RN  Outcome: Ongoing  Goal: Maintain absence of muscle cramping  11/17/2021 0948 by Paula Diaz RN  Outcome: Ongoing  11/17/2021 0143 by Jamie Bocanegra RN  Outcome: Ongoing  Goal: Maintain normal serum potassium, sodium, calcium, phosphorus, and pH  11/17/2021 0948 by Paula Diaz RN  Outcome: Ongoing  11/17/2021 0143 by Jamie Bocanegra RN  Outcome: Ongoing     Problem: Loneliness or Risk for Loneliness  Goal: Demonstrate positive use of time alone when socialization is not possible  11/17/2021 0948 by Paula Diaz RN  Outcome: Ongoing  11/17/2021 0143 by Jamie Bocanegra RN  Outcome: Ongoing     Problem: Fatigue  Goal: Verbalize increase energy and improved vitality  11/17/2021 0948 by Paula Diaz RN  Outcome: Ongoing  11/17/2021 0143 by Jamie Bocanegra RN  Outcome: Ongoing     Problem: Patient Education: Go to Patient Education Activity  Goal: Patient/Family Education  11/17/2021 3632 by Paula Diaz RN  Outcome: Ongoing  11/17/2021 0143 by Jamie Bocanegra RN  Outcome: Ongoing

## 2021-11-17 NOTE — ACP (ADVANCE CARE PLANNING)
Advance Care Planning     Advance Care Planning Inpatient Note  [facility] Department    Today's Date: 11/16/2021  Unit: [facility] ED    Received request from IDT Member. Upon review of chart and communication with care team, patient's decision making abilities are not in question. . Patient was/were present in the room during visit. Goals of ACP Conversation:  Discuss advance care planning documents    Health Care Decision Makers:       Primary Decision Maker: Kely Arellano - [facility] - [phone]    Summary:  Completed New Documents    Advance Care Planning Documents (Patient Wishes):  Healthcare Power of /Advance Directive Appointment of Health Care Agent     Assessment:  Patient has no other family and wanted to make his niece his agent. Interventions:  Assisted in the completion of documents according to patient's wishes at this time    Care Preferences Communicated:   No    Outcomes/Plan:  New advance directive completed.     Electronically signed by Natalya Farooq [address] [place] [address] on 11/16/2021 at 6:56 PM
attempt to resuscitate) or would you prefer a natural death (answer \"no\" for do not attempt to resuscitate)? \" yes       [x] Yes   [] No   Educated Patient / [employer] regarding differences between Advance Directives and portable DNR orders.     Length of ACP Conversation in minutes:  15    Conversation Outcomes:  [x] ACP discussion completed  [] Existing advance directive reviewed with patient; no changes to patient's previously recorded wishes  [] New Advance Directive completed  [] Portable Do Not Rescitate prepared for Provider review and signature  [] POLST/POST/MOLST/MOST prepared for Provider review and signature      Follow-up plan:    [] Schedule follow-up conversation to continue planning  [] Referred individual to Provider for additional questions/concerns   [] Advised patient/agent/surrogate to review completed ACP document and update if needed with changes in condition, patient preferences or care setting    [x] This note routed to one or more involved healthcare providers

## 2021-11-17 NOTE — PROGRESS NOTES
[facility] OCC[facility]APY EVALUATION - ACUTE     NAME: Cordie Riedel  : 1960 (59 y.o.)  MRN: [id number]  CODE STATUS: Full Code  Room: N925/U513-31    Date of Service: 2021    Patient Diagnosis(es): Septicemia [facility]) [A41.9]  Multiple subsegmental pulmonary emboli without acute cor pulmonale ([address] [I26.94]  COVID-19 [U07.1]   Chief Complaint   Patient presents with    Cough     since , concern for for covid, has \"chest scan tomorrow\", \"sick\"     Patient Active Problem List    Diagnosis Date Noted    COVID-19 2021    Multiple subsegmental pulmonary emboli without acute cor pulmonale ([address] 2021    Septicemia ([address] 2021    Unintentional weight loss 2021    Chronic bilateral low back pain without sciatica 2021    Essential hypertension 2018    Right inguinal hernia 2018    Pure hypercholesterolemia 2018    Benign prostatic hyperplasia with weak urinary stream 2018    Non-recurrent unilateral inguinal hernia without obstruction or gangrene 2018        Past Medical History:   Diagnosis Date    Chronic bilateral low back pain without sciatica 2021    Chronic bilateral low back pain without sciatica 2021    Essential hypertension     Pure hypercholesterolemia 2018     History reviewed. No pertinent surgical history.      Restrictions  Restrictions/Precautions: Up as Tolerated, Isolation (droplet, + Covid 19)     Safety Devices: Safety Devices  Safety Devices in place: Not Applicable        Subjective       Pain Reassessment:   Pain Assessment  Patient Currently in Pain: Denies       Prior Level of Function:  Social/Functional History  Lives With:  (sister)  Type of Home: House  Home Layout: Two level, Able to Live on Main level with bedroom/bathroom, Laundry in basement (10 steps to basement with hand rail,)  Home Access: Stairs to enter without rails  Entrance Stairs - Number of Steps: 2  Bathroom Shower/Tub: Tub/Shower unit  ADL Assistance: Independent  Homemaking Assistance: Independent (shares with sister)  Ambulation Assistance: Independent  Transfer Assistance: Independent  Active : Yes  Occupation: Retired  Type of occupation:   Leisure & Hobbies: travel    OBJECTIVE:     Orientation Status:  Orientation  Overall Orientation Status: Within Functional Limits    Observation:  Observation/Palpation  Observation: 94% on RA prior to activity    Cognition Status:  Cognition  Overall Cognitive Status: WFL    Perception Status:  Perception  Overall Perceptual Status: WFL    Sensation Status:  Sensation  Overall Sensation Status: WFL    Vision and Hearing Status:  Vision  Vision: Impaired  Vision Exceptions: Wears glasses for reading  Hearing  Hearing: Within functional limits     ROM:   LUE AROM (degrees)  LUE AROM : WFL  Left Hand AROM (degrees)  Left Hand AROM: WFL  RUE AROM (degrees)  RUE AROM : WFL  Right Hand AROM (degrees)  Right Hand AROM: WFL    Strength:  LUE Strength  L Hand General: 4-/5  LUE Strength Comment: 4-/5 grossly assessed  RUE Strength  R Hand General: 4-/5  RUE Strength Comment: 4-/5 grossly assessed    Coordination, Tone, Quality of Movement:    Tone RUE  RUE Tone: Normotonic  Tone LUE  LUE Tone: Normotonic  Coordination  Movements Are Fluid And Coordinated: No  Coordination and Movement description: Decreased speed, Right UE, Left UE    Hand Dominance:  Hand Dominance  Hand Dominance: Right    ADL Status:  ADL  Feeding: Setup  Grooming: Setup  UE Bathing: Setup  LE Bathing: Stand by assistance  UE Dressing: Stand by assistance  LE Dressing: Stand by assistance  Toileting: Stand by assistance  Additional Comments: ADL''s simulated, pt declined to doff socks but completed dynamic sitting test to touch therapist hand outside of MO without LOB          Therapy key for assistance levels -   Independent = Pt. is able to perform task with no assistance but may require a device   Stand by Little  How much help for Toileting?: A Little  How much help for putting on and taking off regular upper body clothing?: A Little  How much help for taking care of personal grooming?: A Little  How much help for eating meals?: A Little  AM-PAC Inpatient Daily Activity Raw Score: 18  AM-PAC Inpatient ADL T-Scale Score : 38.66  ADL Inpatient CMS 0-100% Score: 46.65    Plan:  Plan  Times per week: 1-3  Plan weeks: LOS  Current Treatment Recommendations: Strengthening, Functional Mobility Training, Endurance Training, Patient/Caregiver Education & Training, Home Management Training    Goals:   Patient will:    - Be Supervised in UB ADLs   - Be Supervised in LB ADLs  - Be Supervised in ADL transfers without LOB  - Be Supervised  in toileting tasks  - Improve B UE strength and endurance to increase by 1/2 MMT grade in order to participate in self-care activities as projected. Patient Goal:    home       Therapy Time:   OT Individual Minutes  Time In: 4585  Time Out: 1053  Minutes: 12    Eval: 12 minutes     Electronically signed by:     Lacie Castaneda OTR/L, OTR/L  11/17/2021, 11:27 AM

## 2021-11-17 NOTE — PROGRESS NOTES
See OT evaluation for all goals and OT POC.  Electronically signed by FERMIN Barajas/L on 11/17/2021 at 11:28 AM

## 2021-11-17 NOTE — PROGRESS NOTES
Physical Therapy Med Surg Initial Assessment  Facility/Department: [facility]  Room: -58     NAME: J Carlos Keith  : 1960 (59 y.o.)  MRN: [id number]  CODE STATUS: Full Code    Date of Service: 2021    Patient Diagnosis(es): Septicemia [facility]) [A41.9]  Multiple subsegmental pulmonary emboli without acute cor pulmonale ([address] [I26.94]  COVID-19 [U07.1]   Chief Complaint   Patient presents with    Cough     since , concern for for covid, has \"chest scan tomorrow\", \"sick\"     Patient Active Problem List    Diagnosis Date Noted    COVID-19 2021    Multiple subsegmental pulmonary emboli without acute cor pulmonale ([address] 2021    Septicemia ([address] 2021    Unintentional weight loss 2021    Chronic bilateral low back pain without sciatica 2021    Essential hypertension 2018    Right inguinal hernia 2018    Pure hypercholesterolemia 2018    Benign prostatic hyperplasia with weak urinary stream 2018    Non-recurrent unilateral inguinal hernia without obstruction or gangrene 2018      Past Medical History:   Diagnosis Date    Chronic bilateral low back pain without sciatica 2021    Chronic bilateral low back pain without sciatica 2021    Essential hypertension     Pure hypercholesterolemia 2018     History reviewed. No pertinent surgical history.     Chart Reviewed: Yes  Patient assessed for rehabilitation services?: Yes  Family / Caregiver Present: No    Restrictions:  Restrictions/Precautions: Up as Tolerated     SUBJECTIVE: Subjective: Pt denies pain currently    Pain  Pre Treatment Pain Screening  Pain at present: 0    Post Treatment Pain Screening:   Pain Screening  Patient Currently in Pain: No  Pain Assessment  Pain Assessment: 0-10  Pain Level: 0    Prior Level of Function:  Social/Functional History  Lives With:  (sister)  Type of Home: House  Home Layout: Two level, Able to Live on Main level with bedroom/bathroom, Laundry in basement (10 steps to basement with hand rail,)  Home Access: Stairs to enter without rails  Entrance Stairs - Number of Steps: 2  Bathroom Shower/Tub: Tub/Shower unit  ADL Assistance: Independent  Homemaking Assistance: Independent (shares with sister)  Ambulation Assistance: Independent  Transfer Assistance: Independent  Active : Yes  Occupation: Retired  Type of occupation:   Leisure & Hobbies: travel    OBJECTIVE:   Vision: Impaired  Vision Exceptions: Wears glasses for reading  Hearing: Within functional limits    Cognition:  Overall Orientation Status: Within Functional Limits  Follows Commands: Within Functional Limits     ROM:  RLE AROM: WFL  LLE AROM : WFL    Strength:  Strength RLE  Comment: functionally >/=3+/5  Strength LLE  Comment: functionally >/=3+/5    Neuro:  Balance  Sitting - Static: Good  Sitting - Dynamic: Good  Standing - Static: Fair; +  Standing - Dynamic: Fair; +     Tone RLE  RLE Tone: Normotonic  Tone LLE  LLE Tone: Normotonic  Motor Control  Gross Motor?: WFL  Sensation  Overall Sensation Status: WFL    Bed mobility  Supine to Sit: Modified independent  Sit to Supine: Modified independent    Transfers  Sit to Stand: Supervision  Stand to sit: Supervision    Ambulation  Ambulation?: Yes  Ambulation 1  Surface: level tile  Device: No Device  Assistance: Stand by assistance  Quality of Gait: decreased gait speed  Gait Deviations: Slow Araceli; Decreased step length  Distance: 40ft  Comments: with turns, limited to in room environment d/t COVID-19 droplet + isolation, pt fatigues quickly. [id number]%     Activity Tolerance  Activity Tolerance: Patient Tolerated treatment well    PT Education  PT Education: Goals; Plan of Care; PT Role; General Safety    ASSESSMENT:   Body structures, Functions, Activity limitations: Decreased functional mobility ;  Decreased safe awareness; Decreased balance; Decreased strength  Decision Making: Medium Complexity  History: med  Exam: med  Clinical Presentation: med    Prognosis: Good  Barriers to Learning: none    DISCHARGE RECOMMENDATIONS:  Discharge Recommendations: Continue to assess pending progress  Assessment: Pt demonstrates the above deficits and decline in functional mobility status placing them at increased risk for falls. Pt would benefit from physical therapy to address above deficits and allow for safe return home at highest level of function, decrease risk for falls, and improve QOL. REQUIRES PT FOLLOW UP: Yes      PLAN OF CARE:  Plan  Times per week: 3-6  Current Treatment Recommendations: Balance Training, Strengthening, Transfer Training, Endurance Training, Gait Training, Neuromuscular Re-education, Home Exercise Program, Patient/Caregiver Education & Training, Equipment Evaluation, Education, & procurement, Safety Education & Training, Stair training, Functional Mobility Training  Safety Devices  Type of devices: Left in bed, Call light within reach    Goals:  Patient goals : \"go home\"  Long term goals  Long term goal 1: Functional transfers with indep  Long term goal 2: Amb 50ft with indep  Long term goal 3: 5xSTS <13.0 seconds to demonstrate  decreased fall risk  Long term goal 4: marching in place x 12 reps with supervision to simulate safe stair negotiation    [facility] (6 CLICK) [address] Raw Score : 19     Therapy Time:   Individual   Time In 1040   Time Out 1055   Minutes [address] [place], [place], [facility] 11/17/21 at 11:12 AM       Definitions for assistance levels  Independent = pt does not require any physical supervision or assistance from another person for activity completion. Device may be needed.   Stand by assistance = pt requires verbal cues or instructions from another person, close to but not touching, to perform the activity  Minimal assistance= pt performs 75% or more of the activity; assistance is required to complete the activity  Moderate assistance= pt performs 50% of the activity; assistance is required to complete the activity  Maximal assistance = pt performs 25% of the activity; assistance is required to complete the activity  Dependent = pt requires total physical assistance to accomplish the task

## 2021-11-18 NOTE — DISCHARGE SUMMARY
Physician Discharge Summary     Patient ID:  [facility]  [id number]  77 y.o.  1960    Admit date: 11/16/2021    Discharge date : 11/18/21     Admitting Physician: Mary Cohen DO     Discharge Physician: Mary Cohen DO     Admission Diagnoses: Septicemia [facility]) [A41.9]  Multiple subsegmental pulmonary emboli without acute cor pulmonale ([facility] [address] [I26.94]  COVID-19 [U07.1]    Discharge Diagnoses: Pulmonary emboli refractory to Eliquis, COVID-19, new metastatic malignancy of unknown origin    Admission Condition: fair    Discharged Condition: fair    Hospital Course: 59 y.o. male with significant past medical history of HTN and recently diagnosed DVT (2 weeks ago) started on Eliquis who presents with harsh cough and sob. Per pt, symptoms started about 3 days after discharge from the ED after diagnosis of DVT. Pt states he has been taking the Eliquis. States he started feeling fatigued and weak and lost his appetite. Cough and sob worsened so he came to the ED today where CTA showed bilateral PE's with groundglass consistent with COVID pneumonia. Pt O2 was 97% on RA. COVID test came back positive. Pt received his first dose of Pfizer and was due for his second dose recently. Unknown on the timing of the PE's (if they were developing when diagnosed with the DVT or developed after while patient on Eliquis). Pt admitted on lovenox BID and hematology consulted. Lesions noted on liver and bones. Hematology agreed with continuing levonox and ordered malignancy workup which came back highly likely for malignancy. Recommended further workup as an outpatient. Pt continued to be on RA. Hematology ok with discharge on Xarelto on 11/18 and patient was stable and discharged home in improved condition. Pt will follow up with hematology and PCP after discharge. Consults: hematology/oncology    Discharge Exam:  Constitutional: Awake and alert in no acute distress.  Lying in bed comfortably  Head: Normocephalic, atraumatic  Eyes: EOMI, PERRLA  ENT: moist mucous membranes  Neck: neck supple, trachea midline  Lungs: Good inspiratory effort, no wheeze, no rhonchi, no rales  Heart: RRR, normal S1 and S2  GI: Soft, non-distended, non tender, no guarding, no rebound, +BS  Skin: warm, dry  Psych: appropriate affect    Labs:   Recent Labs     11/16/21  1448 11/17/21  0527 11/18/21  0548   WBC 9.3 7.3 8.2   HGB 12.2* 10.6* 10.9*   HCT 38.5* 33.4* 34.1*    171 207     Recent Labs     11/16/21  1448 11/17/21  0527 11/18/21  0548    139 139   K 4.6 3.9 3.9    107 108*   CO2 22 21 19*   BUN 21 16 13   CREATININE 0.55* 0.41* 0.37*   CALCIUM 8.4* 8.1* 7.9*     Recent Labs     11/16/21  1448 11/17/21  0527 11/18/21  0548   AST 64* 50* 42*   ALT 47* 36 35   BILITOT 0.5 0.5 0.6   ALKPHOS 1,184* 1,190* 1,126*     Recent Labs     11/16/21  1448   INR 1.5     Recent Labs     11/16/21  1448   TROPONINI <0.010       Urinalysis:   Lab Results   Component Value Date    NITRU Negative 11/16/2021    BLOODU Negative 11/16/2021    SPECGRAV 1.082 11/16/2021    GLUCOSEU Negative 11/16/2021       Radiology:   Most recent    Chest CT      WITH CONTRAST:No results found for this or any previous visit. WITHOUT CONTRAST: No results found for this or any previous visit. CXR      2-view: No results found for this or any previous visit. Portable: No results found for this or any previous visit. Echo No results found for this or any previous visit.       Disposition: home    In process/preliminary results:  Outstanding Order Results     Date and Time Order Name Status Description    11/18/2021  5:48 AM CBC Auto Differential Preliminary     11/17/2021 10:09 AM Beta 2 Glycoprotein I AB (IGG,IGA,IGM) In process     11/17/2021 10:09 AM Cardiolipin Antibodies IgG & IgM In process     11/17/2021 10:09 AM Lupus Anticoagulant In process     11/17/2021 10:09 AM Cancer Antigen 19-9 In process     11/16/2021  2:48 PM Culture, Blood 2

## 2021-11-18 NOTE — FLOWSHEET NOTE
1025- Patient resting in bed, ate approximately 50% of breakfast, tolerating PO fluids well. Breathing is unlabored on RA, lungs are clear/ diminished in bases. Patient has no complaints at this time. Patient is up to bathroom with assistance d/t IV pole, gait is steady. 36- Patients niece is here to  the patient for discharge. Patient requested that I go threw the discharge with her also. Discharge instructions given to patients niece, she is aware of new medication xarelto, and f/u appt with Dr. Charlene Cross. Patient also given discharge instructions, aware of xarelto and f/u appt. IV removed from right forearm, telemetry removed and sent to [address] [place] [address]. Patient states that he is leaving with everything he had, which was clothing.

## 2021-11-18 NOTE — PROGRESS NOTES
Hematology/Oncology   Progress Note        CHIEF COMPLAINT/HPI:  Follow up of Covid pneumonia with PE and DVT. His CEA is over 8000, highly suggestive of malignancy. Challenge is when to do biopsy. Treatment is dependent on pathology. Treatment is on hold until he is hold until we can get pathology and biopsy. REVIEW OF SYSTEMS:    Unremarkable except for symptoms mentioned in HPI.     Current Inpatient Medications:    Current Facility-Administered Medications   Medication Dose Route Frequency Provider Last Rate Last Admin    sodium chloride flush 0.9 % injection 5-40 mL  5-40 mL IntraVENous 2 times per day [facility] DO   10 mL at 11/17/21 2058    sodium chloride flush 0.9 % injection 5-40 mL  5-40 mL IntraVENous PRN [facility] DO        0.9 % sodium chloride infusion  25 mL IntraVENous PRN [facility] DO        enoxaparin (LOVENOX) injection 80 mg  1 mg/kg SubCUTAneous BID [facility] DO   80 mg at 11/18/21 1018    ondansetron (ZOFRAN-ODT) disintegrating tablet 4 mg  4 mg Oral Q8H PRN [facility] DO        Or    on[facility]ansetron [facility] injection 4 mg  4 mg IntraVENous Q6H PRN [facility] DO        polyethylene glycol (GLYCOLAX) packet 17 g  17 g Oral Daily PRN [facility] DO        acetaminophen (TYLENOL) tablet 650 mg  650 mg Oral Q6H PRN [facility] DO        Or    acetaminophen (TYLENOL) suppository 650 mg  650 mg Rectal Q6H PRN [facility] DO        guaiFENesin-dextromethorphan (ROBITUSSIN DM) 100-10 MG/5ML syrup 5 mL  5 mL Oral Q4H PRN [facility] DO        Vitamin D (CHOLECALCIFEROL) tablet 2,000 Units  2,000 Units Oral Daily [facility] DO   2,000 Units at 11/18/21 1018    0.9 % sodium chloride infusion   IntraVENous Continuous [facility]  mL/hr at 11/17/21 1424 New Bag at 11/17/21 1424       PHYSICAL EXAM:    EYES:  Lids and lashes normal, pupils equal, round and reactive to light, extra ocular muscles intact, sclera clear, conjunctiva normal    ENT:  Normocephalic, without obvious abnormality, atraumatic, sinuses nontender on palpation, external ears without lesions, oral pharynx with moist mucus membranes, tonsils without erythema or exudates, gums normal and good dentition. NECK:  Supple, symmetrical, trachea midline, no adenopathy, thyroid symmetric, not enlarged and no tenderness, skin normal    CHEST:    LUNGS:  No increased work of breathing, good air exchange, clear to auscultation bilaterally, no crackles or wheezing    CARDIOVASCULAR:  Normal apical impulse, regular rate and rhythm, normal S1 and S2, no S3 or S4, and no murmur noted    ABDOMEN:  No scars, normal bowel sounds, soft, non-distended, non-tender, no masses palpated, no hepatosplenomegally    MUSCULOSKELETAL:  There is no redness, warmth, or swelling of the joints. Full range of motion noted. Motor strength is 5 out of 5 all extremities bilaterally.   Tone is normal.  EXTREMITIES:    NEURO:    DATA:      PT/INR:  No results found for: PTINR  PTT:    Lab Results   Component Value Date    APTT 29.5 11/16/2021     CMP:    Lab Results   Component Value Date     11/18/2021    K 3.9 11/18/2021     11/18/2021    CO2 19 11/18/2021    BUN 13 11/18/2021    PROT 5.0 11/18/2021     Magnesium:    Lab Results   Component Value Date    MG 2.4 11/16/2021     Phosphorus:  No components found for: PO4  Calcium:  No components found for: CA  CBC:    Lab Results   Component Value Date    WBC 8.2 11/18/2021    RBC 3.82 11/18/2021    HGB 10.9 11/18/2021    HCT 34.1 11/18/2021    MCV 89.3 11/18/2021    RDW 17.4 11/18/2021     11/18/2021     DIFF:    Lab Results   Component Value Date    MCV 89.3 11/18/2021    RDW 17.4 11/18/2021      LDH:    Lab Results   Component Value Date     11/16/2021     Uric Acid:  No components found for: URIC    EKG Reviewed  Appropriate Radiology Reviewed      Pathology: Reviewed where indicated      ASSESSMENT:  Active Problems: COVID-19    Multiple subsegmental pulmonary emboli without acute cor pulmonale (HCC)    Septicemia (HCC)  Resolved Problems:    * No resolved hospital problems. *    Patient Active Problem List   Diagnosis    Benign prostatic hyperplasia with weak urinary stream    Non-recurrent unilateral inguinal hernia without obstruction or gangrene    Pure hypercholesterolemia    Right inguinal hernia    Essential hypertension    Unintentional weight loss    Chronic bilateral low back pain without sciatica    COVID-19    Multiple subsegmental pulmonary emboli without acute cor pulmonale (HCC)    Septicemia (HCC)       PLAN:  Continue current treatment.           Electronically signed by Isabela Blair MD on 11/18/21 at 10:34 AM EST

## 2021-11-18 NOTE — DISCHARGE INSTR - DIET
Good nutrition is important when healing from an illness, injury, or surgery. Follow any nutrition recommendations given to you during your hospital stay. If you were given an oral nutrition supplement while in the hospital, continue to take this supplement at home. You can take it with meals, in-between meals, and/or before bedtime. These supplements can be purchased at most local grocery stores, pharmacies, and chain [employer]-stores. If you have any questions about your diet or nutrition, call the hospital and ask for the dietitian. GENERAL DIET AS TOLERATED.

## 2021-11-19 NOTE — PROGRESS NOTES
Physical Therapy  Facility/Department: [facility] MED SURG [id number]  Physical Therapy Discharge      NAME: Todd Franklin    : 1960 (59 y.o.)  MRN: [id number]    Account: [de-identified]  Gender: male      Patient has been discharged from acute care hospital. DC patient from current PT program.      Electronically signed by Janneth Jalloh PT on 21 at 2:58 PM EST

## 2021-11-19 NOTE — CARE COORDINATION
[facility] Transitions Initial Follow Up Call    Call within 2 business days of discharge: Yes    Patient: Magda Lopez Patient : 1960   MRN: [id number]  Reason for Admission: -21 COVID-19; Multiple Subsegmental Pulmonary Emboli; Septicemia  Discharge Date: 21 RARS: Readmission Risk Score: 15.1 ( )      Last Discharge [facility]       Complaint Diagnosis Description Type Department Provider    21 Cough COVID-19 . .. ED to Hosp-Admission (Discharged) (ADMIT) [address], DO        Date/Time:  2021 12:16 PM  Attempted to reach patient by telephone for Initial Care Transition call. Call within 2 business days of discharge: Yes  Mailbox is full. Will attempt to reach patient again.     [place] [facility] [place]    Non-face-to-face services provided:      Care Transitions 24 Hour Call    Care Transitions Interventions         Follow Up  Future Appointments   Date Time Provider Jess Hicks   2021 10:30 AM LYUDMILA Auguste - CNP [facility] PHYS [address] [facility] [address] [facility], [address]

## 2021-11-22 NOTE — CARE COORDINATION
[facility] Transitions Initial Follow Up Call    Call within 2 business days of discharge: Yes    Patient: Preethi Viera Patient : 1960   MRN: [id number]  Reason for Admission: -21 COVID-19; Multiple Subsegmental Pulmonary Emboli; Septicemia  Discharge Date: 21 RARS: Readmission Risk Score: 15.1 ( )      Last Discharge [facility]       Complaint Diagnosis Description Type Department Provider    21 Cough COVID-19 . .. ED to Hosp-Admission (Discharged) (ADMIT) [address], DO           Spoke with: Patient    Georgia Smith reports doing well. Pt states that he has had some body aches. Denies SOB, cough, congestion, fever, chills, n/v/d or flu like symptoms. Pt states that he was told to stop his BP medication at discharge d/t low BP in [place]. Pt does not have a BP cuff at home. Advised Pt to discuss BP with PCP tomorrow during visit. Pt v/u. Medication Review completed. Denies Transportation, Home, or Medication needs. Facility[place] [facility] [place]    Patient contacted regarding COVID-19 diagnosis. Discussed COVID-19 related testing which was available at this time. Test results were positive. Patient informed of results, if available? Yes. Care Transition Nurse contacted the patient by telephone to perform post discharge assessment. Call within 2 business days of discharge: Yes. Verified name and  with patient as identifiers. Provided introduction to self, and explanation of the CTN/ACM role, and reason for call due to risk factors for infection and/or exposure to COVID-19. Symptoms reviewed with patient who verbalized the following symptoms: pain or aching joints. Due to no new or worsening symptoms encounter was not routed to provider for escalation. Discussed follow-up appointments. If no appointment was previously scheduled, appointment scheduling offered: Yes.   [facility] follow up appointment(s):   Future Appointments   Date Time Provider Jess Hicks   2021 10:30 AM LUYDMILA Robert CNP PHYS [facility]     [id number] [address] follow up appointment(s):     PCP [facility] F/U scheduled 11/23/21    Non-face-to-face services provided:  Obtained and reviewed discharge summary and/or continuity of care documents     Advance Care Planning:   Does patient have an Advance Directive:  not addressed. Educated patient about risk for severe COVID-19 due to risk factors according to CDC guidelines. CTN reviewed discharge instructions, medical action plan and red flag symptoms with the patient who verbalized understanding. Discussed COVID vaccination status: Yes. Education provided on COVID-19 vaccination as appropriate. Discussed exposure protocols and quarantine with CDC Guidelines. Patient was given an opportunity to verbalize any questions and concerns and agrees to contact CTN or health care provider for questions related to their healthcare. Reviewed and educated patient on any new and changed medications related to discharge diagnosis     Was patient discharged with a pulse oximeter? No Discussed and confirmed pulse oximeter discharge instructions and when to notify provider or seek emergency care. CTN provided contact information. Plan for follow-up call in 5-7 days based on severity of symptoms and risk factors.     Care Transitions 24 Hour Call    Care Transitions Interventions         Follow Up  Future Appointments   Date Time Provider Jess Hicks   12/1/2021 10:30 AM LYUDMILA Robert CNP [facility] PHYS [address] [facility] [address] [facility]

## 2021-12-01 NOTE — PROGRESS NOTES
Subjective:      Patient Id: Seen Funmi Eden at [facility], for initial palliative medicine consult for symptom management, advance care planning and goals of care discussion. He was accompanied to the appointment by: his niece. Chief Complaint   Patient presents with    Pain    Anorexia     lack of appepitie    Other     nausea    Referral - General      HPI       Wilton Lanier is a 59 y.o. male referred to palliative care by Davy Marquez for symptom management, advance care planning, and goals of care conversation. Funmi Eden has complex medical history that includes Bilateral acute pulmonary emboli, Fracture T5 vertebral, Liver lesions and bone lesions suspected of cancer. Patient complains of chronic pain. States pain is not well controlled on current regime. Rates the pain at a Pain Score:   6 on a scale of 0 to 10. States it occurs in bilateral hips, abd, and lower back. Describes pain as  a constant ache. Currently taking Percocet 0.5-2 tabs daily with relief. Denies Sedation, constipation, or other adverse effects on current regime. Patient awaiting liver biopsy for suspected liver cancer. On 11/16/21 CTA chest shows Diffuse permeative change, thoracic spine, right scapula, and bilateral ribs. Metastatic malignancy diagnosis of exclusion. Fracture, T5 vertebral body. Also  CT chest and abd 10/30/21 showed Heterogenous sclerosis throughout the right and left iliac bones and sacrum, heterogenous sclerosis within multiple lower thoracic and lumbar vertebrae findings most concerning for metastatic disease. Numerous bone and liver lesions as detailed most concerning for metastatic disease. Noted weight loss and appetite loss noted. States he eats small meals and feels no urge to eat after. Denies early satiety. Noted weight loss of 45 lbs in last 6 months Makes own meals. Tolerating diet without nausea at this time. Ambulation and strength reduced.        States he is also having insomnia related to diagnosis and pain at HS. Sleeping on average 2-4 hours a night. CT chest on 21 showed Bilateral acute pulmonary emboli within segmental and subsegmental branches as discussed. Was discharged on Xarelto and Lovenox though. Per patient Dr [facility] stopped Xarelto x 1 month and was told to only take Lovenox with possible transition to Xarelto after. Denies overwhelming depression and anxiety: denies suicidal or homicidal ideation or signs suggesting existential grief or spiritual pain. Past Medical History:   Diagnosis Date    Chronic bilateral low back pain without sciatica 2021    Chronic bilateral low back pain without sciatica 2021    Essential hypertension     Pure hypercholesterolemia 2018     History reviewed. No pertinent surgical history. Social History     Socioeconomic History    Marital status: Single     Spouse name: Not on file    Number of children: Not on file    Years of education: Not on file    Highest education level: Not on file   Occupational History    Not on file   Tobacco Use    Smoking status: Former Smoker     Packs/day: 1.00     Years: 10.00     Pack years: 10.00     Types: Cigarettes     Start date: 1979     Quit date: 1988     Years since quittin.9    Smokeless tobacco: Never Used   Vaping Use    Vaping Use: Never used   Substance and Sexual Activity    Alcohol use: No    Drug use: No    Sexual activity: Not on file   Other Topics Concern    Not on file   Social History Narrative    Not on file     Social Determinants of Health     Financial Resource Strain: Low Risk     Difficulty of Paying Living Expenses: Not hard at all   Food Insecurity: No Food Insecurity    Worried About [address] in the Last Year: Never true    [address] in the Last Year: Never true   Transportation Needs: No Transportation Needs    Lack of Transportation (Medical): No    Lack of Transportation (Non-Medical):  No   Physical Skin: Negative. Neurological: Positive for weakness. Negative for dizziness and syncope. Psychiatric/Behavioral: Negative for confusion, sleep disturbance and suicidal ideas. The patient is not nervous/anxious. Objective:   /78   Pulse 86   Resp 20   SpO2 98%    Wt Readings from Last 3 Encounters:   11/16/21 171 lb (77.6 kg)   10/30/21 170 lb (77.1 kg)   08/25/21 188 lb 9.6 oz (85.5 kg)     Physical Exam  Vitals reviewed. Constitutional:       Appearance: He is ill-appearing. HENT:      Head: Normocephalic and atraumatic. Eyes:      Pupils: Pupils are equal, round, and reactive to light. Cardiovascular:      Rate and Rhythm: Normal rate. Pulmonary:      Effort: Pulmonary effort is normal.      Breath sounds: Normal breath sounds. Abdominal:      General: Bowel sounds are normal. There is no distension. Palpations: Abdomen is soft. Tenderness: There is no abdominal tenderness. Musculoskeletal:      Cervical back: Normal range of motion. Skin:     General: Skin is warm and dry. Neurological:      Mental Status: He is alert and oriented to person, place, and time. Motor: Weakness present. Gait: Gait abnormal.   Psychiatric:         Behavior: Behavior normal.         Assessment and Plan:      1. Other chronic pain  2. Cancer related pain  - Exacerbated on current regime. Will maximize percocet. FU with interventional radiolgy as prescribed. Advised that considered cancer until proven otherwise. - oxyCODONE-acetaminophen (PERCOCET) 5-325 MG per tablet; Take 0.5-1 tablets by mouth every 8 hours as needed for Pain for up to 30 days. Dispense: 90 tablet; Refill: 0    Pt is tolerating current pain meds without adverse effects or over sedation. Lowest effective dose used. Pt advised to call and notify palliative care for any adverse effects or sedation  Pt is able to maintain adequate functional level and participate in ADLs  OARRS reviewed.  There is no indication of aberrant behavior  Pt advised to call for increasing or uncontrolled pain. Risk vs benefit assessed. Pt educated on risk of addiction. Pt advised to take only as prescribed and not to change frequency of pain meds without consulting palliative care first.    3. Lesion of liver  4. Bone lesion  5. Suspected malignant neoplasm  - FU with heme/onc as scheduled    6. Insomnia, unspecified type  7. Anorexia  8. Unintentional weight loss  - Advised that remeron would like aid in appetite stimulation and insomnia though other wise may melatonin 3mg night    - mirtazapine (REMERON) 15 MG tablet; Take 0.5 tablets by mouth nightly  Dispense: 45 tablet; Refill: 2    Small, frequent meals. Maximize calories and protein  Daily nutritional shakes    9. Weakness  10. Debility  No change in function from baseline  Reviewed safety and comfort measures  Maintain use of assistive devices PRN    11. Encounter for palliative care  - Made eb aware that he is hospice appropriate though declined at visit as opting for further workup and aggressive treatement      - Call for any questions, concerns or change in condition. Much support, guidance and active listening provided. Medications Discontinued During This Encounter   Medication Reason    rivaroxaban (XARELTO) 20 MG TABS tablet LIST CLEANUP    lisinopril (PRINIVIL;ZESTRIL) 20 MG tablet LIST CLEANUP    etodolac (LODINE) 400 MG tablet LIST CLEANUP        - Advance Care Planning   We discussed Living Will (LW), and [facility]) as well as their activation. Patient choice discussed. LW/HCPOA in place Yes (POA only declined LW) ; Tasked  for assistance with completing paperwork No; Code status discussed Yes; signed No. Code Full code. All related questions were answered completely. - Goals of Care Discussion:  Disease process and goals of treatment were discussed in basic terms.  Eduardo's goal is to optimize available comfort care measures to minimize symptomology pending biopsy results. We discussed the palliative care philosophy in light of those goals. We discussed all care options contingent on treatment response and QOL. Much active listening, presence, and emotional support were given. Discussed with patient/surrogate: POC, Treatment risks/benefits, and alternatives. All questions were answered. Additionally, a printed copy of the [facility] medications/opioid safety informational sheet was reviewed and given to patient for reference. Opioid contract signed:Yes    Due to acuity, symptomatology and high-risk medication management, I advised patient to Return in about 1 month (around 1/1/2022), or if symptoms worsen or fail to improve. Thanks for the opportunity you have allowed us to provide palliative care to Tremaine Uribe. We will be in touch as care progresses. Please feel free to reach out to us should you have any questions or requests.     Total Time 60 mins (Adv. Care planning 5 mins)   > 50% Time Spent Counseling/Care coordination yes       Kourtney Client, APRN - CNP    Collaborating physician: Dr. Dulce Del Cid

## 2021-12-03 NOTE — TELEPHONE ENCOUNTER
Pts niece who is POA called asking for [facility] referal all for PT and health aid if possible related to patients decline and weakness. Offered at first visit though patient declined. Advised that I would order and send message to SW as patient will likely need permanent [facility] services for ADLs and she could aid in placement of such.

## 2021-12-03 NOTE — CARE COORDINATION
[facility] Transitions Follow Up Call    12/3/2021    Patient: Preethi Viera  Patient : 1960   MRN: [id number]  Reason for Admission: -21 COVID-19; Multiple Subsegmental Pulmonary Emboli; Septicemia  Discharge Date: 21 RARS: Readmission Risk Score: 15.1 ( )         Spoke with: Attempted to contact patient for follow up Care Transition call. Left HIPPA compliant message requesting return call. Will attempt to reach again. [facility] / [id number]  [phone]      Care Transitions Subsequent and Final Call    Subsequent and Final Calls  Care Transitions Interventions  Other Interventions:            Follow Up  Future Appointments   Date Time Provider Jess Hicks   2021 11:30  [facility]   2021  1:00 PM LYUDMILA Horton - CNP [facility] [address]       Jess Gutierrez LPN

## 2021-12-03 NOTE — TELEPHONE ENCOUNTER
Pt's niece would like to speak to Thiago about ordering [address].  Please call Jossue Dale back at

## 2021-12-10 PROBLEM — J90 PLEURAL EFFUSION: Status: ACTIVE | Noted: 2021-01-01

## 2021-12-10 PROBLEM — I82.411 DVT OF DEEP FEMORAL VEIN, RIGHT (HCC): Status: ACTIVE | Noted: 2021-01-01

## 2021-12-10 PROBLEM — K76.9 LIVER LESION: Status: ACTIVE | Noted: 2021-01-01

## 2021-12-10 NOTE — TELEPHONE ENCOUNTER
Ni Navarro an RN with [facility] called. Stating that Eduardo's legs are swollen, and his resting heart rate was 123 and resting O2 sat is 92%. Pt is post covid and Ni Navarro would like to know if there is something that can be called in for the pt's heart rate.

## 2021-12-10 NOTE — TELEPHONE ENCOUNTER
Spoke with nurse Marylin Nascimento. Patients Resting O2 92 at rest has new onset 4+BLE edema, , Has increasing rales throughout lungs. Has history of both Bilateral acute pulmonary emboli within segmental and subsegmental branches and was andvised to FU with DR almaguer regarding confusion with anticoagulation therapy at last visit along with Liver lesions and bone lesions highly suspected of cancer. No noted history of CHF. Unable to reach patient though spoke with niece and primary caregiver [facility] and advised to seek ER eval immediately. She verbalized understanding.

## 2021-12-11 NOTE — FLOWSHEET NOTE
SATS 90% on RA. Placed on 2L NC and SATS improved to 96%. No signs of any acute distress noted. Call light remains in reach.  Electronically signed by Helen Nascimento RN on 12/11/2021 at 2:00 PM

## 2021-12-11 NOTE — CONSULTS
Chief Complaint   Patient presents with    Leg Swelling     +2 pitting edema to BLE, known PE's from COVID-19 taking lovenox BID         Patient is a 59 y.o. male who presents with a chief complaint of right lower extremity edema. . Patient is followed on a regular basis by Dr. Denise Weinberg MD. Patient with history of metastatic cancer status post recent presentation to emergency approximate month ago noted to have COVID-19 pneumonia as well as right lower extremity DVT as well as bilateral pulmonary embolism in his segmental and subsegmental areas. He was placed on Lovenox injections which she has been compliant with. Apparently home visiting nurse called the ambulance to bring patient in. Patient states he was not aware of what was happening. He denies any significant shortness of breath, chest pain, lightheadedness dizziness or syncope. Denies any lower extremity pain. Does have mild right lower extremity edema. Past Medical History:   Diagnosis Date    Chronic bilateral low back pain without sciatica 8/25/2021    Chronic bilateral low back pain without sciatica 8/25/2021    Essential hypertension     Pure hypercholesterolemia 1/12/2018      Patient Active Problem List   Diagnosis    Benign prostatic hyperplasia with weak urinary stream    Non-recurrent unilateral inguinal hernia without obstruction or gangrene    Pure hypercholesterolemia    Right inguinal hernia    Essential hypertension    Unintentional weight loss    Chronic bilateral low back pain without sciatica    COVID-19    Multiple subsegmental pulmonary emboli without acute cor pulmonale (HCC)    Septicemia ([address]    DVT of deep femoral vein, right (HCC)    Pleural effusion    Liver lesion       History reviewed. No pertinent surgical history.     Social History     Socioeconomic History    Marital status: Single     Spouse name: None    Number of children: None    Years of education: None    Highest education level: None   Occupational History    None   Tobacco Use    Smoking status: Former Smoker     Packs/day: 1.00     Years: 10.00     Pack years: 10.00     Types: Cigarettes     Start date: 1979     Quit date: 1988     Years since quittin.9    Smokeless tobacco: Never Used   Vaping Use    Vaping Use: Never used   Substance and Sexual Activity    Alcohol use: No    Drug use: No    Sexual activity: None   Other Topics Concern    None   Social History Narrative    None     Social Determinants of Health     Financial Resource Strain: Low Risk     Difficulty of Paying Living Expenses: Not hard at all   Food Insecurity: No Food Insecurity    Worried About Running Out of Food in the Last Year: Never true    Ti of Food in the Last Year: Never true   Transportation Needs: No Transportation Needs    Lack of Transportation (Medical): No    Lack of Transportation (Non-Medical):  No   Physical Activity:     Days of Exercise per Week: Not on file    Minutes of Exercise per Session: Not on file   Stress:     Feeling of Stress : Not on file   Social Connections:     Frequency of Communication with Friends and Family: Not on file    Frequency of Social Gatherings with Friends and Family: Not on file    Attends [employer] Services: Not on file    Active Member of [address] [place] [address] [employer] or Organizations: Not on file    Attends Club or Organization Meetings: Not on file    Marital Status: Not on file   Intimate Partner Violence:     Fear of Current or Ex-Partner: Not on file    Emotionally Abused: Not on file    Physically Abused: Not on file    Sexually Abused: Not on file   Housing Stability:     Unable to Pay for Housing in the Last Year: Not on file    Number of [place] in the Last Year: Not on file    Unstable Housing in the Last Year: Not on file       Family History   Problem Relation Age of Onset    Cancer Mother     No Known Problems Brother     Other Sister     Cancer Sister     No Known Problems Sister     No Known Problems Brother        Current Facility-Administered Medications   Medication Dose Route Frequency Provider Last Rate Last Admin    sodium chloride flush 0.9 % injection 5-40 mL  5-40 mL IntraVENous 2 times per day Nicoloo Andrean-Roche, APRN - CNP   10 mL at 12/11/21 0018    sodium chloride flush 0.9 % injection 5-40 mL  5-40 mL IntraVENous PRN Nicoletto Bolzan-Roche, APRN - CNP        0.9 % sodium chloride infusion  25 mL IntraVENous PRN Nicoloo Alozan-Roche, APRN - CNP        ondansetron (ZOFRAN-ODT) disintegrating tablet 4 mg  4 mg Oral Q8H PRN Nicoloo Alozan-Roche, APRN - CNP        Or    ondansetron (ZOFRAN) injection 4 mg  4 mg IntraVENous Q6H PRN Nicoloo Alozan-Roche, APRN - CNP        polyethylene glycol (GLYCOLAX) packet 17 g  17 g Oral Daily PRN Markoso Alozan-Roche, APRN - CNP        acetaminophen (TYLENOL) tablet 650 mg  650 mg Oral Q6H PRN Nicoloo Alozan-Roche, APRN - CNP        Or    acetaminophen (TYLENOL) suppository 650 mg  650 mg Rectal Q6H PRN Markoso Alozan-Roche, APRN - CNP        heparin (porcine) injection 6,210 Units  80 Units/kg IntraVENous PRN Nicoletto Bolzan-Roche, APRN - CNP   6,210 Units at 12/11/21 0712    heparin (porcine) injection 3,100 Units  40 Units/kg IntraVENous PRN Markoso Alozan-Roche, APRN - CNP        heparin 25,000 units in dextrose 5% 250 mL (premix) infusion  5-30 Units/kg/hr IntraVENous Continuous Nicoloo Bolzan-Roche, APRN - CNP 17.1 mL/hr at 12/11/21 1336 22 Units/kg/hr at 12/11/21 1336       ALLERGIES: Patient has no known allergies. Review of Systems   Constitutional: Negative for activity change, appetite change, chills, diaphoresis, fatigue and fever. HENT: Negative for facial swelling, nosebleeds and trouble swallowing. Eyes: Negative for discharge, redness and visual disturbance. Respiratory: Negative. Negative for apnea, cough, choking, chest tightness, shortness of breath, wheezing and stridor. Cardiovascular: Negative. Gastrointestinal: Negative. Genitourinary: Negative for difficulty urinating, flank pain, frequency and hematuria. Musculoskeletal: Negative. Negative for neck pain. Skin: Negative. Neurological: Negative for dizziness, seizures, syncope, speech difficulty, weakness and light-headedness. Psychiatric/Behavioral: Negative for behavioral problems, confusion and sleep disturbance. The patient is not nervous/anxious. VITALS:  Blood pressure 127/74, pulse 117, temperature 98.2 °F (36.8 °C), temperature source Oral, resp. rate 20, height 5' 10\" (1.778 m), weight 171 lb (77.6 kg), SpO2 96 %. Body mass index is 24.54 kg/m². Physical Exam  Constitutional:       Appearance: He is well-developed. He is not diaphoretic. HENT:      Head: Normocephalic and atraumatic. Eyes:      Conjunctiva/sclera: Conjunctivae normal.      Pupils: Pupils are equal, round, and reactive to light. Neck:      Thyroid: No thyromegaly. Vascular: Normal carotid pulses. No carotid bruit, hepatojugular reflux or JVD. Trachea: No tracheal deviation. Cardiovascular:      Rate and Rhythm: Normal rate and regular rhythm. Chest Wall: PMI is not displaced. Pulses: Intact distal pulses. Carotid pulses are 3+ on the right side and 3+ on the left side. Radial pulses are 3+ on the right side and 3+ on the left side. Femoral pulses are 3+ on the right side and 3+ on the left side. Popliteal pulses are 3+ on the right side and 3+ on the left side. Dorsalis pedis pulses are 3+ on the right side and 3+ on the left side. Posterior tibial pulses are 3+ on the right side and 3+ on the left side. Heart sounds: Normal heart sounds, S1 normal and S2 normal. No murmur heard. No friction rub. No gallop. No S3 or S4 sounds. Pulmonary:      Effort: Pulmonary effort is normal. No tachypnea or respiratory distress.       Breath sounds: Normal breath sounds. No wheezing, rhonchi or rales. Chest:      Chest wall: No tenderness. Abdominal:      General: Bowel sounds are normal. There is no distension. Palpations: Abdomen is soft. Tenderness: There is no abdominal tenderness. There is no guarding or rebound. Musculoskeletal:         General: Swelling present. No tenderness. Normal range of motion. Skin:     General: Skin is warm. Findings: No erythema or rash. Nails: There is no clubbing. Neurological:      Mental Status: He is alert and oriented to person, place, and time. Cranial Nerves: No cranial nerve deficit. Coordination: Coordination normal.   Psychiatric:         Behavior: Behavior normal.         Thought Content:  Thought content normal.         Judgment: Judgment normal.         LABS:  Recent Results (from the past 24 hour(s))   EKG 12 Lead - Chest Pain    Collection Time: 12/10/21  5:04 PM   Result Value Ref Range    Ventricular Rate 118 BPM    Atrial Rate 118 BPM    P-R Interval 138 ms    QRS Duration 84 ms    Q-T Interval 310 ms    QTc Calculation (Bazett) 434 ms    P Axis 37 degrees    R Axis 8 degrees    T Axis 28 degrees   Comprehensive Metabolic Panel    Collection Time: 12/10/21  5:15 PM   Result Value Ref Range    Sodium 137 135 - 144 mEq/L    Potassium 4.1 3.4 - 4.9 mEq/L    Chloride 100 95 - 107 mEq/L    CO2 22 20 - 31 mEq/L    Anion Gap 15 9 - 15 mEq/L    Glucose 118 (H) 70 - 99 mg/dL    BUN 20 8 - 23 mg/dL    CREATININE 0.42 (L) 0.70 - 1.20 mg/dL    GFR Non-African American >60.0 >60    GFR  >60.0 >60    Calcium 8.3 (L) 8.5 - 9.9 mg/dL    Total Protein 5.5 (L) 6.3 - 8.0 g/dL    Albumin 2.2 (L) 3.5 - 4.6 g/dL    Total Bilirubin 0.3 0.2 - 0.7 mg/dL    Alkaline Phosphatase 1,402 (H) 35 - 104 U/L    ALT 64 (H) 0 - 41 U/L    AST 82 (H) 0 - 40 U/L    Globulin 3.3 2.3 - 3.5 g/dL   CBC Auto Differential    Collection Time: 12/10/21  5:15 PM   Result Value Ref Range    WBC 10.9 (H) 4.8 - 10.8 K/uL    RBC 3.69 (L) 4.70 - 6.10 M/uL    Hemoglobin 10.5 (L) 14.0 - 18.0 g/dL    Hematocrit 32.9 (L) 42.0 - 52.0 %    MCV 89.2 80.0 - 100.0 fL    MCH 28.5 27.0 - 31.3 pg    MCHC 32.0 (L) 33.0 - 37.0 %    RDW 18.3 (H) 11.5 - 14.5 %    Platelets 864 390 - 376 K/uL    PLATELET SLIDE REVIEW Adequate     Neutrophils % 78.0 %    Lymphocytes % 14.0 %    Monocytes % 2.5 %    Eosinophils % 1.0 %    Basophils % 1.0 %    Neutrophils Absolute 8.9 (H) 1.4 - 6.5 K/uL    Lymphocytes Absolute 1.6 1.0 - 4.8 K/uL    Monocytes Absolute 0.3 0.2 - 0.8 K/uL    Eosinophils Absolute 0.1 0.0 - 0.7 K/uL    Basophils Absolute 0.0 0.0 - 0.2 K/uL    Metamyelocytes Relative 1 %    Myelocyte Percent 3 %    Prolymphocytes 1 (A) %    nRBC 1 /100 WBC    Anisocytosis 1+     Microcytes 1+     Polychromasia 2+     Hypochromia 1+     Poikilocytes 1+     Tear Drop Cells 1+    Magnesium    Collection Time: 12/10/21  5:15 PM   Result Value Ref Range    Magnesium 1.9 1.7 - 2.4 mg/dL   Troponin    Collection Time: 12/10/21  5:15 PM   Result Value Ref Range    Troponin <0.010 0.000 - 0.010 ng/mL   TSH without Reflex    Collection Time: 12/10/21  5:15 PM   Result Value Ref Range    TSH 2.600 0.440 - 3.860 uIU/mL   Brain Natriuretic Peptide    Collection Time: 12/10/21  5:15 PM   Result Value Ref Range    Pro- pg/mL   APTT    Collection Time: 12/10/21  5:15 PM   Result Value Ref Range    aPTT 38.5 (H) 24.4 - 36.8 sec   Protime-INR    Collection Time: 12/10/21  5:15 PM   Result Value Ref Range    Protime 14.8 12.3 - 14.9 sec    INR 1.2    COVID-19, Rapid    Collection Time: 12/10/21  5:19 PM    Specimen: Nasopharyngeal Swab   Result Value Ref Range    SARS-CoV-2, NAAT Not Detected Not Detected   POCT Venous    Collection Time: 12/10/21  5:22 PM   Result Value Ref Range    POC Creatinine 0.4 (L) 0.8 - 1.3 mg/dL    GFR Non-African American >60 >60    GFR  >60 >60    Sample Type PREETI     Performed on SEE BELOW    POCT CREATININE Collection Time: 12/10/21  5:23 PM   Result Value Ref Range    POC CREATININE WHOLE BLOOD 0.4    CBC    Collection Time: 12/10/21 11:59 PM   Result Value Ref Range    WBC 11.2 (H) 4.8 - 10.8 K/uL    RBC 3.79 (L) 4.70 - 6.10 M/uL    Hemoglobin 10.8 (L) 14.0 - 18.0 g/dL    Hematocrit 33.3 (L) 42.0 - 52.0 %    MCV 88.0 80.0 - 100.0 fL    MCH 28.4 27.0 - 31.3 pg    MCHC 32.3 (L) 33.0 - 37.0 %    RDW 18.3 (H) 11.5 - 14.5 %    Platelets 605 784 - 325 K/uL   APTT    Collection Time: 12/10/21 11:59 PM   Result Value Ref Range    aPTT 35.0 24.4 - 36.8 sec   Protime-INR    Collection Time: 12/10/21 11:59 PM   Result Value Ref Range    Protime 14.4 12.3 - 14.9 sec    INR 1.1    Comprehensive Metabolic Panel    Collection Time: 12/11/21  6:15 AM   Result Value Ref Range    Sodium 139 135 - 144 mEq/L    Potassium 3.7 3.4 - 4.9 mEq/L    Chloride 100 95 - 107 mEq/L    CO2 26 20 - 31 mEq/L    Anion Gap 13 9 - 15 mEq/L    Glucose 89 70 - 99 mg/dL    BUN 13 8 - 23 mg/dL    CREATININE 0.40 (L) 0.70 - 1.20 mg/dL    GFR Non-African American >60.0 >60    GFR  >60.0 >60    Calcium 8.2 (L) 8.5 - 9.9 mg/dL    Total Protein 5.4 (L) 6.3 - 8.0 g/dL    Albumin 2.1 (L) 3.5 - 4.6 g/dL    Total Bilirubin 0.4 0.2 - 0.7 mg/dL    Alkaline Phosphatase 1,558 (H) 35 - 104 U/L    ALT 57 (H) 0 - 41 U/L    AST 50 (H) 0 - 40 U/L    Globulin 3.3 2.3 - 3.5 g/dL   Magnesium    Collection Time: 12/11/21  6:15 AM   Result Value Ref Range    Magnesium 1.9 1.7 - 2.4 mg/dL   CBC Auto Differential    Collection Time: 12/11/21  6:15 AM   Result Value Ref Range    WBC 9.9 4.8 - 10.8 K/uL    RBC 3.70 (L) 4.70 - 6.10 M/uL    Hemoglobin 10.6 (L) 14.0 - 18.0 g/dL    Hematocrit 32.6 (L) 42.0 - 52.0 %    MCV 88.1 80.0 - 100.0 fL    MCH 28.8 27.0 - 31.3 pg    MCHC 32.7 (L) 33.0 - 37.0 %    RDW 18.7 (H) 11.5 - 14.5 %    Platelets 719 745 - 709 K/uL    Neutrophils % 78.8 %    Lymphocytes % 13.4 %    Monocytes % 6.4 %    Eosinophils % 0.7 %    Basophils % 0.7 % Neutrophils Absolute 7.8 (H) 1.4 - 6.5 K/uL    Lymphocytes Absolute 1.3 1.0 - 4.8 K/uL    Monocytes Absolute 0.6 0.2 - 0.8 K/uL    Eosinophils Absolute 0.1 0.0 - 0.7 K/uL    Basophils Absolute 0.1 0.0 - 0.2 K/uL   HEPARIN LEVEL/ANTI-XA    Collection Time: 12/11/21  6:15 AM   Result Value Ref Range    Anti-XA Unfrac Heparin 0.13 IU/mL   HEPARIN LEVEL/ANTI-XA    Collection Time: 12/11/21  1:18 PM   Result Value Ref Range    Anti-XA Unfrac Heparin 0.47 IU/mL     Troponin:   Lab Results   Component Value Date    TROPONINI <0.010 12/10/2021       EKG: normal sinus rhythm, nonspecific ST and T waves changes      ASSESSMENT:    Active Hospital Problems    Diagnosis Date Noted    DVT of deep femoral vein, right (HCC) [I82.411] 12/10/2021     Priority: Low    Pleural effusion [J90] 12/10/2021     Priority: Low    Liver lesion [K76.9] 12/10/2021     Priority: Low     Subacute provoked extensive right lower extremity DVT with mild lower extremity edema. Likely due to underlying cancer    Bilateral segmental/subsegmental pulmonary embolism. History of liver cancer with mets    Essential hypertension    Hyperlipidemia    Status post recent COVID-19 pneumonia. PLAN:   1. As always, aggressive risk factor modification is strongly recommended. We should adhere to the JNC VIII guidelines for HTN management and the NCEPATP III guidelines for LDL-C management. 2. Continue with anticoagulation. Transition to oral anticoagulation prior to discharge. 3. No indication for IVC filter at this time. DVT and PE appears unchanged as compared to imaging performed a month ago. No extension of thromboembolic disease suggest anticoagulation failure/breakthrough. 4. Continue to monitor on telemetry  5. GI/DVT prophylaxis  6. Heme-onc recommendations  7.  Further recommendations to follow    Electronically signed by Willy Maxwell DO on 12/11/2021 at 4:03 PM

## 2021-12-11 NOTE — PROGRESS NOTES
Hospitalist Progress Note      Date of Admission: 12/10/2021  Chief Complaint:    Chief Complaint   Patient presents with    Leg Swelling     +2 pitting edema to BLE, known PE's from COVID-19 taking lovenox BID      Subjective:  No new complaints. No nausea, vomiting, chest pain, or headache      Medications:    Infusion Medications    sodium chloride      heparin (PORCINE) Infusion 22 Units/kg/hr (12/11/21 0711)     Scheduled Medications    sodium chloride flush  5-40 mL IntraVENous 2 times per day     PRN Meds: sodium chloride flush, sodium chloride, ondansetron **OR** ondansetron, polyethylene glycol, acetaminophen **OR** acetaminophen, heparin (porcine), heparin (porcine)    Intake/Output Summary (Last 24 hours) at 12/11/2021 1234  Last data filed at 12/11/2021 0644  Gross per 24 hour   Intake --   Output 1500 ml   Net -1500 ml     Exam:  /83   Pulse 111   Temp 98.4 °F (36.9 °C) (Oral)   Resp 18   Ht 5' 10\" (1.778 m)   Wt 171 lb (77.6 kg)   SpO2 90%   BMI 24.54 kg/m²   Head: Normocephalic, atraumatic  Sclera clear  Neck JVD flat  Lungs: normal effort of breathing    Labs:   Recent Labs     12/10/21  1715 12/10/21  2359 12/11/21  0615   WBC 10.9* 11.2* 9.9   HGB 10.5* 10.8* 10.6*   HCT 32.9* 33.3* 32.6*    358 350     Recent Labs     12/10/21  1715 12/10/21  1722 12/11/21  0615     --  139   K 4.1  --  3.7     --  100   CO2 22  --  26   BUN 20  --  13   CREATININE 0.42* 0.4* 0.40*   CALCIUM 8.3*  --  8.2*   AST 82*  --  50*   ALT 64*  --  57*   BILITOT 0.3  --  0.4   ALKPHOS 1,402*  --  1,558*     Recent Labs     12/10/21  1715 12/10/21  2359   INR 1.2 1.1     Recent Labs     12/10/21  1715   TROPONINI <0.010     Radiology:  CTA Chest W WO  (PE study)   Final Result      Pulmonary emboli, which appear worsened on the left as discussed. Findings suspicious for right heart strain. Nonspecific lung opacities as discussed.       Osseous metastatic disease with pathologic T5 compression fracture. Liver metastatic disease. US DUP LOWER EXTREMITIES BILATERAL VENOUS   Final Result      Positive for DVT involving the right distal femoral vein, popliteal vein, and right calf veins, similar to prior. Negative for DVT involving the left leg. Assessment/Plan:    Bilateral PE with DVT. Case dw dr holiday by telephone. He will review images and suggest plan of care in consult note. Patient updated. Pleural effusion-bilateral moderate in size. Patient currently is not symptomatic. Will monitor and if needed thoracentesis, will consult IR. Liver lesions and possible metastatic cancer to the bones-follows up with oncology and palliative.   Consult both    35 minutes total care time, >1/2 in unit/floor time and care coordination     Jose Salvador MD

## 2021-12-11 NOTE — H&P
[place] [address] MEDICINE    HISTORY AND PHYSICAL EXAM    PATIENT NAME:  Darin Barroso    MRN:  [id number]  SERVICE DATE:  12/10/2021   SERVICE TIME:  10:36 PM    Primary Care Physician: Ana Lopez MD         SUBJECTIVE  CHIEF COMPLAINT:  BLE edema    HPI:   Patient being admitted for heart strain related to bilateral pulmonary emboli with DVT and bilateral pleural effusion. Patient is a pleasant, alert and oriented x3,  male, 68-year-old. Patient's family members at the bedside with his niece and also POA. Patient reports that he is being seen currently by palliative care due to chronic pain and lack of appetite. Patient has been diagnosed with liver lesions and bone lesions and is currently under the care of oncology. Patient has planned liver biopsy to evaluate metastasis. Patient has not obtain the biopsy due to being diagnosed with Covid and having a delay in treatment and evaluation. Patient states that currently he feels better than he has in a long time. However, he states that his legs have been edematous bilaterally over the past 4 days. Patient currently takes medication for chronic pain and is on Lovenox for the PE/DVT. Patient denies any chest pain, shortness of breath, abdominal pain, nausea, vomiting, or fever. Patient reports he has a history of high blood pressure but is currently not on any medications for. PAST MEDICAL HISTORY:    Past Medical History:   Diagnosis Date    Chronic bilateral low back pain without sciatica 8/25/2021    Chronic bilateral low back pain without sciatica 8/25/2021    Essential hypertension     Pure hypercholesterolemia 1/12/2018     PAST SURGICAL HISTORY:  History reviewed. No pertinent surgical history.   FAMILY HISTORY:    Family History   Problem Relation Age of Onset   [address] [place] [address] Cancer Mother     No Known Problems Brother     Other Sister     Cancer Sister     No Known Problems Sister     No Known Problems Brother      SOCIAL HISTORY:    Social History     Socioeconomic History    Marital status: Single     Spouse name: Not on file    Number of children: Not on file    Years of education: Not on file    Highest education level: Not on file   Occupational History    Not on file   Tobacco Use    Smoking status: Former Smoker     Packs/day: 1.00     Years: 10.00     Pack years: 10.00     Types: Cigarettes     Start date: 1979     Quit date: 1988     Years since quittin.9    Smokeless tobacco: Never Used   Vaping Use    Vaping Use: Never used   Substance and Sexual Activity    Alcohol use: No    Drug use: No    Sexual activity: Not on file   Other Topics Concern    Not on file   Social History Narrative    Not on file     Social Determinants of Health     Financial Resource Strain: Low Risk     Difficulty of Paying Living Expenses: Not hard at all   Food Insecurity: No Food Insecurity    Worried About [address] [employer] in the Last Year: Never true    [facility] in the Last Year: Never true   Transportation Needs: No Transportation Needs    Lack of Transportation (Medical): No    Lack of Transportation (Non-Medical):  No   Physical Activity:     Days of Exercise per Week: Not on file    Minutes of Exercise per Session: Not on file   Stress:     Feeling of Stress : Not on file   Social Connections:     Frequency of Communication with Friends and Family: Not on file    Frequency of Social Gatherings with Friends and Family: Not on file    Attends Latter day Services: Not on file    Active Member of Clubs or Organizations: Not on file    Attends Club or Organization Meetings: Not on file    Marital Status: Not on file   Intimate Partner Violence:     Fear of Current or Ex-Partner: Not on file    Emotionally Abused: Not on file    Physically Abused: Not on file    Sexually Abused: Not on file   Housing Stability:     Unable to Pay for Housing in the Last Year: Not on file    Number of Places Lived in the Last Year: Not on file    Unstable Housing in the Last Year: Not on file     MEDICATIONS:   Prior to Admission medications    Medication Sig Start Date End Date Taking? Authorizing Provider   furosemide (LASIX) 40 MG tablet Take 1 tablet by mouth daily for 7 days 12/10/21 12/17/21 Yes Jessy Rojas MD   enoxaparin (LOVENOX) 100 MG/ML injection Inject 1 mg/kg into the skin 2 times daily   Yes Historical Provider, MD   oxyCODONE-acetaminophen (PERCOCET) 5-325 MG per tablet Take 0.5-1 tablets by mouth every 8 hours as needed for Pain for up to 30 days. 12/1/21 12/31/21 Yes LYUDMILA Casper CNP   mirtazapine (REMERON) 15 MG tablet Take 0.5 tablets by mouth nightly 12/1/21   LYUDMILA Casper CNP   rivaroxaban (XARELTO) 15 MG TABS tablet Take 1 tablet by mouth daily (with breakfast) for 21 days 11/18/21 12/9/21  Daphne Keenan DO       ALLERGIES: Patient has no known allergies. REVIEW OF SYSTEM:   Review of Systems   Constitutional: Negative for activity change, chills, fatigue and fever. HENT: Negative for congestion, ear pain, rhinorrhea and sore throat. Eyes: Negative for photophobia and visual disturbance. Respiratory: Negative for cough, shortness of breath and wheezing. Cardiovascular: Positive for leg swelling. Negative for chest pain. Gastrointestinal: Negative for abdominal pain, diarrhea, nausea and vomiting. Endocrine: Negative for polydipsia, polyphagia and polyuria. Genitourinary: Negative for dysuria, flank pain, hematuria and urgency. Musculoskeletal: Positive for arthralgias and myalgias. Negative for back pain and neck stiffness. Skin: Negative for rash and wound. Allergic/Immunologic: Negative for immunocompromised state. Neurological: Negative for dizziness and headaches. Psychiatric/Behavioral: Negative for behavioral problems and confusion.          OBJECTIVE  PHYSICAL EXAM: /88   Pulse 127   Temp 98.2 °F (36.8 °C) (Oral)   Resp (!) 37 Ht 5' 10\" (1.778 m)   Wt 171 lb (77.6 kg)   SpO2 92%   BMI 24.54 kg/m²     Physical Exam  Vitals and nursing note reviewed. Constitutional:       Appearance: He is well-developed. HENT:      Head: Normocephalic and atraumatic. Nose: Nose normal.      Mouth/Throat:      Mouth: Mucous membranes are moist.   Eyes:      Pupils: Pupils are equal, round, and reactive to light. Cardiovascular:      Rate and Rhythm: Regular rhythm. Tachycardia present. Heart sounds: Normal heart sounds. Pulmonary:      Effort: Pulmonary effort is normal. No respiratory distress. Breath sounds: Normal breath sounds. No wheezing. Abdominal:      General: Bowel sounds are normal.      Palpations: Abdomen is soft. There is no mass. Tenderness: There is no abdominal tenderness. Musculoskeletal:         General: Normal range of motion. Cervical back: Normal range of motion. Right lower leg: 3+ Pitting Edema present. Left lower leg: 3+ Pitting Edema present. Lymphadenopathy:      Cervical: No cervical adenopathy. Skin:     General: Skin is warm and dry. Capillary Refill: Capillary refill takes less than 2 seconds. Neurological:      Mental Status: He is alert and oriented to person, place, and time. Deep Tendon Reflexes: Reflexes normal.   Psychiatric:         Thought Content: Thought content normal.           DATA:     Diagnostic tests reviewed for today's visit:    Most recent labs and imaging results reviewed.      LABS:    Recent Results (from the past 24 hour(s))   EKG 12 Lead - Chest Pain    Collection Time: 12/10/21  5:04 PM   Result Value Ref Range    Ventricular Rate 118 BPM    Atrial Rate 118 BPM    P-R Interval 138 ms    QRS Duration 84 ms    Q-T Interval 310 ms    QTc Calculation (Bazett) 434 ms    P Axis 37 degrees    R Axis 8 degrees    T Axis 28 degrees   Comprehensive Metabolic Panel    Collection Time: 12/10/21  5:15 PM   Result Value Ref Range    Sodium 137 135 - 144 mEq/L    Potassium 4.1 3.4 - 4.9 mEq/L    Chloride 100 95 - 107 mEq/L    CO2 22 20 - 31 mEq/L    Anion Gap 15 9 - 15 mEq/L    Glucose 118 (H) 70 - 99 mg/dL    BUN 20 8 - 23 mg/dL    CREATININE 0.42 (L) 0.70 - 1.20 mg/dL    GFR Non-African American >60.0 >60    GFR  >60.0 >60    Calcium 8.3 (L) 8.5 - 9.9 mg/dL    Total Protein 5.5 (L) 6.3 - 8.0 g/dL    Albumin 2.2 (L) 3.5 - 4.6 g/dL    Total Bilirubin 0.3 0.2 - 0.7 mg/dL    Alkaline Phosphatase 1,402 (H) 35 - 104 U/L    ALT 64 (H) 0 - 41 U/L    AST 82 (H) 0 - 40 U/L    Globulin 3.3 2.3 - 3.5 g/dL   CBC Auto Differential    Collection Time: 12/10/21  5:15 PM   Result Value Ref Range    WBC 10.9 (H) 4.8 - 10.8 K/uL    RBC 3.69 (L) 4.70 - 6.10 M/uL    Hemoglobin 10.5 (L) 14.0 - 18.0 g/dL    Hematocrit 32.9 (L) 42.0 - 52.0 %    MCV 89.2 80.0 - 100.0 fL    MCH 28.5 27.0 - 31.3 pg    MCHC 32.0 (L) 33.0 - 37.0 %    RDW 18.3 (H) 11.5 - 14.5 %    Platelets 922 066 - 915 K/uL   Magnesium    Collection Time: 12/10/21  5:15 PM   Result Value Ref Range    Magnesium 1.9 1.7 - 2.4 mg/dL   Troponin    Collection Time: 12/10/21  5:15 PM   Result Value Ref Range    Troponin <0.010 0.000 - 0.010 ng/mL   TSH without Reflex    Collection Time: 12/10/21  5:15 PM   Result Value Ref Range    TSH 2.600 0.440 - 3.860 uIU/mL   Brain Natriuretic Peptide    Collection Time: 12/10/21  5:15 PM   Result Value Ref Range    Pro- pg/mL   APTT    Collection Time: 12/10/21  5:15 PM   Result Value Ref Range    aPTT 38.5 (H) 24.4 - 36.8 sec   Protime-INR    Collection Time: 12/10/21  5:15 PM   Result Value Ref Range    Protime 14.8 12.3 - 14.9 sec    INR 1.2    COVID-19, Rapid    Collection Time: 12/10/21  5:19 PM    Specimen: Nasopharyngeal Swab   Result Value Ref Range    SARS-CoV-2, NAAT Not Detected Not Detected   POCT CREATININE    Collection Time: 12/10/21  5:23 PM   Result Value Ref Range    POC CREATININE WHOLE BLOOD 0.4        IMAGING:  CTA Chest W WO  (PE study)    Result Date: 12/10/2021  EXAMINATION:  CHEST CTA WITH CONTRAST (PULMONARY EMBOLISM PROTOCOL) CLINICAL HISTORY:  Shortness of breath. Leg swelling. Hypoxia. History of DVT and pulmonary embolism. Technique:  Spiral axial CT acquisition of the chest from the thoracic inlet to the upper abdomen following IV contrast.  2-D images were reconstructed in the sagittal and coronal planes. 3-D MIPS images were generated in the coronal and axial planes. Images were reviewed on the PACS workstation. All images including the 3-D MIPS were personally archived. Repeat contrast bolusing performed due to suboptimal timing on the initial scan. Contrast:  IV administration of 200 ml Isovue 300 All CT scans at this facility use dose modulation, iterative reconstruction, and/or weight based dosing when appropriate to reduce radiation dose to as low as reasonably achievable. Comparison:  CTA 11/16/2021. RESULT: Limitations: Motion artifact. Bolus timing. Lines, tubes, and devices:  None. Evaluation for thromboembolic disease:  Positive for thromboembolic disease. Apparent new filling defect involving the left lobar branches of the left upper lobe and left lower lobe. Other more distal emboli involving left upper and lower lobe segmental and subsegmental branches. Also positive involving some right lower lobe subsegmental branches. Overall, the emboli on the left appear slightly worsened from 11/16/2021. Findings suggestive of right heart strain with bowing of the interventricular septum  toward the left ventricle and RV LV ratio measuring around 1. Lung parenchyma and pleura: Moderate bilateral pleural effusions, worsened from prior with associated compressive atelectasis. Other ill-defined patchy opacities which could represent infectious/inflammatory etiology, pulmonary infarct, etc. Thoracic inlet, heart, and mediastinum:  Visualized thyroid unremarkable. No axillary, mediastinal, or hilar lymphadenopathy.  Normal thoracic aorta. Normal pulmonary artery size. Normal heart size. Scattered coronary artery calcifications. No pericardial  effusion or thickening. Esophagus nondilated. Bones:  Extensive sclerotic osseous metastatic disease involving the spine with pathologic fracture of T5 and also probable milder compression fractures of T9 and T11, unchanged. Sclerotic osseous metastatic disease involving the sternum, ribs, right shoulder, etc. Soft tissues: Unremarkable. Upper abdomen:  Multiple low-attenuation lesions within the liver system with known metastatic disease. Pulmonary emboli, which appear worsened on the left as discussed. Findings suspicious for right heart strain. Nonspecific lung opacities as discussed. Osseous metastatic disease with pathologic T5 compression fracture. Liver metastatic disease. US DUP LOWER EXTREMITIES BILATERAL VENOUS    Result Date: 12/10/2021  EXAMINATION: US DUP LOWER EXTREMITIES BILATERAL VENOUS CLINICAL HISTORY: Leg swelling. COMPARISON: 11/5/2021. TECHNIQUE: The bilateral lower extremity veins were evaluated with color doppler, gray scale imaging and spectral analysis while using compression and augmentation when possible. RESULT: Positive for DVT involving the right distal femoral vein through the popliteal vein, similar to 11/5/2021. Otherwise negative involving the right leg. Evaluation of the left lower extremity veins from the thigh to the knee shows normal phasic flow, normal augmentation of the Doppler signal, and normal compression of the deep veins. There is no sonographic evidence for acute deep venous thrombosis from  the left groin to the popliteal region. Positive for DVT involving the right calf veins. Negative for DVT involving the segmentally visualized left calf veins. Positive for DVT involving the right distal femoral vein, popliteal vein, and right calf veins, similar to prior. Negative for DVT involving the left leg.        VTE Prophylaxis: Patient on Lovenox at home. We will start heparin drip for DVT/PE. ASSESSMENT AND PLAN    Active Problems:  DVT/PE: Bilateral PE and right DVT post Covid. Patient currently on Lovenox at home. Patient tachycardic. Chest CT shows worsening PE with heart strain, currently on Lovenox. We will start heparin drip. We will consult cardiology. Consideration for IVC filter. We will get echo. Pleural effusion: Bilateral pleural effusion seen on chest CTA. Patient given Lasix 40 mg in ED. We will monitor at this time. If needed we will consult IR. Liver lesion/bone lesions: History of known liver lesions of bone lesions. Under care oncology. Alk phos 1402 at baseline. We will consult oncology. We will consult palliative care. Plan of care discussed with: patient and family    SIGNATURE: LYUDMILA Andujar - CNP  DATE: December 10, 2021  TIME: 10:36 PM     LEEANNE Bradford MD - supervising physician

## 2021-12-12 NOTE — DISCHARGE INSTR - ACTIVITY
May resume activity as tolerated, no restrictions    Please call oxygen company when you get home for them to deliver your home oxygen

## 2021-12-12 NOTE — PROGRESS NOTES
Home O2 eval. Resting room air spo2 92%. Walking room air spo2 86%. Walking 2L spo2 92%. Patient qualifies for home oxygen 2L walking.  RW RRT

## 2021-12-12 NOTE — FLOWSHEET NOTE
AM assessment completed. Patient resting in bed at this time. Denies any chest pain. Remains NSR/ST on the monitor. No edema noted. Pedal pulses palpable. Shortness of breath noted with exertion. Lungs are diminished bilaterally. SATS 88% on RA. Placed on 2L NC and SATS improved to 94-95%. He is A/Ox3 and can be up in the room with a stand-by assist. Denies any pain with elimination. Last BM 12/11/21. Skin remains warm, dry and intact. #20g Sl to right AC with a heparin gtt infusing at 22units=17.1ml/hr. Vital signs stable. Call light remains in reach.  Electronically signed by Kirsten Andrade RN on 12/12/2021 at 9:16 AM

## 2021-12-12 NOTE — PROGRESS NOTES
Chief Complaint   Patient presents with    Leg Swelling     +2 pitting edema to BLE, known PE's from COVID-19 taking lovenox BID         Patient is a 59 y.o. male who presents with a chief complaint of right lower extremity edema. . Patient is followed on a regular basis by Dr. Debbie Willoughby MD. Patient with history of metastatic cancer status post recent presentation to emergency approximate month ago noted to have COVID-19 pneumonia as well as right lower extremity DVT as well as bilateral pulmonary embolism in his segmental and subsegmental areas. He was placed on Lovenox injections which she has been compliant with. Apparently home visiting nurse called the ambulance to bring patient in. Patient states he was not aware of what was happening. He denies any significant shortness of breath, chest pain, lightheadedness dizziness or syncope. Denies any lower extremity pain. Does have mild right lower extremity edema. 12/12/21: Pt assessed on 1 W, sitting up in bed with no complaints. On heparin drip with plans to discontinue today and hopefully discharge patient. Monitored on telemetry sinus tach in the 100s. Patient denies chest pain, shortness of breath, palpitations.       Past Medical History:   Diagnosis Date    Chronic bilateral low back pain without sciatica 8/25/2021    Chronic bilateral low back pain without sciatica 8/25/2021    Essential hypertension     Pure hypercholesterolemia 1/12/2018      Patient Active Problem List   Diagnosis    Benign prostatic hyperplasia with weak urinary stream    Non-recurrent unilateral inguinal hernia without obstruction or gangrene    Pure hypercholesterolemia    Right inguinal hernia    Essential hypertension    Unintentional weight loss    Chronic bilateral low back pain without sciatica    COVID-19    Multiple subsegmental pulmonary emboli without acute cor pulmonale (HCC)    Septicemia ([address]    DVT of deep femoral vein, right ([address]    Pleural effusion    Liver lesion       History reviewed. No pertinent surgical history. Social History     Socioeconomic History    Marital status: Single     Spouse name: None    Number of children: None    Years of education: None    Highest education level: None   Occupational History    None   Tobacco Use    Smoking status: Former Smoker     Packs/day: 1.00     Years: 10.00     Pack years: 10.00     Types: Cigarettes     Start date: 1979     Quit date: 1988     Years since quittin.9    Smokeless tobacco: Never Used   Vaping Use    Vaping Use: Never used   Substance and Sexual Activity    Alcohol use: No    Drug use: No    Sexual activity: None   Other Topics Concern    None   Social History Narrative    None     Social Determinants of Health     Financial Resource Strain: Low Risk     Difficulty of Paying Living Expenses: Not hard at all   Food Insecurity: No Food Insecurity    Worried About Running Out of Food in the Last Year: Never true    Ti of Food in the Last Year: Never true   Transportation Needs: No Transportation Needs    Lack of Transportation (Medical): No    Lack of Transportation (Non-Medical):  No   Physical Activity:     Days of Exercise per Week: Not on file    Minutes of Exercise per Session: Not on file   Stress:     Feeling of Stress : Not on file   Social Connections:     Frequency of Communication with Friends and Family: Not on file    Frequency of Social Gatherings with Friends and Family: Not on file    Attends Religion Services: Not on file    Active Member of Clubs or Organizations: Not on file    Attends Club or Organization Meetings: Not on file    Marital Status: Not on file   Intimate Partner Violence:     Fear of Current or Ex-Partner: Not on file    Emotionally Abused: Not on file    Physically Abused: Not on file    Sexually Abused: Not on file   Housing Stability:     Unable to Pay for Housing in the Last Year: Not on file  Number of Places Lived in the Last Year: Not on file    Unstable Housing in the Last Year: Not on file       Family History   Problem Relation Age of Onset    Cancer Mother     No Known Problems Brother     Other Sister     Cancer Sister     No Known Problems Sister     No Known Problems Brother        Current Facility-Administered Medications   Medication Dose Route Frequency Provider Last Rate Last Admin    oxyCODONE-acetaminophen (PERCOCET) 5-325 MG per tablet 1 tablet  1 tablet Oral Q8H PRN Jennifer Cleveland APRN - NP   0.5 tablet at 12/11/21 2045    sodium chloride flush 0.9 % injection 5-40 mL  5-40 mL IntraVENous 2 times per day Nicoletto Bolzan-Roche, APRN - CNP   10 mL at 12/11/21 2032    sodium chloride flush 0.9 % injection 5-40 mL  5-40 mL IntraVENous PRN Nicoletto Bolzan-Roche, APRN - CNP        0.9 % sodium chloride infusion  25 mL IntraVENous PRN Nicoletto Bolzan-Roche, APRN - CNP        ondansetron (ZOFRAN-ODT) disintegrating tablet 4 mg  4 mg Oral Q8H PRN Nicoletto Bolzan-Roche, APRN - CNP        Or    ondansetron (ZOFRAN) injection 4 mg  4 mg IntraVENous Q6H PRN Nicoletto Bolzan-Roche, APRN - CNP        polyethylene glycol (GLYCOLAX) packet 17 g  17 g Oral Daily PRN Nicoletto Bolzan-Roche, APRN - CNP        acetaminophen (TYLENOL) tablet 650 mg  650 mg Oral Q6H PRN Nicoletto Bolzan-Roche, APRN - CNP        Or    acetaminophen (TYLENOL) suppository 650 mg  650 mg Rectal Q6H PRN Nicoletto Bolzan-Roche, APRN - CNP        heparin (porcine) injection 6,210 Units  80 Units/kg IntraVENous PRN Nicoletto Bolzan-Roche, APRN - CNP   6,210 Units at 12/11/21 0712    heparin (porcine) injection 3,100 Units  40 Units/kg IntraVENous PRN Nicoletto Bolzan-Roche, APRN - CNP        heparin 25,000 units in dextrose 5% 250 mL (premix) infusion  5-30 Units/kg/hr IntraVENous Continuous Nicoletto Bolzan-Roche, APRN - CNP 17.1 mL/hr at 12/12/21 0541 22.036 Units/kg/hr at 12/12/21 0541       ALLERGIES: Patient has no known allergies. Review of Systems   Constitutional: Negative for activity change, appetite change, chills, diaphoresis, fatigue and fever. HENT: Negative for facial swelling, nosebleeds and trouble swallowing. Eyes: Negative for discharge, redness and visual disturbance. Respiratory: Negative. Negative for apnea, cough, choking, chest tightness, shortness of breath, wheezing and stridor. Cardiovascular: Negative. Gastrointestinal: Negative. Genitourinary: Negative for difficulty urinating, flank pain, frequency and hematuria. Musculoskeletal: Negative. Negative for neck pain. Skin: Negative. Neurological: Negative for dizziness, seizures, syncope, speech difficulty, weakness and light-headedness. Psychiatric/Behavioral: Negative for behavioral problems, confusion and sleep disturbance. The patient is not nervous/anxious. VITALS:  Blood pressure 133/89, pulse 118, temperature 97.3 °F (36.3 °C), temperature source Oral, resp. rate 20, height 5' 10\" (1.778 m), weight 153 lb 14.1 oz (69.8 kg), SpO2 94 %. Body mass index is 22.08 kg/m². Physical Exam  Constitutional:       Appearance: He is well-developed. He is not diaphoretic. HENT:      Head: Normocephalic and atraumatic. Eyes:      Conjunctiva/sclera: Conjunctivae normal.      Pupils: Pupils are equal, round, and reactive to light. Neck:      Thyroid: No thyromegaly. Vascular: Normal carotid pulses. No carotid bruit, hepatojugular reflux or JVD. Trachea: No tracheal deviation. Cardiovascular:      Rate and Rhythm: Normal rate and regular rhythm. Chest Wall: PMI is not displaced. Pulses: Intact distal pulses. Carotid pulses are 3+ on the right side and 3+ on the left side. Radial pulses are 3+ on the right side and 3+ on the left side. Femoral pulses are 3+ on the right side and 3+ on the left side.        Popliteal pulses are 3+ on the right side and 3+ on the left side. Dorsalis pedis pulses are 3+ on the right side and 3+ on the left side. Posterior tibial pulses are 3+ on the right side and 3+ on the left side. Heart sounds: Normal heart sounds, S1 normal and S2 normal. No murmur heard. No friction rub. No gallop. No S3 or S4 sounds. Pulmonary:      Effort: Pulmonary effort is normal. No tachypnea or respiratory distress. Breath sounds: Normal breath sounds. No wheezing, rhonchi or rales. Chest:      Chest wall: No tenderness. Abdominal:      General: Bowel sounds are normal. There is no distension. Palpations: Abdomen is soft. Tenderness: There is no abdominal tenderness. There is no guarding or rebound. Musculoskeletal:         General: Swelling present. No tenderness. Normal range of motion. Skin:     General: Skin is warm. Findings: No erythema or rash. Nails: There is no clubbing. Neurological:      Mental Status: He is alert and oriented to person, place, and time. Cranial Nerves: No cranial nerve deficit. Coordination: Coordination normal.   Psychiatric:         Behavior: Behavior normal.         Thought Content:  Thought content normal.         Judgment: Judgment normal.         LABS:  Recent Results (from the past 24 hour(s))   HEPARIN LEVEL/ANTI-XA    Collection Time: 12/11/21  1:18 PM   Result Value Ref Range    Anti-XA Unfrac Heparin 0.47 IU/mL   HEPARIN LEVEL/ANTI-XA    Collection Time: 12/11/21  8:32 PM   Result Value Ref Range    Anti-XA Unfrac Heparin 0.36 IU/mL     Troponin:   Lab Results   Component Value Date    TROPONINI <0.010 12/10/2021       EKG: normal sinus rhythm, nonspecific ST and T waves changes      ASSESSMENT:    Active Hospital Problems    Diagnosis Date Noted    DVT of deep femoral vein, right ([facility] [address] [I82.411] 12/10/2021     Priority: Low    Pleural effusion [J90] 12/10/2021     Priority: Low    Liver lesion [K76.9] 12/10/2021     Priority: Low     Subacute provoked extensive right lower extremity DVT with mild lower extremity edema. Likely due to underlying cancer    Bilateral segmental/subsegmental pulmonary embolism. History of liver cancer with mets    Essential hypertension    Hyperlipidemia    Status post recent COVID-19 pneumonia. PLAN:   1. As always, aggressive risk factor modification is strongly recommended. We should adhere to the JNC VIII guidelines for HTN management and the NCEPATP III guidelines for LDL-C management. 2. Continue with anticoagulation. Transition to oral anticoagulation prior to discharge. Continue PO Xarelto at home   3. No indication for IVC filter at this time. DVT and PE appears unchanged as compared to imaging performed a month ago. No extension of thromboembolic disease suggest anticoagulation failure/breakthrough. 4. Continue to monitor on telemetry  5. GI/DVT prophylaxis  6. Heme-onc recommendations  7. Stable for DC from cardiology standpoint    Electronically signed by LYUDMILA James CNP on 12/12/2021 at 9:11 AM      Attending Supervising [de-identified] Attestation Statement  The patient is a 59 y.o. male. I have performed a history and physical examination of the patient. I discussed the case with the nurse practitioner. I reviewed the patient's Past Medical History, Past Surgical History, Medications, and Allergies.      Physical Exam:  Vitals:    12/11/21 1920 12/12/21 0438 12/12/21 0739 12/12/21 0848   BP: 130/78  133/89    Pulse: 112  113 118   Resp: 18  20    Temp: 98.2 °F (36.8 °C)  97.3 °F (36.3 °C)    TempSrc: Oral  Oral    SpO2: 94%  (!) 88% 94%   Weight:  153 lb 14.1 oz (69.8 kg)     Height:           Review of Systems - Respiratory ROS: no cough, shortness of breath, or wheezing  Cardiovascular ROS: no chest pain or dyspnea on exertion  Gastrointestinal ROS: no abdominal pain, change in bowel habits, or black or bloody stools    Pulmonary/Chest: clear to auscultation bilaterally- no wheezes, rales or rhonchi, normal air movement, no respiratory distress  Cardiovascular: normal rate, normal S1 and S2, no gallops, intact distal pulses and no carotid bruits  Abdomen: soft, non-tender, non-distended, normal bowel sounds, no masses or organomegaly    Active Hospital Problems    Diagnosis Date Noted    DVT of deep femoral vein, right (HCC) [I82.411] 12/10/2021     Priority: Low    Pleural effusion [J90] 12/10/2021     Priority: Low    Liver lesion [K76.9] 12/10/2021     Priority: Low        I reviewed and agree with the findings and plan documented in her note . A:   Subacute provoked extensive right lower extremity DVT with mild lower extremity edema. Likely due to underlying cancer. Unchanged as compared to US  In 11/2021     Bilateral segmental/subsegmental pulmonary embolism. - unchanged     History of liver cancer with mets     Essential hypertension     Hyperlipidemia     Status post recent COVID-19 pneumonia.           PLAN:   1. As always, aggressive risk factor modification is strongly recommended. We should adhere to the JNC VIII guidelines for HTN management and the NCEPATP III guidelines for LDL-C management. 2. Continue with anticoagulation. Transition to oral anticoagulation prior to discharge. Start xarelto  20mg daily. 3. No indication for IVC filter at this time. DVT and PE appears unchanged as compared to imaging performed a month ago. No extension of thromboembolic disease suggest anticoagulation failure/breakthrough. 4. Continue to monitor on telemetry  5. GI/DVT prophylaxis  6. Heme-onc recommendations  7.  Stable for discharge         Electronically signed by Willy Maxwell DO on 12/12/21 at 1:20 PM EST

## 2021-12-12 NOTE — PROGRESS NOTES
Case DW cardiology, no change in clots so xarelto failure unlikely per our discussion; no need for IVC filter and restart xarelto per our discussion.      Dc home, home o2 eval.     Ilsa Guzman MD

## 2021-12-12 NOTE — DISCHARGE SUMMARY
Hospital Medicine Discharge Summary    Franklyn Smith  :  1960  MRN:  [id number]    Admit date:  12/10/2021  Discharge date:  2021    Admitting Physician: Yobany Estevez MD  Primary Care Physician:  Rich Jaramillo MD    Franklyn Smith is a 59 y.o. male that was admitted and treated at [facility] for the following medical issues: Active Problems:    DVT of deep femoral vein, right (HCC)    Pleural effusion    Liver lesion  Resolved Problems:    * No resolved hospital problems. *      Discharge Diagnoses: Active Problems:    DVT of deep femoral vein, right (HCC)    Pleural effusion    Liver lesion  Resolved Problems:    * No resolved hospital problems. *    Chief Complaint   Patient presents with    Leg Swelling     +2 pitting edema to BLE, known PE's from COVID-19 taking lovenox BID        Hospital Course:   Franklyn Smith is a 59 y.o. male who was admitted to the hospital with PE and DVT on imaging. Seen by cardiology for consideration of IVC filter. Based on review of images with previous images, no change in current images, no evidence to suggest anticoagulation failure as per discussion with cardiology. Patient subsequently discharged and advised to continue on anticoagulation. dc'd w outpt follow up. Pt was discharge in a stable condition. /89   Pulse 118   Temp 97.3 °F (36.3 °C) (Oral)   Resp 20   Ht 5' 10\" (1.778 m)   Wt 153 lb 14.1 oz (69.8 kg)   SpO2 94%   BMI 22.08 kg/m²     Patient was seen by the following consultants while admitted to [facility]:   Consults:  IP CONSULT TO CASE MANAGEMENT  IP CONSULT TO PALLIATIVE CARE  IP CONSULT TO HEM/ONC  IP CONSULT TO CARDIOLOGY    Significant Diagnostic Studies:    Refer to chart if  CTA Chest W WO  (PE study)    Result Date: 12/10/2021  EXAMINATION:  CHEST CTA WITH CONTRAST (PULMONARY EMBOLISM PROTOCOL) CLINICAL HISTORY:  Shortness of breath. Leg swelling. Hypoxia.  History of DVT and pulmonary embolism. Technique:  Spiral axial CT acquisition of the chest from the thoracic inlet to the upper abdomen following IV contrast.  2-D images were reconstructed in the sagittal and coronal planes. 3-D MIPS images were generated in the coronal and axial planes. Images were reviewed on the PACS workstation. All images including the 3-D MIPS were personally archived. Repeat contrast bolusing performed due to suboptimal timing on the initial scan. Contrast:  IV administration of 200 ml Isovue 300 All CT scans at this facility use dose modulation, iterative reconstruction, and/or weight based dosing when appropriate to reduce radiation dose to as low as reasonably achievable. Comparison:  CTA 11/16/2021. RESULT: Limitations: Motion artifact. Bolus timing. Lines, tubes, and devices:  None. Evaluation for thromboembolic disease:  Positive for thromboembolic disease. Apparent new filling defect involving the left lobar branches of the left upper lobe and left lower lobe. Other more distal emboli involving left upper and lower lobe segmental and subsegmental branches. Also positive involving some right lower lobe subsegmental branches. Overall, the emboli on the left appear slightly worsened from 11/16/2021. Findings suggestive of right heart strain with bowing of the interventricular septum  toward the left ventricle and RV LV ratio measuring around 1. Lung parenchyma and pleura: Moderate bilateral pleural effusions, worsened from prior with associated compressive atelectasis. Other ill-defined patchy opacities which could represent infectious/inflammatory etiology, pulmonary infarct, etc. Thoracic inlet, heart, and mediastinum:  Visualized thyroid unremarkable. No axillary, mediastinal, or hilar lymphadenopathy. Normal thoracic aorta. Normal pulmonary artery size. Normal heart size. Scattered coronary artery calcifications. No pericardial  effusion or thickening. Esophagus nondilated.  Bones: Extensive sclerotic osseous metastatic disease involving the spine with pathologic fracture of T5 and also probable milder compression fractures of T9 and T11, unchanged. Sclerotic osseous metastatic disease involving the sternum, ribs, right shoulder, etc. Soft tissues: Unremarkable. Upper abdomen:  Multiple low-attenuation lesions within the liver system with known metastatic disease. Pulmonary emboli, which appear worsened on the left as discussed. Findings suspicious for right heart strain. Nonspecific lung opacities as discussed. Osseous metastatic disease with pathologic T5 compression fracture. Liver metastatic disease. Limited 2D Echo w Doppler w Color Flow    Result Date: 12/11/2021  Transthoracic Echocardiography Report (TTE)  Demographics   Patient Name    Goyo Ashraf Gender                Male   Patient Number  [id number]     Race                                                  Ethnicity   Visit Number    [id number]    Room Number           W170   Corporate ID                 Date of Study         12/11/2021   Accession       [id number]   Referring Physician  Number   Date of Birth   1960   Sonographer           Manuel Marcano   Age             59 year(s)   Interpreting          [facility]) Cardiology                               Physician             Evelyn Ruelas., DO  Procedure Type of Study   TTE procedure:2D-ECHO LIMITED STUDY. Procedure Date Date: 12/11/2021 Start: 08:47 AM Study Location: Portable Technical Quality: Adequate visualization Indications:Pulmonary embolus. Patient Status: Routine Height: 71 inches Weight: 168 pounds BSA: 1.96 m^2 BMI: 23.43 kg/m^2 BP: 127/72 mmHg  Conclusions   Summary  There is mild tricuspid regurgitation with estimated RVSP of 36 mm Hg. Normal left ventricle structure and function. Normal right ventricle structure and function. Normal right ventricle systolic pressure. No evidence of pericardial effusion. NO RV STRAIN seen.    Signature ----------------------------------------------------------------  Electronically signed by Yancy Miguel DO(Interpreting  physician) on 12/11/2021 10:35 AM  ----------------------------------------------------------------   Findings  Left Ventricle Normal left ventricle structure and function. Right Ventricle Normal right ventricle structure and function. Normal right ventricle systolic pressure. Tricuspid Valve Tricuspid valve is structurally normal. There is mild tricuspid regurgitation with estimated RVSP of 36 mm Hg. Pericardial Effusion No evidence of pericardial effusion. M-Mode Measurements (cm)   LVIDd: 2.96 cm                                    LVIDs: 2.04 cm  IVSd: 1.19 cm                                     IVSs: 1.35 cm  LVPWd: 0.77 cm  Rt. Vent. Dimension: 2.76 cm  Doppler Measurements:   TR Velocity:2.59 m/s  TR Gradient:26.78 mmHg                                     Estimated RAP:10 mmHg                                     RVSP:36.79 mmHg  Valves  Tricuspid Valve   Estimated RVSP: 36.79 mmHg              Estimated RAP: 10 mmHg  TR Velocity: 2.59 m/s                   TR Gradient: 26.78 mmHg   Pulmonic Valve            Estimated PASP: 36.78 mmHg  Structures  Left Ventricle   Diastolic Dimension: 1.11 cm          Systolic Dimension: 7.65 cm  Septum Diastolic: 4.60 cm             Septum Systolic: 2.31 cm  PW Diastolic: 7.42 cm                                        FS: 31.1 %  LV EDV/LV EDV Index: 34.01 ml/17 m^2  LV ESV/LV ESV Index: 13.4 ml/7 m^2  EF Calculated: 60.6 %   Right Atrium   RA Systolic Pressure: 10 mmHg   Right Ventricle   Diastolic Dimension: 8.57 cm                                    RV Systolic Pressure: 95.37 mmHg      US DUP LOWER EXTREMITIES BILATERAL VENOUS    Result Date: 12/10/2021  EXAMINATION: US DUP LOWER EXTREMITIES BILATERAL VENOUS CLINICAL HISTORY: Leg swelling. COMPARISON: 11/5/2021.  TECHNIQUE: The bilateral lower extremity veins were evaluated with color doppler, gray scale imaging and spectral analysis while using compression and augmentation when possible. RESULT: Positive for DVT involving the right distal femoral vein through the popliteal vein, similar to 11/5/2021. Otherwise negative involving the right leg. Evaluation of the left lower extremity veins from the thigh to the knee shows normal phasic flow, normal augmentation of the Doppler signal, and normal compression of the deep veins. There is no sonographic evidence for acute deep venous thrombosis from  the left groin to the popliteal region. Positive for DVT involving the right calf veins. Negative for DVT involving the segmentally visualized left calf veins. Positive for DVT involving the right distal femoral vein, popliteal vein, and right calf veins, similar to prior. Negative for DVT involving the left leg. Discharge Medications:         Medication List      START taking these medications    furosemide 40 MG tablet  Commonly known as: Lasix  Take 1 tablet by mouth daily for 7 days        CHANGE how you take these medications    * rivaroxaban 20 MG Tabs tablet  Commonly known as: XARELTO  Take 1 tablet by mouth daily PLEASE FOLLOW UP WITH YOUR PHYSICIAN (EITHER  [facility] PCP)  YOU WILL NEED ADDITIONAL REFILLS  What changed:   · medication strength  · how much to take  · when to take this  · additional instructions     * rivaroxaban 20 MG Tabs tablet  Commonly known as: XARELTO  Take 1 tablet by mouth daily  What changed: You were already taking a medication with the same name, and this prescription was added. Make sure you understand how and when to take each. * This list has 2 medication(s) that are the same as other medications prescribed for you. Read the directions carefully, and ask your doctor or other care provider to review them with you.             CONTINUE taking these medications    mirtazapine 15 MG tablet  Commonly known as: Remeron  Take 0.5 tablets by mouth nightly oxyCODONE-acetaminophen 5-325 MG per tablet  Commonly known as: Percocet  Take 0.5-1 tablets by mouth every 8 hours as needed for Pain for up to 30 days. STOP taking these medications    enoxaparin 100 MG/ML injection  Commonly known as: LOVENOX           Where to Get Your Medications      These medications were sent to [address] [address] [facility] [place], [address]  [address], [address] [facility] [address]    Phone: [phone]   · rivaroxaban 20 MG Tabs tablet  · rivaroxaban 20 MG Tabs tablet     Information about where to get these medications is not yet available    Ask your nurse or doctor about these medications  · furosemide 40 MG tablet           Disposition:   If discharged to Home, Any [facility] needs that were indicated and/or required as been addressed and set up by Social Work. Condition at discharge: Pt was medically stable at the time of discharge. Activity: activity as tolerated    Total time taken for discharging this patient: 40 minutes. Greater than 70% of time was spent focused exclusively on this patient. Time was taken to review chart, discuss plans with consultants, reconciling medications, discussing plan answering questions with patient.      Signed:  Cori Herrera MD

## 2021-12-12 NOTE — FLOWSHEET NOTE
Reviewed discharge instructions with patient and his niece who is POA. Discussed follow up appointments and home going care. Discussed new medications and printed information provided regarding new medications. Aware that they need to call the oxygen company when they arrive home for delivery of their home O2. All questions answered. Awaiting for transport to take him to the main entrance for discharge to home.  Electronically signed by Sujey Ashton RN on 12/12/2021 at 6:55 PM

## 2021-12-12 NOTE — FLOWSHEET NOTE
2045 assessment reviewed. Chart and meds reviewed, prn 0.5 tablet of percocet per pt request and complaints of generalized discomfort. Iv heparin maintained per order. Electronically signed by Maria G Nunez RN on 12/12/2021 at 2:12 AM    2335 xa therapeutic, no change in heparin. XA due in 24 hours.  Electronically signed by Maria G Nunez RN on 12/12/2021 at 2:14 AM

## 2021-12-12 NOTE — CARE COORDINATION
PT FOR DISCHARGE TODAY WITH CURRENT SERVICES. CURRENT WITH [facility]. HOME O2 EVAL ORDERED. JE HUTTON PLACED ON CHART.

## 2021-12-12 NOTE — FLOWSHEET NOTE
Medicated with 1/2 tab PO percocet 5/325mg for complaints of 3/10 all over pain. No signs of any acute distress noted. Call light remains in reach.  Electronically signed by Leonarda Lowe RN on 12/12/2021 at 1:48 PM

## 2021-12-12 NOTE — DISCHARGE INSTR - COC
Continuity of Care Form    Patient Name: Franklyn Smith   :  1960  MRN:  [id number]    Admit date:  12/10/2021  Discharge date:  21    Code Status Order: Full Code   Advance Directives:      Admitting Physician: Yobany Estevez MD  PCP: Rich Jaramillo MD    Discharging Nurse: Joon Spear Unit/Room#: E635/H174-29  Discharging Unit Phone Number: [phone]    Emergency Contact:   Extended Emergency Contact Information  Primary Emergency Contact: Kamar Montes  Pierz Phone: [phone]  Relation: Other    Past Surgical History:  History reviewed. No pertinent surgical history.     Immunization History:   Immunization History   Administered Date(s) Administered    COVID-19, Pfizer, PF, 30mcg/0.3mL 10/21/2021    Zoster Recombinant (Shingrix) 2020, 01/15/2021       Active Problems:  Patient Active Problem List   Diagnosis Code    Benign prostatic hyperplasia with weak urinary stream N40.1, R39.12    Non-recurrent unilateral inguinal hernia without obstruction or gangrene K40.90    Pure hypercholesterolemia E78.00    Right inguinal hernia K40.90    Essential hypertension I10    Unintentional weight loss R63.4    Chronic bilateral low back pain without sciatica M54.50, G89.29    COVID-19 U07.1    Multiple subsegmental pulmonary emboli without acute cor pulmonale (HCC) I26.94    Septicemia (HCC) A41.9    DVT of deep femoral vein, right (HCC) I82.411    Pleural effusion J90    Liver lesion K76.9       Isolation/Infection:   Isolation            No Isolation          Patient Infection Status       Infection Onset Added Last Indicated Last Indicated By Review Planned Expiration Resolved Resolved By    None active    Resolved    COVID-19 (Rule Out) 12/10/21 12/10/21 12/10/21 COVID-19, Rapid (Ordered)   12/10/21 Rule-Out Test Resulted    COVID-19 21 COVID-19, Rapid   21     COVID-19 (Rule Out) 21 COVID-19, Rapid (Ordered) 11/16/21 Rule-Out Test Resulted            Nurse Assessment:  Last Vital Signs: /89   Pulse 118   Temp 97.3 °F (36.3 °C) (Oral)   Resp 20   Ht 5' 10\" (1.778 m)   Wt 153 lb 14.1 oz (69.8 kg)   SpO2 94%   BMI 22.08 kg/m²     Last documented pain score (0-10 scale): Pain Level: 3  Last Weight:   Wt Readings from Last 1 Encounters:   12/12/21 153 lb 14.1 oz (69.8 kg)     Mental Status:  oriented, alert, coherent, logical, thought processes intact, and able to concentrate and follow conversation    IV Access:  - None    Nursing Mobility/ADLs:  Walking   Assisted  Transfer  Assisted  Bathing  Assisted  Dressing  Assisted  Toileting  Assisted  Feeding  [address]  Assisted  Med Delivery   whole    Wound Care Documentation and Therapy:        Elimination:  Continence: Bowel: Yes  Bladder: Yes  Urinary Catheter: None   Colostomy/Ileostomy/Ileal Conduit: No       Date of Last BM: 12/11/21    Intake/Output Summary (Last 24 hours) at 12/12/2021 1735  Last data filed at 12/12/2021 0736  Gross per 24 hour   Intake 325 ml   Output 550 ml   Net -225 ml     I/O last 3 completed shifts: In: 325 [P.O.:120; I.V.:205]  Out: 550 [Urine:550]    Safety Concerns: At Risk for Falls    Impairments/Disabilities:      None    Nutrition Therapy:  Current Nutrition Therapy:   - Oral Diet:  General    Routes of Feeding: Oral  Liquids:  Thin Liquids  Daily Fluid Restriction: no  Last Modified Barium Swallow with Video (Video Swallowing Test): not done    Treatments at the Time of Hospital Discharge:   Respiratory Treatments: ***  Oxygen Therapy:  new home oxygen 2L NC  Ventilator:    - No ventilator support    Rehab Therapies: {THERAPEUTIC INTERVENTION:8715640368}  Weight Bearing Status/Restrictions: No weight bearing restirctions  Other Medical Equipment (for information only, NOT a DME order):  {EQUIPMENT:054866568}  Other Treatments: ***    Patient's personal belongings (please select all that are sent with patient):  None    RN SIGNATURE:  Electronically signed by Deidre Whipple RN on 12/12/21 at 5:37 PM EST    CASE MANAGEMENT/SOCIAL WORK SECTION    Inpatient Status Date: ***    Readmission Risk Assessment Score:  Readmission Risk              Risk of Unplanned Readmission:  13           Discharging to Facility/ Agency   Name:   Address:  Phone:  Fax:    Dialysis Facility (if applicable)   Name:  Address:  Dialysis Schedule:  Phone:  Fax:    / signature: {Esignature:[id number]}    PHYSICIAN SECTION    Prognosis: {Prognosis:9540063425}    Condition at Discharge: [address] [place] [address] Patient Condition:849644141}    Rehab Potential (if transferring to Rehab): {Prognosis:6162035205}    Recommended Labs or Other Treatments After Discharge: ***    Physician Certification: I certify the above information and transfer of Sameera Shoemaker  is necessary for the continuing treatment of the diagnosis listed and that he requires {Admit to Appropriate Level of Care:33725} for {GREATER/LESS:258069200} 30 days.      Update Admission H&P: {CHP DME Changes in YBVYJ:049347172}    PHYSICIAN SIGNATURE:  {Esignature:867630546}

## 2021-12-13 PROBLEM — M89.8X5 LYTIC BONE LESION OF HIP: Status: ACTIVE | Noted: 2021-01-01

## 2021-12-13 NOTE — PROGRESS NOTES
2021    Vascular Medicine and Interventional Radiology:    SUBJECTIVE:     Chief Complaint   Patient presents with    Pre-op Exam    Biopsy    Hepatic Disease       TELEHEALTH EVALUATION -- Audio/Visual (During AQMSJ-11 public health emergency)    Due to COVID 19 outbreak, patient's office visit was converted to a virtual visit. Patient was contacted and agreed to proceed with a virtual visit via Telephone Visit  The risks and benefits of converting to a virtual visit were discussed in light of the current infectious disease epidemic. Patient also understood that insurance coverage and co-pays are up to their individual insurance plans. 2021 HPI:    Flynn Markham (:  1960) has requested an audio/video evaluation for the following concern(s):  Referred by oncology Dr. Adryan Gonzalez for evaluation of having Percutaneous Ct guided needle biopsy of Liver lesion and possibly lytic bone lesion. Recently had Covid in November. Admitted to [facility] with RLE DVT and PE. Discharged on Xarelto. Presented to ED on  with RLE edema. CTA showed progression of PE while he was on Xarelto. Has had recent visits to [facility] ED for abdominal and/or back pain. Ct abdomen showed multiple liver lesions as well as lytic bone lesions suspicious for metastatic disease. Currently on Xarelto for H/O DVT and PE's. He is unsure who is managing this. Possibly cardiology Dr. Tejas Duron. Denies bleeding concerns. Chronic back pain. Niece Leni Flores whom he states assists with most of his medical care. Wears Home O2. Denies chest pain. Denies dyspnea. Family History   Problem Relation Age of Onset   [employer] Cancer Mother     No Known Problems Brother     Other Sister     Cancer Sister     No Known Problems Sister     No Known Problems Brother        No past surgical history on file.     Past Medical History:   Diagnosis Date    Chronic bilateral low back pain without sciatica 2021    Chronic Medications    Medication Sig Taking? Authorizing Provider   rivaroxaban (XARELTO) 20 MG TABS tablet Take 1 tablet by mouth daily PLEASE FOLLOW UP WITH YOUR PHYSICIAN (EITHER DR Ki Hankins OR YOUR PCP)  YOU WILL NEED ADDITIONAL REFILLS  Julio César Kelly MD   rivaroxaban (XARELTO) 20 MG TABS tablet Take 1 tablet by mouth daily  Darius BRODERICK Holiday, DO   furosemide (LASIX) 40 MG tablet Take 1 tablet by mouth daily for 7 days  Meredith Rivera MD   oxyCODONE-acetaminophen (PERCOCET) 5-325 MG per tablet Take 0.5-1 tablets by mouth every 8 hours as needed for Pain for up to 30 days. LYUDMILA Hagen CNP   mirtazapine (REMERON) 15 MG tablet Take 0.5 tablets by mouth nightly  LYUDMILA Hagen CNP       Social History     Tobacco Use    Smoking status: Former Smoker     Packs/day: 1.00     Years: 10.00     Pack years: 10.00     Types: Cigarettes     Start date: 1979     Quit date: 1988     Years since quittin.9    Smokeless tobacco: Never Used   Vaping Use    Vaping Use: Never used   Substance Use Topics    Alcohol use: No    Drug use: No          PHYSICAL EXAMINATION:  [ INSTRUCTIONS:  \"[x]\" Indicates a positive item  \"[]\" Indicates a negative item  -- DELETE ALL ITEMS NOT EXAMINED]  [x] Alert  [x] Oriented to person/place/time    [x] No apparent distress  [] Toxic appearing    Patient speaking in full sentences.    No apparent distress  Mood and behavior appropriate    [] Face flushed appearing [x] Sclera clear  [] Lips are cyanotic      [x] Breathing appears normal  [] Appears tachypneic      [] Rash on visible skin    [] Cranial Nerves II-XII grossly intact    [] Motor grossly intact in visible upper extremities    [] Motor grossly intact in visible lower extremities    [x] Normal Mood  [] Anxious appearing    [] Depressed appearing  [] Confused appearing      [] Poor short term memory  [] Poor long term memory    [] OTHER:        10/30/2021:  Narrative   EXAM:  CT ABDOMEN PELVIS W IV CONTRAST     History: Right groin pain       Technique: Multiple contiguous axial images were obtained of the abdomen and pelvis from the level of the lung bases through the ischial tuberosities with IV contrast. Multiplanar reformats were obtained.       Comparison: CT abdomen pelvis October 2, 2021       Findings:        Lung bases are clear.       Multiple hypodense lesions of the liver again identified and not significantly changed, the largest of which measures approximately 2.2 cm. The spleen, stomach, pancreas, gallbladder, and adrenal glands are within normal limits.        The kidneys enhance uniformly. Bilateral parapelvic cysts again identified. No urinary tract calculi or hydronephrosis. Urinary bladder is well distended. The prostate is enlarged.       Abdominal aorta is nonaneurysmal  and demonstrates atherosclerotic calcification . No retroperitoneal or abdominal/pelvic lymphadenopathy.       No small bowel obstruction. No overt colonic mass or pericolonic inflammation. Appendix is within normal limits. No free fluid, loculated fluid collection, or pneumoperitoneum.       Large fat-containing indirect right inguinal hernia is again identified and not significant changed from prior examination.       Heterogenous sclerosis throughout the right and left iliac bones and sacrum, heterogenous sclerosis within multiple lower thoracic and lumbar vertebrae findings most concerning for metastatic disease. There is compression deformity of L3 with loss of    height of approximately 40% but no retropulsion concerning for pathologic fracture.  Mild compression deformity of T9 without retropulsion also concerning for pathologic compression fracture.           Impression       Numerous bone and liver lesions as detailed most concerning for metastatic disease.        No significant interval change of a large fat-containing right inguinal hernia.           Due to this being a TeleHealth encounter, evaluation of the following organ systems is limited: Vitals/Constitutional/EENT/Resp/CV/GI//MS/Neuro/Skin/Heme-Lymph-Imm. ASSESSMENT AND PLAN:  Chart, medications, lab work reviewed. Abdominal CT reviewed personally by myself. CT shows multiple liver lesions and multiple lytic bone lesions to bilateral iliac bones, sacrum, and thoracic and lumbar vertebrae. Diagnosis Orders   1. Liver lesion     2. Abnormal abdominal CT scan     3. Lytic bone lesion of hip     4. Chronic bilateral low back pain without sciatica            Plan:     No orders of the defined types were placed in this encounter. No orders of the defined types were placed in this encounter. --  Need clearance to Hold Xarelto as directed due to recent DVT and PE's. --  Is unclear from referral and chart review of oncology notes of whether biopsy is for just liver lesion or both liver and bone lesions. Will need clarification from oncology, Dr. Nabeel Jenkins. --  CT guided percutaneous needle biopsy of either liver and/or bone lytic lesions under conscious sedation. Procedure with risks including infection, bleeding, pain at site, possible damage to major surrounding vessels and/or organs, possibility of inconclusive results, and with any procedure there is a risk of unforseen complications and/or reactions that could potentially lead to death. This discussed with patient and patient wishes to proceed. Will schedule once cardiac cardiac cleared to hold Xarelto and clarification from oncology of just liver or both liver and lytic bone lesions. Message left with Kira Forte at oncology for Dr. Nabeel Jenkins for clearification. An  electronic signature was used to authenticate this note.     --LYUDMILA Garcia - CNP on 12/13/2021 at 12:35 PM        Pursuant to the emergency declaration under the [facility], [address] [place] [address] authority and the [employer] Act, this Virtual Visit was conducted, with patient's consent, to reduce the patient's risk of exposure to COVID-19 and provide continuity of care for an established patient. Services were provided through a video synchronous discussion virtually to substitute for in-person clinic visit. This billable evisit was conducted via Telehealth over phone or with video visit with doxy. me from [address] [place] [address], [address], via myself and the patient. It was explained to patient purpose of Telehealth visit to limit face to face contact due to Coronavirus mandates now in place. Patient verbalized agreement to participate in a billable Telehealth phone visit. Time spent with telehealth visit planning, patient education and counseling, and preparation 30 minutes.

## 2021-12-13 NOTE — TELEPHONE ENCOUNTER
Left vm for pt to call the office back. He needs scheduled for a hospital f/u. He also needs a tcm call so please do one when he calls back.

## 2021-12-14 NOTE — PROGRESS NOTES
Physician Progress Note      Natali Horn  CSN #:                  [id number]  :                       1960  ADMIT DATE:       12/10/2021 4:57 PM  [address] [place] DATE:        2021 6:55 PM  RESPONDING  PROVIDER #:        Dandre Garzon MD          QUERY TEXT:    Pt admitted bilateral pulmonary embolism with right heart strain on CT. No RV   strain per  echo. If possible, please document in the progress notes and   discharge summary if you are evaluating and / or treating any of the   following: The medical record reflects the following:  Risk Factors: recent PE post COVID-19  Clinical Indicators: CTA chest shows bilateral moderate pleural effusions,   worsening bilateral PE, the emboli on the left appear slightly worsened from   2021. Findings suggestive of right heart strain with bowing of the   interventricular septum; Echo shows no RV strain, Normal LV, RVSP 36  Pt is tachycardic with -127, RR 21-28, saturation 90-93% on RA, no   increased wob  Treatment: Heparin gtt, consideration for IVC filter, echo, cardiology   consult, imaging/labs    Thank you, Marlo Nogueira RN BSN CDS  [phone]  Options provided:  -- Pulmonary Embolism without Acute Cor Pulmonale  -- Pulmonary Embolism with Acute Cor Pulmonale  -- Other - I will add my own diagnosis  -- Disagree - Not applicable / Not valid  -- Disagree - Clinically unable to determine / Unknown  -- Refer to Clinical Documentation Reviewer    PROVIDER RESPONSE TEXT:    This patient has Pulmonary Embolism without acute cor pulmonale.     Query created by: Sarah Easton on 2021 11:09 AM      Electronically signed by:  Dandre Garzon MD 2021 12:03 PM

## 2021-12-14 NOTE — TELEPHONE ENCOUNTER
Per discussion on phone with Dr. Adryan Gonzalez, he is requesting CT Guided needle biopsy of liver only. Discussed Xarelto and recent DVT and progression of PE. He is okay to hold Xarelto x 48 hours prior to without bridging.

## 2021-12-15 NOTE — TELEPHONE ENCOUNTER
pts daughter WAS GIVEN THIS INFORMATION PRIOR TO LEAVING THE OFFICE / VIA PHONE CONVERSATION - VOICED UNDERSTANDING  >  YOUR PROCEDURE IS SCHEDULED ON: 01/3/21@ 12  >  You will need to arrive at 11 and check in at the Diagnostic Imaging Check In desk.   >  Do not eat or drink after midnight. Sips of water is acceptable ONLY to take medications. >  Make arrangements for transportation, as you should not drive immediately after.   > stop xarelto 2 days before

## 2021-12-16 NOTE — TELEPHONE ENCOUNTER
Anmol Tovar, Pt's home health physical therapist called today with vitals outside the parameters. Pt's resting heart rate was 125, and SpO2 on 2 liters of O2, was at 92%. After a short 30 foot walk, pt's heart rate was 132, and SpO2 was 87%. And it took him 1-1.5 minutes to recover.

## 2021-12-16 NOTE — ED PROVIDER NOTES
[address] [facility] ED  eMERGENCYdEPARTMENT eNCOUnter      Pt Name: Tylor No  MRN: [id number]  [place] 1960  Date of evaluation: 12/16/2021  Darryl Rhoades MD    CHIEF COMPLAINT           HPI  Tylor No is a 59 y.o. male per chart review has a h/o low back pain, HTn, hpl, recent covid pneumonia with resultant DVT/PE presents to the ED with tachycardia. Pt's home health aid called EMS due to tachycardia. Pt recently started on xarelto. Pt seen in the ED recently for tachycardia and CT PE shows worsening PE and possible RV strain. Pt was admitted to possible IVC filter however did not require one. Pt unsure of why he is here. Pt denies fever, n/v, cp, sob, palpitations, ab pain, dysuria, diarrhea. ROS  Review of Systems   Constitutional: Negative for activity change, chills and fever. HENT: Negative for ear pain and sore throat. Eyes: Negative for visual disturbance. Respiratory: Negative for cough and shortness of breath. Cardiovascular: Negative for chest pain, palpitations and leg swelling. Gastrointestinal: Negative for abdominal pain, diarrhea, nausea and vomiting. Genitourinary: Negative for dysuria. Musculoskeletal: Negative for back pain. Skin: Negative for rash. Neurological: Negative for dizziness and weakness. Except as noted above the remainder of the review of systems was reviewed and negative. PAST MEDICAL HISTORY     Past Medical History:   Diagnosis Date    Chronic bilateral low back pain without sciatica 8/25/2021    Chronic bilateral low back pain without sciatica 8/25/2021    Essential hypertension     Pure hypercholesterolemia 1/12/2018         SURGICAL HISTORY     No past surgical history on file.       CURRENTMEDICATIONS       Previous Medications    FUROSEMIDE (LASIX) 40 MG TABLET    Take 1 tablet by mouth daily for 7 days    MIRTAZAPINE (REMERON) 15 MG TABLET    Take 0.5 tablets by mouth nightly    OXYCODONE-ACETAMINOPHEN (PERCOCET) 5-325 MG PER TABLET    Take 0.5-1 tablets by mouth every 8 hours as needed for Pain for up to 30 days. RIVAROXABAN (XARELTO) 20 MG TABS TABLET    Take 1 tablet by mouth daily PLEASE FOLLOW UP WITH YOUR PHYSICIAN (EITHER DR Ki Hankins OR YOUR PCP)  YOU WILL NEED ADDITIONAL REFILLS    RIVAROXABAN (XARELTO) 20 MG TABS TABLET    Take 1 tablet by mouth daily       ALLERGIES     Patient has no known allergies. FAMILY HISTORY       Family History   Problem Relation Age of Onset   [employer] Cancer Mother     No Known Problems Brother     Other Sister     Cancer Sister     No Known Problems Sister     No Known Problems Brother           SOCIAL HISTORY       Social History     Socioeconomic History    Marital status: Single     Spouse name: Not on file    Number of children: Not on file    Years of education: Not on file    Highest education level: Not on file   Occupational History    Not on file   Tobacco Use    Smoking status: Former Smoker     Packs/day: 1.00     Years: 10.00     Pack years: 10.00     Types: Cigarettes     Start date: 1979     Quit date: 1988     Years since quittin.9    Smokeless tobacco: Never Used   Vaping Use    Vaping Use: Never used   Substance and Sexual Activity    Alcohol use: No    Drug use: No    Sexual activity: Not on file   Other Topics Concern    Not on file   Social History Narrative    Not on file     Social Determinants of Health     Financial Resource Strain: Low Risk     Difficulty of Paying Living Expenses: Not hard at all   Food Insecurity: No Food Insecurity    Worried About Running Out of Food in the Last Year: Never true    Ti of Food in the Last Year: Never true   Transportation Needs: No Transportation Needs    Lack of Transportation (Medical): No    Lack of Transportation (Non-Medical):  No   Physical Activity:     Days of Exercise per Week: Not on file    Minutes of Exercise per Session: Not on file   Stress:     Feeling of Stress : Not on file   Social Connections:     Frequency of Communication with Friends and Family: Not on file    Frequency of Social Gatherings with Friends and Family: Not on file    Attends Confucianism Services: Not on file    Active Member of Clubs or Organizations: Not on file    Attends Club or Organization Meetings: Not on file    Marital Status: Not on file   Intimate Partner Violence:     Fear of Current or Ex-Partner: Not on file    Emotionally Abused: Not on file    Physically Abused: Not on file    Sexually Abused: Not on file   Housing Stability:     Unable to Pay for Housing in the Last Year: Not on file    Number of [place] in the Last Year: Not on file    Unstable Housing in the Last Year: Not on file         PHYSICAL EXAM       ED Triage Vitals [12/16/21 1303]   BP Temp Temp Source Pulse Resp SpO2 Height Weight   (!) 122/50 98 °F (36.7 °C) Temporal 125 19 97 % 5' 10\" (1.778 m) 150 lb (68 kg)       Physical Exam  Vitals and nursing note reviewed. Constitutional:       Appearance: He is well-developed. HENT:      Head: Normocephalic. Right Ear: External ear normal.      Left Ear: External ear normal.   Eyes:      Conjunctiva/sclera: Conjunctivae normal.      Pupils: Pupils are equal, round, and reactive to light. Cardiovascular:      Rate and Rhythm: Regular rhythm. Tachycardia present. Heart sounds: Normal heart sounds. Pulmonary:      Effort: Pulmonary effort is normal.      Breath sounds: Normal breath sounds. Abdominal:      General: Bowel sounds are normal. There is no distension. Palpations: Abdomen is soft. Tenderness: There is no abdominal tenderness. Musculoskeletal:         General: Normal range of motion. Cervical back: Normal range of motion and neck supple. Comments: 1+ edema noted in bilateral lower extremities   Skin:     General: Skin is warm and dry.    Neurological:      Mental Status: He is alert and oriented to person, place, and time. Psychiatric:         Mood and Affect: Mood normal.           MDM  60 yo male presents to the ED with tachycardia. Pt is afebrile, hemodynamically stable. Pt is asymptomatic. EKG shows sinus tach with , normal axis, normal intervals, no ST changes. Labs remarkable for WBC 11, alk phos 1909, INR 1.8. Pt's BNP is 214. CT PE shows unchanged PE.  CT shows possible R heart strain. Pt seen in ED 12/10. Pt resented with same symptoms. Tachycardia. Pt was asymptomatic then. Because CT PE 12/10 showed possible R heart strain, pt admitted to medicine. Echo shows no RV strain. Pt seen by cardiology who did not recommend an IVC filter or thrombectomy as pt was on xarelto and there is no RV strain. Pt with exact same presentation today. Pt's tachycardia is due to known PE and likely malignancy due to elevated alk phos. Pt reassessed, doing well and tolerating PO. Pt wanting to go home. Pt given tachycardia warning signs and will f/uw with pcp. Pt understands plan. FINAL IMPRESSION      1.  Tachycardia          DISPOSITION/PLAN   DISPOSITION Decision To Discharge 12/16/2021 05:25:06 PM        DISCHARGE MEDICATIONS:  [unfilled]         Vu Crocker MD(electronically signed)  Attending Emergency Physician            Vu Crocker MD  12/16/21 8218

## 2021-12-16 NOTE — TELEPHONE ENCOUNTER
Please call patient//patiens neic and recommend ED eval related to dyspnea and increased heart rate at rest

## 2021-12-16 NOTE — TELEPHONE ENCOUNTER
Writer contacted ED provider Emily MAJOR  to inform of 30 day readmission risk via text.      Attending:   Dana Saavedra    Call Back: If you need to call back to inform of disposition you can contact me at [phone]

## 2021-12-16 NOTE — CARE COORDINATION
CALL OUT TO Mireya Hathaway, PT'S NIECE. NO ANSWER, LEFT MESSAGE TO LET HER KNOW THAT PT IS BEING DISCHARGED FROM THE EMERGENCY ROOM AND NEEDS A RIDE HOME.

## 2021-12-16 NOTE — ED TRIAGE NOTES
Pt to ED for tachycardia. Pt's home health nurse called palliative care and was told to send to ER. History of lung cancer, wears home oxygen unaware of how many liters. Pt denies chest pain or sob. EKG sinus tach.

## 2021-12-20 NOTE — TELEPHONE ENCOUNTER
1220 240pm    Spoke with nurse bolivar and states pt HR remains 120 on average. Was advised to FU with cardiology. Vitals /73 , Hr 120-125, O2 92 on 3L. Will send I low dose beta blocker x 1 month until can FU with cardioogly. VM left for niece making her aware of new med and need to FU with cardiology. Have spoken with niece and pt regarding hospice care in past [place] declined. Requsting Hospital bed and shower chair. PEr nurse patient having multiple mobility issues and is not predominately bed bound. Having issues navigating current bed and unable to reposition self without aid of others. Also, unable to  shower as primarily chair/bedbound.  Will order as medical necessity noted above in note    Vandana please print and fax to [facility]

## 2021-12-20 NOTE — TELEPHONE ENCOUNTER
Pt's [facility] nurse called and is requesting an order for a hospital bed for the patient. She also stated she felt like the patient would benefit from a beta blocker.

## 2021-12-28 NOTE — TELEPHONE ENCOUNTER
[facility] would like for Russel to order a [place] [id number] aide to come to the home to assist in ADL's.

## 2021-12-29 PROBLEM — J18.9 PNEUMONIA: Status: ACTIVE | Noted: 2021-01-01

## 2021-12-29 PROBLEM — J16.0 CAP (COMMUNITY ACQUIRED PNEUMONIA) DUE TO CHLAMYDIA SPECIES: Status: ACTIVE | Noted: 2021-01-01

## 2021-12-29 NOTE — ED TRIAGE NOTES
Pt a/o x 3 skin pink w/d resp tachypeic and slt labored. Pt sent by Astrid Correa NP for lt foot pain and swelling since last night. Pt currently being checked ofr lung liver and bone ca. Pt has multiple PE's. Pt had covid shot in October then got covid in November with clots. pt on 3 lnc 24 hrs.

## 2021-12-29 NOTE — ED PROVIDER NOTES
[address] [facility] ED  eMERGENCY dEPARTMENT eNCOUnter      Pt Name: Jemma Mckeon  MRN: [id number]  [place] 1960  Date of evaluation: 12/29/2021  Provider: Colleen Dial PA-C        HISTORY OF PRESENT ILLNESS    Jemma Mckeon is a 59 y.o. male per chart review has ah/o pulmonary embolus and DVT on Xarelto, HTN, HLD, home oxygen use s/p covid-19 infection, tachycardia, lung cancer. Currently not undergoing treatment for cancer. Patient presents to ED today with report of left lower pedal edema and L foot pain since this morning. sent by palliative care for evaluation. Also reporting increased dyspnea x1 day. He is on 2-3L O2 at home post-covid since November. On 5L at 89-93% today. Appears dyspneic in conversation. Also reports anxiety. He is on Xarelto following his pulmonary embolus in November. Has known DVT as well. Reports compliance. Patient most recently had CTA PE study on 12/16 with stable pulmonary emboli. CTA PE on 12/10 noted evidence of possible right heart strain however echocardiogram was done at this time and showed no right heart strain. LVEF 60% at this time. Follows with Dr. Yani Coleman. States having liver bx on Jan 3rd then having a meeting afterwards with Dr. Yani Coleman to discuss plan of action. He is on palliative care. He is full code. REVIEW OF SYSTEMS       Review of Systems   Constitutional: Negative for chills and fever. HENT: Negative for congestion. Eyes: Negative for photophobia. Respiratory: Positive for shortness of breath. Negative for cough. Cardiovascular: Negative for chest pain. Gastrointestinal: Negative for abdominal pain, diarrhea, nausea and vomiting. Genitourinary: Negative for difficulty urinating. Musculoskeletal: Positive for joint swelling. Negative for myalgias. Neurological: Negative for headaches. Psychiatric/Behavioral: Negative for confusion. The patient is nervous/anxious.         Except as noted above the remainder of the review of systems was reviewed and negative. PAST MEDICAL HISTORY     Past Medical History:   Diagnosis Date    [facility])     Chronic bilateral low back pain without sciatica 8/25/2021    Chronic bilateral low back pain without sciatica 8/25/2021    Essential hypertension     Pure hypercholesterolemia 1/12/2018         SURGICAL HISTORY     History reviewed. No pertinent surgical history. CURRENT MEDICATIONS       Previous Medications    ESCITALOPRAM (LEXAPRO) 10 MG TABLET    Take 1 tablet by mouth daily    FUROSEMIDE (LASIX) 40 MG TABLET    Take 1 tablet by mouth daily for 7 days    METOPROLOL TARTRATE (LOPRESSOR) 25 MG TABLET    Take 0.5 tablets by mouth 2 times daily    MIRTAZAPINE (REMERON) 15 MG TABLET    Take 0.5 tablets by mouth nightly    OXYCODONE-ACETAMINOPHEN (PERCOCET) 5-325 MG PER TABLET    Take 0.5-1 tablets by mouth every 8 hours as needed for Pain for up to 30 days. RIVAROXABAN (XARELTO) 20 MG TABS TABLET    Take 1 tablet by mouth daily PLEASE FOLLOW UP WITH YOUR PHYSICIAN (EITHER DR Paris Trinidad OR YOUR PCP)  YOU WILL NEED ADDITIONAL REFILLS    RIVAROXABAN (XARELTO) 20 MG TABS TABLET    Take 1 tablet by mouth daily    TRAZODONE (DESYREL) 50 MG TABLET    Take 1 tablet by mouth nightly       ALLERGIES     Patient has no known allergies.     FAMILY HISTORY       Family History   Problem Relation Age of Onset   Lang Cancer Mother     No Known Problems Brother     Other Sister     Cancer Sister     No Known Problems Sister     No Known Problems Brother           SOCIAL HISTORY       Social History     Socioeconomic History    Marital status: Single     Spouse name: None    Number of children: None    Years of education: None    Highest education level: None   Occupational History    None   Tobacco Use    Smoking status: Former Smoker     Packs/day: 1.00     Years: 10.00     Pack years: 10.00     Types: Cigarettes     Start date: 1/31/1979     Quit date: 1/8/1988     Years since quitting: 34.0    Smokeless tobacco: Never Used   Vaping Use    Vaping Use: Never used   Substance and Sexual Activity    Alcohol use: No    Drug use: No    Sexual activity: None   Other Topics Concern    None   Social History Narrative    None     Social Determinants of Health     Financial Resource Strain: Low Risk     Difficulty of Paying Living Expenses: Not hard at all   Food Insecurity: No Food Insecurity    Worried About Running Out of Food in the Last Year: Never true    Ti of Food in the Last Year: Never true   Transportation Needs: No Transportation Needs    Lack of Transportation (Medical): No    Lack of Transportation (Non-Medical): No   Physical Activity:     Days of Exercise per Week: Not on file    Minutes of Exercise per Session: Not on file   Stress:     Feeling of Stress : Not on file   Social Connections:     Frequency of Communication with Friends and Family: Not on file    Frequency of Social Gatherings with Friends and Family: Not on file    Attends [employer] Services: Not on file    Active Member of [address] [place] [address] or Organizations: Not on file    Attends Club or Organization Meetings: Not on file    Marital Status: Not on file   Intimate Partner Violence:     Fear of Current or Ex-Partner: Not on file    Emotionally Abused: Not on file    Physically Abused: Not on file    Sexually Abused: Not on file   Housing Stability:     Unable to Pay for Housing in the Last Year: Not on file    Number of [place] in the Last Year: Not on file    Unstable Housing in the Last Year: Not on file         PHYSICAL EXAM        ED Triage Vitals [12/29/21 1450]   BP Temp Temp Source Pulse Resp SpO2 Height Weight   104/84 98.2 °F (36.8 °C) Oral 148 (!) 32 -- 5' 8\" (1.727 m) 150 lb (68 kg)       Physical Exam  Constitutional:       General: He is not in acute distress. Appearance: Normal appearance. HENT:      Head: Normocephalic and atraumatic.       Right Ear: External ear normal.      Left Ear: External ear normal.      Nose: Nose normal.      Mouth/Throat:      Mouth: Mucous membranes are moist.      Pharynx: Oropharynx is clear. Eyes:      Extraocular Movements: Extraocular movements intact. Cardiovascular:      Rate and Rhythm: Normal rate and regular rhythm. Pulses: Normal pulses. Pulmonary:      Effort: Tachypnea present. No respiratory distress. Breath sounds: Normal breath sounds. Abdominal:      General: Bowel sounds are normal. There is no distension. Palpations: Abdomen is soft. Tenderness: There is no abdominal tenderness. Musculoskeletal:         General: No tenderness. Normal range of motion. Cervical back: Normal range of motion. Right lower leg: Edema (1+ pitting) present. Left lower leg: Edema (2+ pitting, slightly increased compared to right) present. Skin:     General: Skin is warm. Coloration: Skin is not pale. Findings: No erythema, lesion or rash. Neurological:      Mental Status: He is alert and oriented to person, place, and time. Sensory: No sensory deficit.    Psychiatric:         Mood and Affect: Mood normal.         Behavior: Behavior normal.           LABS:  Labs Reviewed   COMPREHENSIVE METABOLIC PANEL - Abnormal; Notable for the following components:       Result Value    CO2 19 (*)     Anion Gap 18 (*)     Glucose 113 (*)     BUN 28 (*)     CREATININE 0.40 (*)     Total Protein 5.7 (*)     Albumin 2.7 (*)     Total Bilirubin 1.6 (*)     Alkaline Phosphatase 3,409 (*)     ALT 77 (*)      (*)     All other components within normal limits   CBC WITH AUTO DIFFERENTIAL - Abnormal; Notable for the following components:    WBC 11.4 (*)     RBC 4.04 (*)     Hemoglobin 11.6 (*)     Hematocrit 36.5 (*)     MCHC 31.8 (*)     RDW 18.5 (*)     Platelets 53 (*)     Neutrophils Absolute 10.7 (*)     Lymphocytes Absolute 0.2 (*)     All other components within normal limits   PROTIME-INR - Abnormal; Notable for the following components:    Protime 31.4 (*)     All other components within normal limits   APTT - Abnormal; Notable for the following components:    aPTT 37.4 (*)     All other components within normal limits   PROCALCITONIN - Abnormal; Notable for the following components:    Procalcitonin 0.35 (*)     All other components within normal limits   LACTIC ACID, PLASMA - Abnormal; Notable for the following components:    Lactic Acid 5.9 (*)     All other components within normal limits    Narrative:     CALL  Perez  [facility] tel. [phone],  LACT results called to and read back by Brittnee Maurer, 12/29/2021 18:37, by  [facility] [place]   MAGNESIUM - Abnormal; Notable for the following components:    Magnesium 2.5 (*)     All other components within normal limits   POCT ARTERIAL - Abnormal; Notable for the following components:    POC Creatinine 0.6 (*)     pH, Arterial 7.467 (*)     pCO2, Arterial 32 (*)     pO2, Arterial 64 (*)     O2 Sat, Arterial 94 (*)     Lactate 6.28 (*)     POC Hematocrit 39 (*)     Hemoglobin 13.4 (*)     All other components within normal limits    Narrative:     CALL  Perez  [facility] tel. [phone],   RAPID INFLUENZA A/B ANTIGENS   COVID-19, RAPID   CULTURE, BLOOD 2   CULTURE, BLOOD 1   BRAIN NATRIURETIC PEPTIDE   TROPONIN   COMPREHENSIVE METABOLIC PANEL   MAGNESIUM   CBC WITH AUTO DIFFERENTIAL   LACTIC ACID, PLASMA   LACTIC ACID, PLASMA   PROCALCITONIN   LACTIC ACID, PLASMA         MDM:   Vitals:    Vitals:    12/29/21 2200 12/29/21 2300 12/30/21 0100 12/30/21 0200   BP: (!) 129/93 107/82 113/81 (!) 126/95   Pulse: 138 130 125 108   Resp: (!) 34 28     Temp:       TempSrc:       SpO2: 90% 91% 92% 92%   Weight:       Height:           EKG sinus tachycardia  no acute ST changes regular intervals no axis deviation. Pt treated with IV NS, cardizem, ativan, mag, solumedrol. [employer], BOBBI assumed care from [place], [place] at 6pm on 12/29/21. Pt persistently tachycardic to 120-140.  Increased O2 requirement, only maintaining saturations to 90% on 5L. Tachypnea to 28-32. ABG shows pH of 7.467 PCO2 32 PO2 64 and O2 sat 94. Lactic acidosis to 5.9 likely secondary to liver failure and poor clearance. Alk phos 3409 ALT 77 . Known liver lesions. CBC remarkable for wbc of 11.4, anemia with hgb of 11. 6. procal 0.35. covid and flu negative. Blood cultures pending. CTA of the chest shows lobar segmental and subsegmental pulmonary emboli left upper and lower lobes. Slight burden decreased from prior exam.  Persistent right ventricular strain suggested. Moderate bilateral pleural effusions unchanged. New bilateral opacities concerning for pneumonia. Stable right upper lobe para mediastinal mass concerning for malignancy. Diffuse osseous metastatic disease, new pathologic compression fracture of T9 with 30% height loss. Stable pathologic compression fx of T5. Started on vancomycin and zosyn recent hospitalization. Arterial U/S LLE negative for acute arterial occulusion. Due to acute on chronic respiratory failure, pneumonia, worsening liver function, lactic acidosis, pt to be admitted to the floor. Dr. Teresa Owens accepts. CRITICAL CARE TIME   Total CriticalCare time was 0minutes, excluding separately reportable procedures. There was a high probability of clinically significant/life threatening deterioration in the patient's condition which required my urgent intervention. PROCEDURES:  Unlessotherwise noted below, none      Procedures      FINAL IMPRESSION      1. Acute on chronic respiratory failure with hypoxia (HCC)    2. Chronic pulmonary embolism with acute cor pulmonale, unspecified pulmonary embolism type ([address]    3. Chronic bilateral pleural effusions    4. Pneumonia due to infectious organism, unspecified laterality, unspecified part of lung    5. Pathologic compression fracture of thoracic vertebra, initial encounter ([address]    6.  Liver failure without hepatic coma, unspecified chronicity ([facility]    7. Lactic acidosis          DISPOSITION/PLAN   DISPOSITION Admitted 12/29/2021 11:39:21 PM          Uma Isaac PA-C (electronically signed)            Uma Isaac PA-C  12/30/21 2821

## 2021-12-29 NOTE — Clinical Note
Patient Class: Inpatient [101]   REQUIRED: Diagnosis: Pneumonia [948082]   Estimated Length of Stay: Estimated stay of more than 2 midnights   Admitting Provider: Cindi ROSARIO 12 [[id number]]   Preferred Department: ICU   Telemetry/Cardiac Monitoring Required?: Yes

## 2021-12-29 NOTE — PROGRESS NOTES
Subjective:      Patient Id: Seen Sekou Loera via VV for symptom management. He was accompanied to the appointment by: his niece. Chief Complaint   Patient presents with    Pain    Shortness of Breath    Other    Follow-up      Patient notified that this is a billable service and has given verbal consent for telehealth services. Video services needed in light of CoVID-19 Pandemic. Source of visit video via [employer]. [facility]       Cecilio Jones is a 59 y.o. male seen for symptom management. Sekou Loera has complex medical history that includes Bilateral acute pulmonary emboli, Fracture T5 vertebral, Liver lesions, and bone lesions suspected of cancer. Patient complains of chronic pain. States pain is exacerbated today. Rates pain 10/10 in left foot. States has increase in LLE swelling and edema with erythjmea around site not well visualized on video exam. Also has been having increasing SOB with conversation. States he called squad shortly before visit as was fearful was blood clot. Advised already on xeralto as CT chest on 11/16/21 showed Bilateral acute pulmonary emboli within segmental and subsegmental branches as discussed which is treatment for PE though as patient was very dyspneic with conversation and this is new would advise ER eval.      States pain occurs in LLE, bilateral hips, abd, and lower back. Describes pain as a constant ache. Currently taking Percocet 0.5 tabs 1-2 times daily. Does not want to maximize further even with pain increases related to fatigue. Denies Sedation, constipation, or other adverse effects on current regime. Patient awaiting liver biopsy for suspected liver cancer scheduled on 1/3/2021. Scans have shown numerous bone lesions and liver lesion concerning for metastatic disease. Per patient appetite improved. Tolerating diet without nausea at this time. He is primarily chair/bedbound related to increasing weakness.        States he remains having intermittent insomnia with breakthrough anxiety at times. Sleeping on average 2-4 hours a night. Denies overwhelming depression: denies suicidal or homicidal ideation or signs suggesting existential grief or spiritual pain. Past Medical History:   Diagnosis Date    Chronic bilateral low back pain without sciatica 2021    Chronic bilateral low back pain without sciatica 2021    Essential hypertension     Pure hypercholesterolemia 2018     No past surgical history on file. Social History     Socioeconomic History    Marital status: Single     Spouse name: Not on file    Number of children: Not on file    Years of education: Not on file    Highest education level: Not on file   Occupational History    Not on file   Tobacco Use    Smoking status: Former Smoker     Packs/day: 1.00     Years: 10.00     Pack years: 10.00     Types: Cigarettes     Start date: 1979     Quit date: 1988     Years since quittin.9    Smokeless tobacco: Never Used   Vaping Use    Vaping Use: Never used   Substance and Sexual Activity    Alcohol use: No    Drug use: No    Sexual activity: Not on file   Other Topics Concern    Not on file   Social History Narrative    Not on file     Social Determinants of Health     Financial Resource Strain: Low Risk     Difficulty of Paying Living Expenses: Not hard at all   Food Insecurity: No Food Insecurity    Worried About [facility] in the Last Year: Never true    [facility] in the Last Year: Never true   Transportation Needs: No Transportation Needs    Lack of Transportation (Medical): No    Lack of Transportation (Non-Medical):  No   Physical Activity:     Days of Exercise per Week: Not on file    Minutes of Exercise per Session: Not on file   Stress:     Feeling of Stress : Not on file   Social Connections:     Frequency of Communication with Friends and Family: Not on file    Frequency of Social Gatherings with Friends and Family: Not on file   [facility] [employer] for arthralgias, back pain and gait problem. Skin: Negative. Neurological: Positive for weakness. Negative for dizziness and syncope. Psychiatric/Behavioral: Negative for confusion, sleep disturbance and suicidal ideas. The patient is nervous/anxious. Objective: There were no vitals taken for this visit. Wt Readings from Last 3 Encounters:   12/16/21 150 lb (68 kg)   12/12/21 153 lb 14.1 oz (69.8 kg)   11/16/21 171 lb (77.6 kg)     Physical Exam  Constitutional:       Appearance: He is ill-appearing. HENT:      Head: Normocephalic and atraumatic. Mouth/Throat:      Mouth: Mucous membranes are moist.   Eyes:      Pupils: Pupils are equal, round, and reactive to light. Pulmonary:      Breath sounds: Wheezing present. Comments: Dyspnic with conversation with intermittent wheezing heard on virtual visit eval  Abdominal:      Tenderness: There is no guarding. Musculoskeletal:      Left lower leg: Edema present. Skin:     General: Skin is dry. Neurological:      Mental Status: He is alert and oriented to person, place, and time. Psychiatric:         Mood and Affect: Mood normal.         Thought Content: Thought content normal.         Assessment and Plan:      1. Other chronic pain  2. Cancer related pain  - Advised to maximize up to 0.5 tabs every 8 hrs if needed. Pt fearfull of addiction though explained in detail he is minimal risk for such as long as does not take more than prescribed. - oxyCODONE-acetaminophen (PERCOCET) 5-325 MG per tablet; Take 0.5-1 tablets by mouth every 8 hours as needed for Pain for up to 30 days. Dispense: 90 tablet; Refill: 0    Pt is tolerating current pain meds without adverse effects or over sedation. Lowest effective dose used. Pt advised to call and notify palliative care for any adverse effects or sedation  Pt is able to maintain adequate functional level and participate in ADLs  OARRS reviewed.  There is no indication of aberrant behavior  Pt advised to call for increasing or uncontrolled pain. Risk vs benefit assessed. Pt educated on risk of addiction. Pt advised to take only as prescribed and not to change frequency of pain meds without consulting palliative care first.    3. Lesion of liver  4. Bone lesion  5. Suspected malignant neoplasm  - FU with heme/onc as scheduled  - Liver biopsy scheduled 1/3/21    6. Insomnia, unspecified type    - traZODone (DESYREL) 50 MG tablet; Take 1 tablet by mouth nightly  Dispense: 30 tablet; Refill: 1    7. Anorexia  8. Unintentional weight loss  - Cont remeron as prescribed    Small, frequent meals. Maximize calories and protein  Daily nutritional shakes    9. Weakness  10. Debility  - Decline in function noted    9. Anxiety  - Will start on SSRI    - escitalopram (LEXAPRO) 10 MG tablet; Take 1 tablet by mouth daily  Dispense: 30 tablet; Refill: 3    10. Weakness  11. Debility  12. Encounter for palliative care  - Patient has been made many times and is aware he is hospice appropriate though per wishes does not want to opt for hospice care. Called squad prior to VV related to fear on BC in LLE with edema from mid calf to lower foot not well visualized on VV eval. He is already on xeralto for prior PE. Of concern is that he is more dyspnic with conversation and had audable wheezing on VV eval. Agree with Er eval related to above. Will schedule patient for at home visit on 1/3/2021 if not admitted. Medications Discontinued During This Encounter   Medication Reason    oxyCODONE-acetaminophen (PERCOCET) 5-325 MG per tablet REORDER     Due to acuity, symptomatology and high-risk medication management, I advised patient to Return in about 1 week (around 1/5/2022), or if symptoms worsen or fail to improve.        Total Time 50 mins   > 50% Time Spent Counseling/Care coordination yes       Shirley Katz, APRN - CNP    Collaborating physician: Dr. Rj Fitzgerald

## 2021-12-30 NOTE — CONSULTS
Pulmonary and Critical Care Medicine  Consult Note  Encounter Date: 2021 12:07 PM    Mr. Sukhi Meyer is a 59 y.o. male  : 1960  Requesting Provider: Pino Joy DO    Reason for request: Worsening shortness of breath            HISTORY OF PRESENT ILLNESS:    Patient is 59 y.o. presents with worsening shortness of breath, he could not tell when that the symptoms started, no coughing, no chest pain, no fever or chills, no sick contact, no nausea no vomiting, he denies worsening lower extremity edema, he came to emergency room urgently for pain in his left foot, no history of trauma. Patient has history of lung cancer however no biopsy at this point yet due to the recent PE and DVT, currently on no treatment, he has history of PE and DVT with history of Covid pneumonia. Patient is scheduled to see Dr. Maria Del Carmen Werner on January 3 for CT-guided biopsy. Patient is on chronic oxygen at 3 L/min. Past Medical History:        Diagnosis Date    Cancer [facility])     Chronic bilateral low back pain without sciatica 2021    Chronic bilateral low back pain without sciatica 2021    Essential hypertension     Pure hypercholesterolemia 2018       Past Surgical History:    History reviewed. No pertinent surgical history. Social History:     reports that he quit smoking about 34 years ago. His smoking use included cigarettes. He started smoking about 42 years ago. He has a 10.00 pack-year smoking history. He has never used smokeless tobacco. He reports that he does not drink alcohol and does not use drugs. Family History:       Problem Relation Age of Onset    Cancer Mother     No Known Problems Brother     Other Sister     Cancer Sister     No Known Problems Sister     No Known Problems Brother        Allergies:  Patient has no known allergies.         MEDICATIONS during current hospitalization:    Continuous Infusions:   sodium chloride 75 mL/hr at 21 0228    sodium chloride Scheduled Meds:   rivaroxaban  20 mg Oral Daily    traZODone  50 mg Oral Nightly    metoprolol tartrate  12.5 mg Oral BID    escitalopram  10 mg Oral Daily    vancomycin (VANCOCIN) intermittent dosing (placeholder)   Other RX Placeholder    vancomycin  1,500 mg IntraVENous Q12H    sodium chloride flush  5-40 mL IntraVENous 2 times per day    piperacillin-tazobactam  3,375 mg IntraVENous Q8H       PRN Meds:oxyCODONE-acetaminophen, sodium chloride flush, sodium chloride, ondansetron **OR** ondansetron, polyethylene glycol, acetaminophen **OR** acetaminophen, ipratropium-albuterol        REVIEW OF SYSTEMS:  ROS: 10 organs review of system is done including general, psychological, ENT, hematological, endocrine, respiratory, cardiovascular, gastrointestinal, musculoskeletal, neurological,  allergy and Immunology is done and is otherwise negative. PHYSICAL EXAM:    Vitals:  BP (!) 133/102   Pulse 125   Temp 98.2 °F (36.8 °C) (Oral)   Resp 22   Ht 5' 8\" (1.727 m)   Wt 150 lb (68 kg)   SpO2 97%   BMI 22.81 kg/m²     General: alert, cooperative, mild    to moderate respiratory distress  Head: normocephalic, atraumatic  Eyes:No gross abnormalities. ENT:  MMM no lesions  Neck:  supple and no masses  Chest : Diminished air movement, bilateral rales, no wheezing, nontender, tympanic  Heart[de-identified] Heart sounds are normal.  Regular rate and rhythm without murmur, gallop or rub. ABD:  symmetric, soft, non-tender, no guarding or rebound  Musculoskeletal : no clubbing and cyanotic left toes, mild, nontender, no ulcers, no pitting edema. Neuro:  Grossly normal  Skin: No rashes or nodules noted.   Lymph node:  no cervical nodes  Urology: No Velázquez   Psychiatric: appropriate        Data Review  Recent Labs     12/29/21  1600 12/29/21  1719 12/30/21  0520   WBC  --  11.4* 10.4   HGB 13.4* 11.6* 10.6*   HCT  --  36.5* 33.3*   PLT  --  53* 35*      Recent Labs     12/29/21  1600 12/29/21  1719 12/30/21  0520   NA  -- 139 131*   K  --  4.7 4.5   CL  --  102 97   CO2  --  19* 21   BUN  --  28* 29*   CREATININE 0.6* 0.40* 0.32*   GLUCOSE  --  113* 131*       MV Settings: ABGs:   Recent Labs     12/29/21  1600   PHART 7.467*   [id number] 32*   PO2ART 64*   QIN8MZP 22.8   BEART -1   [id number] 94*   JMZ5GYR 24     O2 Device: Heated high flow cannula  O2 Flow Rate (L/min): 60 L/min  Lab Results   Component Value Date    LACTA 6.0 12/30/2021    LACTA 6.6 12/30/2021    LACTA 5.9 12/29/2021       Radiology  I personally reviewed imaging studies and CT chest reviewed by me, bilateral pleural effusion, 2 nodules in the right upper lobe,        Assessment, plan:   Patient is at risk due to    · Acute on chronic hypoxic respiratory failure  · Likely due to volume overload with worsening pleural effusion  · Probable COPD, no signs of acute exacerbation  · Right upper lobe lung nodule, patient is scheduled for CT-guided biopsy next week  · Left foot pain with cyanosis, concerning for ischemia  · Recent COVID-19 infection,  · Provoked PE and DVT on chronic anticoagulation  · Increased alk phosphatase and bilirubin, could be due to liver congestion, in light of increased alk phosphatase bone metastasis is possible    Recommendation  · O2 to keep sat 90 to 92%  · Change to high flow nasal cannula, can use BiPAP as needed  · Lasix 20 mg IV x1, repeat if needed  · Budesonide nebs twice daily  · DuoNeb every 6 hours  · Procalcitonin slightly increased, patient on Zosyn  · Watch renal function urine output  · Monitor left lower extremity, obtain Doppler, if compromised blood flow consider vascular surgery consult, discussed with Dr. Hanna Reid   · Right upper lobe lung nodule, scheduled for biopsy next week, if not accessible with CT-guided biopsy will be happy to see patient for navigational bronchoscopy.   · Liver ultrasound, if negative, consider bone scan rule out metastatic lesion in light of high alk phosphatase              Thank you for consultation    Electronically signed by Aleksandra Mercado MD, [facility],  on 12/30/2021 at 12:07 PM

## 2021-12-30 NOTE — H&P
[place] [address] MEDICINE    HISTORY AND PHYSICAL EXAM    PATIENT NAME:  Judith Belcher    MRN:  [id number]  SERVICE DATE:  12/29/2021   SERVICE TIME:  11:41 PM    Primary Care Physician: Asuncion Macario MD       SUBJECTIVE  CHIEF COMPLAINT: Left foot pain and swelling. HPI: Patient sent in by the advice of palliative care after complaining during her visit of LLE and patient having shortness of breath. Patient being admitted for pneumonia. Patient is pleasant, alert and oriented x3,  male, 78-year-old. Patient has a history of liver lesions and bone lesions that are being evaluated for cancer. Patient was recently diagnosed with a DVT right femoral vein and has history of PE. Patient is on anticoagulation. Patient has also been short of breath ongoing for about a week. Patient has history of Covid pneumonia which was diagnosed on 11/16/2021. Patient reports increased dyspnea x1 day. Patient was found to be hypoxic even on 5 L O2. Patient denies any cough, fever, chest pain, abdominal pain, nausea, or vomiting. PAST MEDICAL HISTORY:    Past Medical History:   Diagnosis Date    Cancer [facility])     Chronic bilateral low back pain without sciatica 8/25/2021    Chronic bilateral low back pain without sciatica 8/25/2021    Essential hypertension     Pure hypercholesterolemia 1/12/2018     PAST SURGICAL HISTORY:  History reviewed. No pertinent surgical history.   FAMILY HISTORY:    Family History   Problem Relation Age of Onset   Collin Sprague Cancer Mother     No Known Problems Brother     Other Sister     Cancer Sister     No Known Problems Sister     No Known Problems Brother      SOCIAL HISTORY:    Social History     Socioeconomic History    Marital status: Single     Spouse name: Not on file    Number of children: Not on file    Years of education: Not on file    Highest education level: Not on file   Occupational History    Not on file   Tobacco Use    Smoking status: Former Smoker     Packs/day: 1.00     Years: 10.00     Pack years: 10.00     Types: Cigarettes     Start date: 1979     Quit date: 1988     Years since quittin.9    Smokeless tobacco: Never Used   Vaping Use    Vaping Use: Never used   Substance and Sexual Activity    Alcohol use: No    Drug use: No    Sexual activity: Not on file   Other Topics Concern    Not on file   Social History Narrative    Not on file     Social Determinants of Health     Financial Resource Strain: Low Risk     Difficulty of Paying Living Expenses: Not hard at all   Food Insecurity: No Food Insecurity    Worried About Running Out of Food in the Last Year: Never true    Ti of Food in the Last Year: Never true   Transportation Needs: No Transportation Needs    Lack of Transportation (Medical): No    Lack of Transportation (Non-Medical): No   Physical Activity:     Days of Exercise per Week: Not on file    Minutes of Exercise per Session: Not on file   Stress:     Feeling of Stress : Not on file   Social Connections:     Frequency of Communication with Friends and Family: Not on file    Frequency of Social Gatherings with Friends and Family: Not on file    Attends Latter day Services: Not on file    Active Member of [address] [place] [address] or Organizations: Not on file    Attends Club or Organization Meetings: Not on file    Marital Status: Not on file   Intimate Partner Violence:     Fear of Current or Ex-Partner: Not on file    Emotionally Abused: Not on file    Physically Abused: Not on file    Sexually Abused: Not on file   Housing Stability:     Unable to Pay for Housing in the Last Year: Not on file    Number of [place] in the Last Year: Not on file    Unstable Housing in the Last Year: Not on file     MEDICATIONS:   Prior to Admission medications    Medication Sig Start Date End Date Taking?  Authorizing Provider   escitalopram (LEXAPRO) 10 MG tablet Take 1 tablet by mouth daily 21   LYUDMILA Parry - CNP oxyCODONE-acetaminophen (PERCOCET) 5-325 MG per tablet Take 0.5-1 tablets by mouth every 8 hours as needed for Pain for up to 30 days. 12/29/21 1/28/22  LYUDMILA Ceballos CNP   traZODone (DESYREL) 50 MG tablet Take 1 tablet by mouth nightly 12/29/21   LYUDMILA Ceballos CNP   metoprolol tartrate (LOPRESSOR) 25 MG tablet Take 0.5 tablets by mouth 2 times daily 12/20/21   LYUDMILA Ceballos CNP   rivaroxaban (XARELTO) 20 MG TABS tablet Take 1 tablet by mouth daily PLEASE FOLLOW UP WITH YOUR PHYSICIAN (EITHER [facility] [place] PCP)  YOU WILL NEED ADDITIONAL REFILLS 12/12/21   Uriel Unger MD   rivaroxaban (XARELTO) 20 MG TABS tablet Take 1 tablet by mouth daily 12/12/21   Darius J Holiday, DO   furosemide (LASIX) 40 MG tablet Take 1 tablet by mouth daily for 7 days 12/10/21 12/17/21  Eva Ng MD   mirtazapine (REMERON) 15 MG tablet Take 0.5 tablets by mouth nightly 12/1/21   LYUDMILA Ceballos CNP       ALLERGIES: Patient has no known allergies. REVIEW OF SYSTEM:   Review of Systems   Constitutional: Negative for activity change, chills, fatigue and fever. HENT: Negative for congestion, ear pain, rhinorrhea and sore throat. Eyes: Negative for photophobia and visual disturbance. Respiratory: Positive for shortness of breath. Negative for cough and wheezing. Cardiovascular: Positive for leg swelling. Negative for chest pain. Gastrointestinal: Negative for abdominal pain, diarrhea, nausea and vomiting. Endocrine: Negative for polydipsia, polyphagia and polyuria. Genitourinary: Negative for dysuria, flank pain, hematuria and urgency. Musculoskeletal: Negative for back pain, myalgias and neck stiffness. Skin: Negative for rash and wound. Allergic/Immunologic: Negative for immunocompromised state. Neurological: Negative for dizziness and headaches. Psychiatric/Behavioral: Negative for behavioral problems and confusion.          OBJECTIVE  PHYSICAL EXAM: /82 Pulse 130   Temp 98.2 °F (36.8 °C) (Oral)   Resp 28   Ht 5' 8\" (1.727 m)   Wt 150 lb (68 kg)   SpO2 91%   BMI 22.81 kg/m²     Physical Exam  Vitals and nursing note reviewed. Constitutional:       Appearance: He is well-developed. HENT:      Head: Normocephalic and atraumatic. Nose: Nose normal.   Eyes:      Pupils: Pupils are equal, round, and reactive to light. Cardiovascular:      Rate and Rhythm: Normal rate and regular rhythm. Heart sounds: Normal heart sounds. Pulmonary:      Effort: Pulmonary effort is normal. No respiratory distress. Breath sounds: Normal breath sounds. Decreased air movement present. No wheezing. Abdominal:      General: Bowel sounds are normal.      Palpations: Abdomen is soft. There is no mass. Tenderness: There is no abdominal tenderness. Musculoskeletal:         General: Normal range of motion. Cervical back: Normal range of motion. Right lower le+ Pitting Edema present. Left lower le+ Pitting Edema present. Lymphadenopathy:      Cervical: No cervical adenopathy. Skin:     General: Skin is warm and dry. Capillary Refill: Capillary refill takes less than 2 seconds. Neurological:      Mental Status: He is alert and oriented to person, place, and time. Deep Tendon Reflexes: Reflexes normal.   Psychiatric:         Thought Content: Thought content normal.           DATA:     Diagnostic tests reviewed for today's visit:    Most recent labs and imaging results reviewed.      LABS:    Recent Results (from the past 24 hour(s))   POCT Arterial    Collection Time: 21  4:00 PM   Result Value Ref Range    POC Sodium 141 136 - 145 mEq/L    POC Potassium 4.5 3.5 - 5.1 mEq/L    POC Chloride 104 99 - 110 mEq/L    POC Glucose 107 60 - 115 mg/dl    POC Creatinine 0.6 (L) 0.8 - 1.3 mg/dL    GFR Non-African American >60 >60    GFR African American >60 >60    Calcium, Ion 1.21 1.12 - 1.32 mmol/L    pH, Arterial 7.467 (H) 7.350 - 7.450    pCO2, Arterial 32 (L) 35 - 45 mm Hg    pO2, Arterial 64 (HH) 75 - 108 mm Hg    HCO3, Arterial 22.8 21.0 - 29.0 mmol/L    Base Excess, Arterial -1 -3 - 3    O2 Sat, Arterial 94 (HH) 93 - 100 %    TCO2, Arterial 24 22 - 29    Lactate 6.28 (HH) 0.40 - 2.00 mmol/L    POC Hematocrit 39 (L) 41 - 53 %    Hemoglobin 13.4 (L) 13.5 - 17.5 gm/dL    FIO2 5.000     Sample Type ART     Performed on SEE BELOW    EKG 12 Lead - Chest Pain    Collection Time: 12/29/21  4:11 PM   Result Value Ref Range    Ventricular Rate 137 BPM    Atrial Rate 137 BPM    P-R Interval 130 ms    QRS Duration 84 ms    Q-T Interval 280 ms    QTc Calculation (Bazett) 422 ms    P Axis 48 degrees    R Axis 6 degrees    T Axis 21 degrees   Lactic Acid, Plasma    Collection Time: 12/29/21  5:18 PM   Result Value Ref Range    Lactic Acid 5.9 (HH) 0.5 - 2.2 mmol/L   Comprehensive Metabolic Panel    Collection Time: 12/29/21  5:19 PM   Result Value Ref Range    Sodium 139 135 - 144 mEq/L    Potassium 4.7 3.4 - 4.9 mEq/L    Chloride 102 95 - 107 mEq/L    CO2 19 (L) 20 - 31 mEq/L    Anion Gap 18 (H) 9 - 15 mEq/L    Glucose 113 (H) 70 - 99 mg/dL    BUN 28 (H) 8 - 23 mg/dL    CREATININE 0.40 (L) 0.70 - 1.20 mg/dL    GFR Non-African American >60.0 >60    GFR  >60.0 >60    Calcium 9.2 8.5 - 9.9 mg/dL    Total Protein 5.7 (L) 6.3 - 8.0 g/dL    Albumin 2.7 (L) 3.5 - 4.6 g/dL    Total Bilirubin 1.6 (H) 0.2 - 0.7 mg/dL    Alkaline Phosphatase 3,409 (H) 35 - 104 U/L    ALT 77 (H) 0 - 41 U/L     (H) 0 - 40 U/L    Globulin 3.0 2.3 - 3.5 g/dL   CBC Auto Differential    Collection Time: 12/29/21  5:19 PM   Result Value Ref Range    WBC 11.4 (H) 4.8 - 10.8 K/uL    RBC 4.04 (L) 4.70 - 6.10 M/uL    Hemoglobin 11.6 (L) 14.0 - 18.0 g/dL    Hematocrit 36.5 (L) 42.0 - 52.0 %    MCV 90.5 80.0 - 100.0 fL    MCH 28.7 27.0 - 31.3 pg    MCHC 31.8 (L) 33.0 - 37.0 %    RDW 18.5 (H) 11.5 - 14.5 %    Platelets 53 (L) 603 - 400 K/uL    PLATELET SLIDE REVIEW Decreased     Neutrophils % 92.0 %    Lymphocytes % 1.0 %    Monocytes % 3.9 %    Eosinophils % 0.2 %    Basophils % 0.4 %    Neutrophils Absolute 10.7 (H) 1.4 - 6.5 K/uL    Lymphocytes Absolute 0.2 (L) 1.0 - 4.8 K/uL    Monocytes Absolute 0.5 0.2 - 0.8 K/uL    Eosinophils Absolute 0.0 0.0 - 0.7 K/uL    Basophils Absolute 0.0 0.0 - 0.2 K/uL    Bands Relative 2 %    Atypical Lymphocytes Relative 1 %    Smudge Cells 5.9     Anisocytosis 1+     Polychromasia Occasional    Brain Natriuretic Peptide    Collection Time: 12/29/21  5:19 PM   Result Value Ref Range    Pro- pg/mL   Protime-INR    Collection Time: 12/29/21  5:19 PM   Result Value Ref Range    Protime 31.4 (H) 12.3 - 14.9 sec    INR 3.1    APTT    Collection Time: 12/29/21  5:19 PM   Result Value Ref Range    aPTT 37.4 (H) 24.4 - 36.8 sec   PROCALCITONIN    Collection Time: 12/29/21  5:19 PM   Result Value Ref Range    Procalcitonin 0.35 (H) 0.00 - 0.15 ng/mL   Troponin    Collection Time: 12/29/21  5:19 PM   Result Value Ref Range    Troponin <0.010 0.000 - 0.010 ng/mL   Magnesium    Collection Time: 12/29/21  5:19 PM   Result Value Ref Range    Magnesium 2.5 (H) 1.7 - 2.4 mg/dL   Rapid Influenza A/B Antigens    Collection Time: 12/29/21  6:13 PM    Specimen: Nasopharyngeal   Result Value Ref Range    Influenza A by PCR Negative     Influenza B by PCR Negative    COVID-19, Rapid    Collection Time: 12/29/21 10:03 PM    Specimen: Nasopharyngeal Swab   Result Value Ref Range    SARS-CoV-2, NAAT Not Detected Not Detected       IMAGING:  XR CHEST PORTABLE    Result Date: 12/29/2021  EXAMINATION: XR CHEST PORTABLE CLINICAL HISTORY: SHORTNESS OF BREATH, HYPOXIA. COMPARISONS: CT CHEST, DECEMBER 16, 2021. DECEMBER 10, 2021. FINDINGS: Permeative change identified right scapula, and distal left clavicle. Portion of cardiopericardial silhouette is obscured by homogeneous area of increased opacity also obscuring right diaphragm and right lower lung. Increased opacity obscuring left lung base and left diaphragm also identified. Ill-defined areas of increased opacity found bilateral midlung zones. Cardiopericardial silhouette normal.     PERMEATIVE CHANGES, RIGHT SCAPULA AND LEFT CLAVICLE. OSSEOUS METASTATIC MALIGNANCY DIAGNOSIS OF EXCLUSION. BILATERAL PLEURAL EFFUSIONS WITH BILATERAL LOWER LUNG ATELECTASIS/PNEUMONIA.      VTE Prophylaxis: On oral anticoagulation    ASSESSMENT AND PLAN    Principal Problem:  Pneumonia: CTA shows new or worsening bilateral opacities. Possible mass seen. Patient started on Vanco and Zosyn in ED due to recent hospital admission. Blood culture sent x2. Elevated lactic acid of 5.9. We'll continue IV antibiotics. [facility] consult pulmonology. We'll monitor CBC daily. We will follow blood cultures. We will continue gentle IV fluids. We will repeat lactic acid. Active Problems:  Liver lesion: Patient currently under evaluation with oncology for malignancy of liver lesions, bone lesions. Lung mass seen on CTA. We will consult palliative care for CODE STATUS and goals of care management. Essential hypertension: Not on meds to control. We'll resume home meds. PE history of DVT: Patient on anticoagulation. We'll resume home meds. Depression with anxiety: Patient on home meds to control. We'll resume home meds.       Plan of care discussed with: patient    SIGNATURE: LYUDMILA Naqvi - TIMOTHY  DATE: December 29, 2021  TIME: 11:41 PM     Dr. Camille Cruz MD - supervising physician

## 2021-12-30 NOTE — PROGRESS NOTES
Pharmacy Note  Vancomycin Consult    Dandre Salvador is a 59 y.o. male started on Vancomycin for pneumonia (HAP); [facility] Also receiving the following antibiotics: Zosyn. No admission diagnoses are documented for this encounter. Allergies: Patient has no known allergies. Temp max: 98.2 F    Cultures  Recent Labs     12/10/21  1719   COVID19 Not Detected     Height: 5' 8\" (172.7 cm), Weight: 150 lb (68 kg), Body mass index is 22.81 kg/m². MRSA Nasal swab: will consider if continued on admission    Recent Labs     12/29/21  1719   CREATININE 0.40*   Estimated Creatinine Clearance: 182 mL/min (A) (based on SCr of 0.4 mg/dL (L)). .    Goal Trough Level: 15 mcg/mL (-600)    Assessment/Plan:  Will initiate Vancomycin with a one time loading dose of 1500 mg x1. Further dosing to be determined if consult continued by admitting provider. Timing of future trough levels may be adjusted based on culture results, renal function, and clinical response. Thank you for the consult. Will continue to follow. VLADIMIR Villagomez. Ph.  12/29/2021  9:41 PM

## 2021-12-30 NOTE — ED NOTES
PALLIATIVE CARE NOTIFIED OF CONSULT VIA VOICEMAIL  S Emma Kelly ON 2341 /12/29/2021     Sonia Nice  12/29/21 [id number]

## 2021-12-30 NOTE — ED NOTES
Pt up to urinate in urinal at bedside. Utilized bed for support. Pt is A&Ox4, c/o pain on left foot around toes. Foot is cold to touch, redness without blanching when pressure applied to area, pulses palpable. Changed warner and gave fresh gown. Pt resting in bed at this time .      Dami Jimenez RN  12/30/21 [id number]

## 2021-12-30 NOTE — PROGRESS NOTES
[facility] OCCUPATIONAL THERAPY EVALUATION - ACUTE     NAME: Parth Diego  : 1960 (59 y.o.)  MRN: [id number]  CODE STATUS: Full Code  Room:     Date of Service: 2021    Patient Diagnosis(es): CAP (community acquired pneumonia) due to Chlamydia species [J16.0]  Pneumonia [J18.9]   Chief Complaint   Patient presents with    Leg Swelling     lt foot sent by Maliha Perea NP     Patient Active Problem List    Diagnosis Date Noted    Pneumonia 2021    Lytic bone lesion of hip 2021    DVT of deep femoral vein, right ([address] 12/10/2021    Pleural effusion 12/10/2021    Liver lesion 12/10/2021    COVID-19 2021    Multiple subsegmental pulmonary emboli without acute cor pulmonale ([address] 2021    Septicemia ([address] 2021    Unintentional weight loss 2021    Chronic bilateral low back pain without sciatica 2021    Essential hypertension 2018    Right inguinal hernia 2018    Pure hypercholesterolemia 2018    Benign prostatic hyperplasia with weak urinary stream 2018    Non-recurrent unilateral inguinal hernia without obstruction or gangrene 2018        Past Medical History:   Diagnosis Date    Cancer [facility])     Chronic bilateral low back pain without sciatica 2021    Chronic bilateral low back pain without sciatica 2021    Essential hypertension     Pure hypercholesterolemia 2018     History reviewed. No pertinent surgical history. Restrictions  Restrictions/Precautions: Fall Risk (high fall risk per clinical judgement)     Safety Devices: Safety Devices  Safety Devices in place: Yes  Type of devices:  All fall risk precautions in place        Subjective  Pre Treatment Pain Screening  Pain at present: 9  Scale Used: Numeric Score  Intervention List: Patient able to continue with treatment  Comments / Details: Pt states he had pain medication half an hour ago    Pain Reassessment:   Pain Assessment  Patient Currently in Pain: Yes  Pain Assessment: 0-10  Pain Level: 7  Pain Type: Acute pain  Pain Location: Leg  Pain Orientation: Left       Prior Level of Function:  Social/Functional History  Lives With: Other (comment) (sister)  Type of Home: House  Home Layout: Laundry in basement,Two level,Bed/Bath upstairs  Home Access: Stairs to enter without rails  Entrance Stairs - Number of Steps: 2 in process of getting railing  Entrance Stairs - Rails: None  Bathroom Shower/Tub:  (Pt states he has been sponge bathing)  Home Equipment:  (rollator)  Receives Help From: Family  ADL Assistance: Needs assistance (assist with hair, back when bathing;)  Homemaking Assistance: Needs assistance  Ambulation Assistance: Independent (2ww)  Transfer Assistance: Independent  Active : No  Occupation: Retired    OBJECTIVE:     Orientation Status:  Orientation  Overall Orientation Status: Within Functional Limits    Observation:  Observation/Palpation  Posture: Fair  Observation: anxious, cooperative with encouragement, on 15L on venti mask(d/t mouth breathing per RN report)SPO2 desat 87% RR 30s, /111 (126),  after mobility    Cognition Status:  Cognition  Overall Cognitive Status: Exceptions  Arousal/Alertness: Appropriate responses to stimuli  Following Commands:  Follows one step commands with increased time  Attention Span: Difficulty attending to directions,Difficulty dividing attention  Memory: Decreased recall of precautions,Decreased recall of recent events,Decreased short term memory,Decreased long term memory  Safety Judgement: Decreased awareness of need for assistance,Decreased awareness of need for safety  Problem Solving: Assistance required to generate solutions,Assistance required to implement solutions,Assistance required to identify errors made,Assistance required to correct errors made  Insights: Fully aware of deficits  Initiation: Requires cues for some  Sequencing: Requires cues for some  Cognition Comment: Anxiety with all mobility requiring encouragement to attempt    Perception Status:  Perception  Overall Perceptual Status: WFL    Sensation Status:  Sensation  Overall Sensation Status: Impaired  Additional Comments: L foot tingling    Vision and Hearing Status:  Vision  Vision: Impaired  Vision Exceptions: Wears glasses for reading  Hearing  Hearing: Within functional limits     ROM:   LUE AROM (degrees)  LUE AROM : WFL  Left Hand AROM (degrees)  Left Hand AROM: WFL  RUE AROM (degrees)  RUE AROM : WFL  Right Hand AROM (degrees)  Right Hand AROM: WFL    Strength:  LUE Strength  Gross LUE Strength: Exceptions to [facility]  L Hand General: 3+/5  LUE Strength Comment: 3+/5 all planes  RUE Strength  Gross RUE Strength: Exceptions to [facility]  R Hand General: 3+/5  RUE Strength Comment: 3+/5 all planes    Coordination, Tone, Quality of Movement: Tone RUE  RUE Tone: Normotonic  Tone LUE  LUE Tone: Normotonic  Coordination  Movements Are Fluid And Coordinated: Yes    Hand Dominance:  Hand Dominance  Hand Dominance: Right    ADL Status:  ADL  Feeding: Independent  Grooming: Supervision  UE Bathing: Stand by assistance  LE Bathing: Moderate assistance  UE Dressing: Stand by assistance  LE Dressing: Moderate assistance  Toileting: Moderate assistance  Additional Comments: Simulated ADLs as above.  Unable to perform figure 4 d/t anxiety with mobility, decreased overall balance and endurance, difficulty weight bearing through LLE  Toilet Transfers  Toilet Transfer: Unable to assess  Toilet Transfers Comments: Anticipate CGA     Therapy key for assistance levels -   Independent = Pt. is able to perform task with no assistance but may require a device   Stand by assistance = Pt. does not perform task at an independent level but does not need physical assistance, requires verbal cues  Minimal, Moderate, Maximal Assistance = Pt. requires physical assistance (25%, 50%, 75% assist from helper) for task but is able to actively participate in task   Dependent = Pt. requires total assistance with task and is not able to actively participate with task completion     Functional Mobility:  Functional Mobility  Functional - Mobility Device: Rolling Walker  Activity: Other  Assist Level: Contact guard assistance  Functional Mobility Comments: CGA to step one step to [facility]  Transfers  Sit to stand: Contact guard assistance  Stand to sit: Contact guard assistance    Bed Mobility  Bed mobility  Supine to Sit: Minimal assistance  Sit to Supine: Minimal assistance  Comment: Max encouragement to attempt    Seated and Standing Balance:  Balance  Sitting Balance: Supervision  Standing Balance: Contact guard assistance    Functional Endurance:  Activity Tolerance  Activity Tolerance: Patient limited by fatigue    D/C Recommendations:  OT D/C RECOMMENDATIONS  REQUIRES OT FOLLOW UP: Yes    Equipment Recommendations:  OT Equipment Recommendations  Other: continue to assess    OT Education:   OT Education  OT Education: OT Role,Plan of Care  Patient Education: Educated pt. on role of acute care OT  Barriers to Learning: None    OT Follow Up:  OT D/C RECOMMENDATIONS  REQUIRES OT FOLLOW UP: Yes     Assessment/Discharge Disposition:  Assessment: Pt is a 59year old man from home who presents to [facility] with the above deficits which impact his ability to perform ADLs and IADLs safely. Pt. would benefit from continued OT to maximize independence and safety with ADL tasks.   Performance deficits / Impairments: Decreased functional mobility ,Decreased ADL status,Decreased strength,Decreased endurance,Decreased balance,Decreased high-level IADLs,Decreased fine motor control,Decreased coordination  Prognosis: Good  Discharge Recommendations: Continue to assess pending progress  Decision Making: Medium Complexity  History: Pt's medical history is moderately complex  Exam: Pt. has 8 performance deficits  Assistance / Modification: Pt. requires mod A    Six Click Score   How much help for putting on and taking off regular lower body clothing?: A Lot  How much help for Bathing?: A Lot  How much help for Toileting?: A Lot  How much help for putting on and taking off regular upper body clothing?: A Little  How much help for taking care of personal grooming?: A Little  How much help for eating meals?: None  AM-PAC Inpatient Daily Activity Raw Score: 16  AM-PAC Inpatient ADL T-Scale Score : 35.96  ADL Inpatient CMS 0-100% Score: 53.32    Plan:  Plan  Times per week: 1-4x  Plan weeks: Length of acute stay  Current Treatment Recommendations: Balance Training,Strengthening,Functional Mobility Training,Endurance Training,Pain Management,Self-Care / ADL,Patient/Caregiver Education & Training,Equipment Evaluation, Education, & procurement,Home Management Training,Safety Education & Training    Goals:   Patient will:    - Improve functional endurance to tolerate/complete 30 mins of ADL's  - Be Mod I in UB ADLs   - Be SBA in LB ADLs  - Be SBA in ADL transfers without LOB  - Be SBA in toileting tasks  - Improve B UE strength and endurance to 4/5 in order to participate in self-care activities as projected. - Access appropriate D/C site with as few architectural barriers as possible. - Sequence self-care tasks with no verbal cues for safety    Patient Goal: Patient goals : \"I want my leg to stop hurting\"        Discussed and agreed upon: Yes Comments:     Therapy Time:   OT Individual Minutes  Time In: 0935  Time Out: 6195  Minutes: 19    Eval: 19 minutes     Electronically signed by:     FERMIN Paige/L, OTR/L  12/30/2021, 12:58 PM

## 2021-12-30 NOTE — PROGRESS NOTES
Physical Therapy Med Surg Initial Assessment  Facility/Department: [address] [place] [address] ED  Room:      NAME: Jerardo Sevilla  : 1960 (59 y.o.)  MRN: [id number]  CODE STATUS: Full Code    Date of Service: 2021    Patient Diagnosis(es): CAP (community acquired pneumonia) due to Chlamydia species [J16.0]  Pneumonia [J18.9]   Chief Complaint   Patient presents with    Leg Swelling     lt foot sent by Eddie Valdez NP     Patient Active Problem List    Diagnosis Date Noted    Pneumonia 2021    Lytic bone lesion of hip 2021    DVT of deep femoral vein, right ([address] 12/10/2021    Pleural effusion 12/10/2021    Liver lesion 12/10/2021    COVID-19 2021    Multiple subsegmental pulmonary emboli without acute cor pulmonale ([address] 2021    Septicemia ([address] 2021    Unintentional weight loss 2021    Chronic bilateral low back pain without sciatica 2021    Essential hypertension 2018    Right inguinal hernia 2018    Pure hypercholesterolemia 2018    Benign prostatic hyperplasia with weak urinary stream 2018    Non-recurrent unilateral inguinal hernia without obstruction or gangrene 2018      Past Medical History:   Diagnosis Date    Cancer [facility])     Chronic bilateral low back pain without sciatica 2021    Chronic bilateral low back pain without sciatica 2021    Essential hypertension     Pure hypercholesterolemia 2018     History reviewed. No pertinent surgical history.     Chart Reviewed: Yes  Patient assessed for rehabilitation services?: Yes  Family / Caregiver Present: No  General Comment  Comments: Pt resting in bed, agreeable to PT eval with max encouragement    Restrictions:  Restrictions/Precautions: Fall Risk (high fall risk per clinical judgement)     SUBJECTIVE: Subjective: Pt reports intermittent L bottom of foot pain that began yesterday per pt report    Pain  Pre Treatment Pain Screening  Pain at present: 9  Scale Used: Numeric Score  Intervention List: Patient able to continue with treatment  Comments / Details: Per RN, pt was recently medicated for pain    Post Treatment Pain Screening:   Pain Screening  Patient Currently in Pain: Yes  Pain Assessment  Pain Assessment: 0-10  Pain Level: 9    Prior Level of Function:  Social/Functional History  Lives With: Other (comment) (sister)  Type of Home: House  Home Layout: Laundry in basement,Two level,Bed/Bath upstairs  Home Access: Stairs to enter without rails  Entrance Stairs - Number of Steps: 2 in process of getting railing  Entrance Stairs - Rails: None  Home Equipment:  (rollator)  Receives Help From: Family  ADL Assistance: Needs assistance (assist with hair, back when bathing;)  Homemaking Assistance: Needs assistance  Ambulation Assistance: Independent (2ww)  Transfer Assistance: Independent  Occupation: Retired    OBJECTIVE:   Vision: Impaired  Vision Exceptions: Wears glasses for reading  Hearing: Within functional limits    Cognition:  Overall Orientation Status: Within Functional Limits  Follows Commands: Within Functional Limits    Observation/Palpation  Observation: anxious, cooperative with encouragement, on 15L on venti mask(d/t mouth breathing per RN report)SPO2 desat 87% RR 30s, /111 (126),  after mobility    ROM:  RLE AROM: WFL  LLE AROM : WFL    Strength:  Strength RLE  Comment: functionally >/=3+/5  Strength LLE  Comment: functionally >/=3+/5    Neuro:  Balance  Posture: Fair  Sitting - Static: Good  Sitting - Dynamic: Good  Standing - Static: Fair;+ (with 2ww)  Standing - Dynamic: Fair     Tone RLE  RLE Tone: Normotonic  Tone LLE  LLE Tone: Normotonic  Motor Control  Gross Motor?: WFL  Sensation  Overall Sensation Status: Impaired  Additional Comments: L foot tingling  Bed mobility  Supine to Sit: Minimal assistance  Sit to Supine: Minimal assistance  Transfers  Sit to Stand: Contact guard assistance  Stand to sit: Contact guard assistance  Comment: with 2ww  Ambulation  Ambulation?: Yes  Ambulation 1  Surface: level tile  Device: Rolling Walker  Assistance: Stand by assistance  Quality of Gait: flexed posture  Gait Deviations: Slow Araceli;Decreased step length  Distance: 1ft  Comments: sidestepping EOB, poor wt acceptance on L forefoot, pt minimally heel WBs only        Activity Tolerance  Activity Tolerance: Patient limited by pain      PT Education  PT Education: PT Role;General Safety;Gait Training    ASSESSMENT:   Body structures, Functions, Activity limitations: Decreased functional mobility ; Decreased strength;Decreased posture; Increased pain  Decision Making: Medium Complexity  History: med  Exam: med  Clinical Presentation: med    Prognosis: Good  Barriers to Learning: none    DISCHARGE RECOMMENDATIONS:  Discharge Recommendations: Continue to assess pending progress  Assessment: Pt demonstrates the above deficits and decline in functional mobility status placing them at increased risk for falls. Pt would benefit from physical therapy to address above deficits and allow for safe return home at highest level of function, decrease risk for falls, and improve QOL.   REQUIRES PT FOLLOW UP: Yes      PLAN OF CARE:  Plan  Times per week: 3-6  Current Treatment Recommendations: Strengthening,Functional Mobility Training,Home Exercise Program,Equipment Evaluation, Education, & procurement,Neuromuscular Re-education,Modalities,Safety Education & Training,Gait Training,Transfer Training,Balance Training,Patient/Caregiver Education & Training,Positioning  Safety Devices  Type of devices: Left in bed,Call light within reach    Goals:  Patient goals : \"to go home\"  Long term goals  Long term goal 1: Bed mobility with indep  Long term goal 2: Functional transfers with indep  Long term goal 3: Amb 50ft with 2ww and indep  Long term goal 4: 5 x STS <13 seconds to demonstate low fall risk    [facility] (6 CLICK) Cindi Noland 28 Inpatient Mobility Raw Score : 15  Therapy Time:   Individual   Time In 0943   Time Out 1000   Minutes 2[facility], [address] [facility], 12/30/21 at 10:17 AM     Definitions for assistance levels  Independent = pt does not require any physical supervision or assistance from another person for activity completion. Device may be needed.   Stand by assistance = pt requires verbal cues or instructions from another person, close to but not touching, to perform the activity  Minimal assistance= pt performs 75% or more of the activity; assistance is required to complete the activity  Moderate assistance= pt performs 50% of the activity; assistance is required to complete the activity  Maximal assistance = pt performs 25% of the activity; assistance is required to complete the activity  Dependent = pt requires total physical assistance to accomplish the task

## 2021-12-30 NOTE — PROGRESS NOTES
Hospitalist Progress Note      PCP: Alysia Jacobo MD    Date of Admission: 12/29/2021    Chief Complaint:      Subjective: :  Patient seen and examined. He reports anxiety, shortness of breath. Just ambulated to urinate, states he was sweating and became acutely short of breath. Currently on nonrebreather and saturating at 88%. Reports left lower extremity pain, usually takes one half Percocet, now requiring full tablet. Denies chest pain, nausea, vomiting.         Medications:  Reviewed    Infusion Medications    sodium chloride 75 mL/hr at 12/30/21 0228    sodium chloride       Scheduled Medications    rivaroxaban  20 mg Oral Daily    traZODone  50 mg Oral Nightly    metoprolol tartrate  12.5 mg Oral BID    escitalopram  10 mg Oral Daily    vancomycin (VANCOCIN) intermittent dosing (placeholder)   Other RX Placeholder    vancomycin  1,500 mg IntraVENous Q12H    furosemide  20 mg IntraVENous Once    sodium chloride flush  5-40 mL IntraVENous 2 times per day    piperacillin-tazobactam  3,375 mg IntraVENous Q8H     PRN Meds: oxyCODONE-acetaminophen, sodium chloride flush, sodium chloride, ondansetron **OR** ondansetron, polyethylene glycol, acetaminophen **OR** acetaminophen, ipratropium-albuterol      Intake/Output Summary (Last 24 hours) at 12/30/2021 1246  Last data filed at 12/30/2021 0500  Gross per 24 hour   Intake 1600 ml   Output --   Net 1600 ml       Exam:    BP (!) 133/102   Pulse 125   Temp 98.2 °F (36.8 °C) (Oral)   Resp 22   Ht 5' 8\" (1.727 m)   Wt 150 lb (68 kg)   SpO2 97%   BMI 22.81 kg/m²     Gen: Appears anxious, alert and oriented x3  Thin, cachectic  HEENT: Normocephalic, atraumatic, pupils are equal reactive, oral membranes are dry  RESP: No wheezing, rhonchi  Cardio: Sinus tach, no murmur  Abdomen: Soft, nontender to palpation, bowel sounds are present  Ext: Trace bilateral lower extremity edema, feet with dry skin, inferior left toes and foot with a bluish discoloration. Skin: Warm and Dry        Labs:   Recent Labs     12/29/21  1600 12/29/21  1719 12/30/21  0520   WBC  --  11.4* 10.4   HGB 13.4* 11.6* 10.6*   HCT  --  36.5* 33.3*   PLT  --  53* 35*     Recent Labs     12/29/21  1600 12/29/21  1719 12/30/21  0520   NA  --  139 131*   K  --  4.7 4.5   CL  --  102 97   CO2  --  19* 21   BUN  --  28* 29*   CREATININE 0.6* 0.40* 0.32*   CALCIUM  --  9.2 8.3*     Recent Labs     12/29/21  1719 12/30/21  0520   * 91*   ALT 77* 63*   BILITOT 1.6* 1.3*   ALKPHOS 3,409* 3,643*     Recent Labs     12/29/21 1719   INR 3.1     Recent Labs     12/29/21 1719   TROPONINI <0.010       Urinalysis:      Lab Results   Component Value Date    NITRU Negative 11/16/2021    BLOODU Negative 11/16/2021    SPECGRAV 1.082 11/16/2021    GLUCOSEU Negative 11/16/2021       Radiology:  US DUP LOWER EXTREMITY LEFT ARTERIES   Final Result      NO EVIDENCE OF A FLOW-LIMITING STENOSIS, ARTERIAL OCCLUSION, OR ANEURYSM IDENTIFIED. CTA Chest W WO  (PE study)   Final Result      1. Slight interval decrease of the left upper and left lower lobar pulmonary emboli with extension into the distal branches. However, the previously visualized right lower segmental pulmonary emboli are not well visualized on this examination due to    obscuring atelectasis and pleural effusion. Persistent right ventricular strain is suggested, clinically correlate with ECHO for further evaluation. 2.Moderate bilateral pleural effusions, increased on the left compared to prior. 3.Scattered bilateral peripheral predominant consolidation and groundglass opacities are concerning for multifocal pneumonia. 4.Stable 2.4 cm right upper lobe pulmonary nodule, is concerning for malignancy. XR CHEST PORTABLE   Final Result   PERMEATIVE CHANGES, RIGHT SCAPULA AND LEFT CLAVICLE. OSSEOUS METASTATIC MALIGNANCY DIAGNOSIS OF EXCLUSION.       BILATERAL PLEURAL EFFUSIONS WITH BILATERAL LOWER LUNG ATELECTASIS/PNEUMONIA. US ABDOMEN LIMITED Specify organ? LIVER, GALLBLADDER    (Results Pending)           Assessment/Plan:    Active Hospital Problems    Diagnosis Date Noted    Pneumonia [J18.9] 12/29/2021    DVT of deep femoral vein, right [facility]) [I82.411] 12/10/2021    Liver lesion [K76.9] 12/10/2021    Multiple subsegmental pulmonary emboli without acute cor pulmonale (HCC) [I26.94] 11/16/2021    Essential hypertension [I10] 04/18/2018          H/o Pulmonary embolism, DVT, anticoagulated on Xarelto  Acute on chronic hypoxic respiratory failure, baseline 3 L  Moderate pleural effusions  Left lower extremity pain, cyanosis  Metastatic disease, Bone mets, Liver lesions, Right upper lung nodule    -Pulm consulted, appreciate recommendations  -Patient hypoxic on nonrebreather, discussed with nursing staff, changed to high flow/BiPAP as needed  -Continue Zosyn,follow-up blood cultures, sputum cultures  -Biopsy of right upper lung nodule scheduled for next week  -IV Lasix 20 mg x 1 given  -Stop IVF  -Strict Is and Os  -Left lower extremity duplex without evidence of stenosis or arterial thrombus  -Right upper quadrant pending, ordered due to elevated alk phos      DVT prophylaxis: On Xarelto        Diet: ADULT DIET;  Regular    Code Status: Full Code    PT/OT Eval           Electronically signed by Jeannette Rios DO on 12/30/2021 at 12:46 PM

## 2021-12-30 NOTE — PROGRESS NOTES
[facility] AT [place] Respiratory Therapy Evaluation   Current Order:  Duoneb Q4 PRN     Home Regimen: None      Ordering Physician: Shelley  Re-evaluation Date:  ---     Diagnosis: Pneumonia      Patient Status: Stable / Unstable + Physician notified    The following MDI Criteria must be met in order to convert aerosol to MDI with spacer. If unable to meet, MDI will be converted to aerosol:  []  Patient able to demonstrate the ability to use MDI effectively  []  Patient alert and cooperative  []  Patient able to take deep breath with 5-10 second hold  []  Medication(s) available in this delivery method   []  Peak flow greater than or equal to 200 ml/min            Current Order Substituted To  (same drug, same frequency)   Aerosol to MDI [] Albuterol Sulfate 0.083% unit dose by aerosol Albuterol Sulfate MDI 2 puffs by inhalation with spacer    [] Levalbuterol 1.25 mg unit dose by aerosol Levalbuterol MDI 2 puffs by inhalation with spacer    [] Levalbuterol 0.63 mg unit dose by aerosol Levalbuterol MDI 2 puffs by inhalation with spacer    [] Ipratropium Bromide 0.02% unit dose by aerosol Ipratropium Bromide MDI 2 puffs by inhalation with spacer    [] Duoneb (Ipratropium + Albuterol) unit dose by aerosol Ipratropium MDI + Albuterol MDI 2 puffs by inhalation w/spacer   MDI to Aerosol [] Albuterol Sulfate MDI Albuterol Sulfate 0.083% unit dose by aerosol    [] Levalbuterol MDI 2 puffs by inhalation Levalbuterol 1.25 mg unit dose by aerosol    [] Ipratropium Bromide MDI by inhalation Ipratropium Bromide 0.02% unit dose by aerosol    [] Combivent (Ipratropium + Albuterol) MDI by inhalation Duoneb (Ipratropium + Albuterol) unit dose by aerosol       Treatment Assessment [Frequency/Schedule]:  Change frequency to: _________no changes_________________________________________per Protocol, P&T, MEC      Points 0 1 2 3 4   Pulmonary Status  Non-Smoker  []   Smoking history   < 20 pack years  [x]   Smoking history  ?  20 pack years  [] Pulmonary Disorder  (acute or chronic)  []   Severe or Chronic w/ Exacerbation  []     Surgical Status No [x]   Surgeries     General []   Surgery Lower []   Abdominal Thoracic or []   Upper Abdominal Thoracic with  PulmonaryDisorder  []     Chest X-ray Clear/Not  Ordered     []  Chronic Changes  Results Pending  []  Infiltrates, atelectasis, pleural effusion, or edema  [x]  Infiltrates in more than one lobe []  Infiltrate + Atelectasis, &/or pleural effusion  []    Respiratory Pattern Regular,  RR = 12-20 [x]  Increased,  RR = 21-25 []  JOHNSON, irregular,  or RR = 26-30 []  Decreased FEV1  or RR = 31-35 []  Severe SOB, use  of accessory muscles, or RR ? 35  []    Mental Status Alert, oriented,  Cooperative [x]  Confused but Follows commands []  Lethargic or unable to follow commands []  Obtunded  []  Comatose  []    Breath Sounds Clear to  auscultation  []  Decreased unilaterally or  in bases only []  Decreased  bilaterally  [x]  Crackles or intermittent wheezes []  Wheezes []    Cough Strong, Spontan., & nonproductive [x]  Strong,  spontaneous, &  productive []  Weak,  Nonproductive []  Weak, productive or  with wheezes []  No spontaneous  cough or may require suctioning []    Level of Activity Ambulatory [x]  Ambulatory w/ Assist  []  Non-ambulatory []  Paraplegic []  Quadriplegic []    Total    Score:____5___     Triage Score:___5_____      Tri       Triage:     1. (>20) Freq: Q3    2. (16-20) Freq: Q4   3. (11-15) Freq: QID & Albuterol Q2 PRN    4. (6-10) Freq: TID & Albuterol Q2 PRN    5. (0-5) Freq Q4prn

## 2021-12-30 NOTE — ED NOTES
DR. Sherryle Puna NOTIFIED OF PULM CONSULT VIA PERFECT SERVE ON 12/30/2021  [facility]  12/30/21 9799

## 2021-12-30 NOTE — PLAN OF CARE
See OT evaluation for all goals and OT POC.  Electronically signed by FERMIN Solano/HOLLY on 12/30/2021 at 1:00 PM

## 2021-12-30 NOTE — PROGRESS NOTES
Pharmacy Note  Vancomycin Consult    Glynn Najjar is a 59 y.o. male started on Vancomycin for pneumonia (HAP); [facility]. Also receiving the following antibiotics: Zosyn. CAP (community acquired pneumonia) due to Chlamydia species [J16.0]  Pneumonia [J18.9]  Allergies: Patient has no known allergies. Temp max: 98.2 F    Cultures  Recent Labs     12/29/21  2203   COVID19 Not Detected     Height: 5' 8\" (172.7 cm), Weight: 150 lb (68 kg), Body mass index is 22.81 kg/m². MRSA Nasal swab:Ordered per pharmacy protocol    Recent Labs     12/29/21  1719   CREATININE 0.40*   Estimated Creatinine Clearance: 182 mL/min (A) (based on SCr of 0.4 mg/dL (L)). .    Goal Trough Level: ~15 mcg/mL; AUC/CODY 400-600    Assessment/Plan:  Will initiate Vancomycin with a one time loading dose of 1500 mg x1 given in ER, followed by 1500 mg IV every 12 hours. Bayesian model predicts steady state , trough 15.1, Pauc 80%, Pconc 31%, tox 10%. Anticipate level to be done 12/31 am.    Timing of future trough levels may be adjusted based on culture results, renal function, and clinical response. Thank you for the consult. Will continue to follow. VLADIMIR Wilson. Ph.  12/30/2021  4:53 AM

## 2021-12-30 NOTE — ED NOTES
Admitting provider notified of lactic acid level of 6.0. No further orders received.      Gina Burris RN  12/30/21 9415

## 2021-12-30 NOTE — ED NOTES
Called US for procedure. Pt has not had anything PO since 0900.        Kathleen Mullins RN  12/30/21 5873

## 2021-12-31 LAB — MRSA CULTURE ONLY: NORMAL

## 2021-12-31 NOTE — ED NOTES
Dr. John Ngo is speaking with [facility] at this time.       Stuart Douglas RN  12/30/21 6393

## 2021-12-31 NOTE — ED NOTES
2057- pt unresponsive   2058- pt tubed   2058- chest compressions started  2058- 1mg epi given   2059- 1mg of atropine given   2100-PEA   2101- 50MEQ BI CARB   2103- 1MG OF EPI   2104- PEA  2106- Dr. Doris Cormier called time of expiration      Camille Nesbitt RN  12/30/21 2107

## 2021-12-31 NOTE — ED NOTES
Called Cherri (supervisor) to assist with body to morgue. Lindsay Falk is unable to come at this time and will call when she is available.       Stuart Douglas RN  12/30/21 5613

## 2021-12-31 NOTE — DISCHARGE SUMMARY
Discharge Summary    Jam Anne  :  1960  MRN:  [id number]    ADMIT DATE:  2021  DISCHARGE DATE:  2021    PRIMARY CARE PHYSICIAN:  Lilian Yeh MD    VISIT STATUS: Admission    CODE STATUS:  Prior    DISCHARGE DIAGNOSES:  Principal Problem:    Pneumonia  Active Problems:    Essential hypertension    Multiple subsegmental pulmonary emboli without acute cor pulmonale (HCC)    DVT of deep femoral vein, right (HCC)    Liver lesion  Resolved Problems:    * No resolved hospital problems. *      HOSPITAL COURSE:  58 yo male with history of liver lesions concerning for metastatic disease(no biopsy or treatment yet d/t recent PE/DVT currently on anticoagulation with Xarelto, h/o COVID infection, chronic hypoxic respiratory failure on 3L presenting at the direction of palliative medicine provider with severe shortness of breath and LLE pain found to have acute hypoxia respiratory failure requiring HF O2 and consolidation on imaging concerning for pneumonia and lactic acidosis. Pt was started on Vancomycin and Zosyn. Pulmonology was consulted. Pt was given IV Lasix 20mg X1 d/t concern for fluid overload. CTA showed slight interval decrease in PE, multifocal PNA and lung nodule concerning for malignancy, and moderate pleural effusions b/l. LLE US was obtained and was negative for arterial flow limiting stenosis. Abdominal US was obtained d/t elevated alk phos and showed hepatomegaly, no color flow abnormality, but did show right pleural effusion. Following evaluation, ER notes show the patient had worsening respiratory failure and was found to be responsive at 857pm on . ACLS initiated by ER staff for PEA arrest. At 2106 ROSC was unable to achieved and Dr. Roxann Jones called TOD. SIGNIFICANT DIAGNOSTIC STUDIES:  US ABDOMEN LIMITED Specify organ? LIVER, GALLBLADDER   Final Result      LIMITED STUDY AS DISCUSSED. HEPATOMEGALY. RIGHT PLEURAL EFFUSION.       US DUP LOWER EXTREMITY LEFT ARTERIES Final Result      NO EVIDENCE OF A FLOW-LIMITING STENOSIS, ARTERIAL OCCLUSION, OR ANEURYSM IDENTIFIED. CTA Chest W WO  (PE study)   Final Result      1. Slight interval decrease of the left upper and left lower lobar pulmonary emboli with extension into the distal branches. However, the previously visualized right lower segmental pulmonary emboli are not well visualized on this examination due to    obscuring atelectasis and pleural effusion. Persistent right ventricular strain is suggested, clinically correlate with ECHO for further evaluation. 2.Moderate bilateral pleural effusions, increased on the left compared to prior. 3.Scattered bilateral peripheral predominant consolidation and groundglass opacities are concerning for multifocal pneumonia. 4.Stable 2.4 cm right upper lobe pulmonary nodule, is concerning for malignancy. XR CHEST PORTABLE   Final Result   PERMEATIVE CHANGES, RIGHT SCAPULA AND LEFT CLAVICLE. OSSEOUS METASTATIC MALIGNANCY DIAGNOSIS OF EXCLUSION. BILATERAL PLEURAL EFFUSIONS WITH BILATERAL LOWER LUNG ATELECTASIS/PNEUMONIA. CONSULTANTS:  Pulmonology  RECOMMENDED NEXT STEPS:        DISCHARGE MEDICATIONS:         Medication List      ASK your doctor about these medications    escitalopram 10 MG tablet  Commonly known as: Lexapro  Take 1 tablet by mouth daily     furosemide 40 MG tablet  Commonly known as: Lasix  Take 1 tablet by mouth daily for 7 days     metoprolol tartrate 25 MG tablet  Commonly known as: LOPRESSOR  Take 0.5 tablets by mouth 2 times daily     mirtazapine 15 MG tablet  Commonly known as: Remeron  Take 0.5 tablets by mouth nightly     oxyCODONE-acetaminophen 5-325 MG per tablet  Commonly known as: Percocet  Take 0.5-1 tablets by mouth every 8 hours as needed for Pain for up to 30 days.      * rivaroxaban 20 MG Tabs tablet  Commonly known as: XARELTO  Take 1 tablet by mouth daily PLEASE FOLLOW UP WITH YOUR PHYSICIAN (EITHER DR Kath Campbell OR YOUR PCP)  YOU WILL NEED ADDITIONAL REFILLS     * rivaroxaban 20 MG Tabs tablet  Commonly known as: XARELTO  Take 1 tablet by mouth daily     traZODone 50 MG tablet  Commonly known as: DESYREL  Take 1 tablet by mouth nightly         * This list has 2 medication(s) that are the same as other medications prescribed for you. Read the directions carefully, and ask your doctor or other care provider to review them with you. DIET: No diet orders on file    PE: Unable to evaluate, pt  during hours off service  ______________________________________________________________________  COMPLEXITY OF FOLLOW UP:   [] Moderate Complexity: follow up within 7-14 calendar days (37043)   [] Severe Complexity: follow up within 7 calendar days (95136)    FOLLOW UP TESTING, PENDING RESULTS OR REFERRALS AT TRANSITIONAL CARE VISIT:   []  Yes    []  No    PENDING STUDIES:       DISPOSITION:    FACILITY/HOME CARE AGENCY NAME:     Follow up with No follow-up provider specified. INSTRUCTIONS TO MA/SW: Please call patient on day after discharge (must document patient  contacted within 2 business days of discharge). FOLLOW UP QUESTIONS FOR MA/SW:  1. Did you get medications filled and taking them as instructed from discharge? 2. Are you following your discharge instructions from your hospital stay? 3. Please confirm patient is scheduled for a follow up appointment within the above time frame.     DISCHARGE TIME: > 30 minutes    SIGNED:  Tony Dial DO   2021, 5:23 PM

## 2021-12-31 NOTE — ED NOTES
Bed assignment of 466 received. Bed is not clean at this time, however, nursing report was given to Zur[place].      Flower Worthy RN  12/30/21 1938

## 2021-12-31 NOTE — ED NOTES
Pt and family would like to see if he is able to get liver biopsy while admitted to the hospital.  Sent perfect serve to provider      Sharon Ramirez RN  12/30/21 3068

## 2021-12-31 NOTE — ED PROVIDER NOTES
Called the patient's room for him being unresponsive. Patient was converted to an ICU bed and they are actually getting ready for transport he became unresponsive. When I walked in the room the patient had insignificant shallow respirations and color was extremely pale. Pupils were noted to be extremely dilated approximately 6 mm and unresponsive. I called a code and intubated the patient. He was given ACLS drugs including epinephrine, atropine, bicarbonate. No return of spontaneous circulation with ACLS protocols in place. She was pronounced dead at 9:06 PM.  Family will be contacted and  and primary care physician. Cardiopulmonary arrest is the diagnosis with unclear cause but he did have history of metastatic cancer, multiple pulmonary emboli and hypoxia.       Jamari Casas MD  12/30/21 0128

## 2021-12-31 NOTE — ED NOTES
Sitting up in bed anxious that NRB is on. Patient current o2 sats 85% on NRB. Patient talking and asked is he could hold this RN's hand as he was feeling anxious. Encouraging patient to take deep breaths in and out through nose and mouth. O2 sats increased to 94% with deep breaths. At 2057, patient then became diaphoretic, pupils were fixed and dilated, and became unresponsive. Dr Can Herman immediately called to bedside.         Yen Carballo RN  12/30/21 2126

## 2021-12-31 NOTE — ED NOTES
Patient in for bed transfer to [facility]. Per respiratory patient needs to be placed on a non rebreather before being transferred. Patient placed on non rebreather and de sat to 78%. Patient placed back on high flow with non rebreather over high flow. Patient O2 sat back up to 95%. Patient talking and oriented. Respiratory and Dr. Mumtaz Ortega notified. Dr. Mumtaz Ortega stated to call hospitalist for ICU placement. Hospitalist perfect served.       Fransisca Aguilera RN  12/30/21 5361

## 2021-12-31 NOTE — ED NOTES
[facility] called. Per [facility] patient is a hold for possible eye donation.       Ubaldo Saez RN  12/30/21 6290
